# Patient Record
Sex: MALE | Race: WHITE | NOT HISPANIC OR LATINO | Employment: FULL TIME | URBAN - METROPOLITAN AREA
[De-identification: names, ages, dates, MRNs, and addresses within clinical notes are randomized per-mention and may not be internally consistent; named-entity substitution may affect disease eponyms.]

---

## 2018-03-14 ENCOUNTER — OFFICE VISIT CONVERTED (OUTPATIENT)
Dept: CARDIOLOGY | Facility: CLINIC | Age: 78
End: 2018-03-14
Attending: INTERNAL MEDICINE

## 2018-09-24 ENCOUNTER — OFFICE VISIT CONVERTED (OUTPATIENT)
Dept: CARDIOLOGY | Facility: CLINIC | Age: 78
End: 2018-09-24
Attending: NURSE PRACTITIONER

## 2018-09-24 ENCOUNTER — OFFICE VISIT CONVERTED (OUTPATIENT)
Dept: UROLOGY | Facility: CLINIC | Age: 78
End: 2018-09-24
Attending: UROLOGY

## 2019-03-27 ENCOUNTER — HOSPITAL ENCOUNTER (OUTPATIENT)
Dept: OTHER | Facility: HOSPITAL | Age: 79
Discharge: HOME | End: 2019-03-27
Attending: UROLOGY

## 2019-03-27 LAB — PSA SERPL-MCNC: 0.26 NG/ML (ref 0–4)

## 2019-04-01 ENCOUNTER — OFFICE VISIT CONVERTED (OUTPATIENT)
Dept: CARDIOLOGY | Facility: CLINIC | Age: 79
End: 2019-04-01
Attending: INTERNAL MEDICINE

## 2019-04-22 ENCOUNTER — HOSPITAL ENCOUNTER (OUTPATIENT)
Dept: GASTROENTEROLOGY | Facility: HOSPITAL | Age: 79
Setting detail: HOSPITAL OUTPATIENT SURGERY
Discharge: HOME | End: 2019-04-22
Attending: INTERNAL MEDICINE

## 2019-09-16 ENCOUNTER — HOSPITAL ENCOUNTER (OUTPATIENT)
Dept: LAB | Facility: HOSPITAL | Age: 79
Discharge: HOME | End: 2019-09-16
Attending: PHYSICIAN ASSISTANT

## 2019-09-16 LAB
ANION GAP SERPL CALC-SCNC: 18 MMOL/L (ref 8–19)
BUN SERPL-MCNC: 24 MG/DL (ref 5–25)
BUN/CREAT SERPL: 14 {RATIO} (ref 6–20)
CALCIUM SERPL-MCNC: 9.3 MG/DL (ref 8.7–10.4)
CHLORIDE SERPL-SCNC: 97 MMOL/L (ref 99–111)
CONV CO2: 26 MMOL/L (ref 22–32)
CREAT UR-MCNC: 1.73 MG/DL (ref 0.7–1.2)
GFR SERPLBLD BASED ON 1.73 SQ M-ARVRAT: 37 ML/MIN/{1.73_M2}
GLUCOSE SERPL-MCNC: 122 MG/DL (ref 70–99)
OSMOLALITY SERPL CALC.SUM OF ELEC: 289 MOSM/KG (ref 273–304)
POTASSIUM SERPL-SCNC: 4.3 MMOL/L (ref 3.5–5.3)
SODIUM SERPL-SCNC: 137 MMOL/L (ref 135–147)

## 2019-09-27 ENCOUNTER — OFFICE VISIT CONVERTED (OUTPATIENT)
Dept: UROLOGY | Facility: CLINIC | Age: 79
End: 2019-09-27
Attending: UROLOGY

## 2019-09-30 ENCOUNTER — HOSPITAL ENCOUNTER (OUTPATIENT)
Dept: LAB | Facility: HOSPITAL | Age: 79
Discharge: HOME | End: 2019-09-30
Attending: PHYSICIAN ASSISTANT

## 2019-09-30 LAB
ANION GAP SERPL CALC-SCNC: 21 MMOL/L (ref 8–19)
BUN SERPL-MCNC: 19 MG/DL (ref 5–25)
BUN/CREAT SERPL: 12 {RATIO} (ref 6–20)
CALCIUM SERPL-MCNC: 9.3 MG/DL (ref 8.7–10.4)
CHLORIDE SERPL-SCNC: 106 MMOL/L (ref 99–111)
CONV CO2: 21 MMOL/L (ref 22–32)
CREAT UR-MCNC: 1.56 MG/DL (ref 0.7–1.2)
GFR SERPLBLD BASED ON 1.73 SQ M-ARVRAT: 41 ML/MIN/{1.73_M2}
GLUCOSE SERPL-MCNC: 115 MG/DL (ref 70–99)
OSMOLALITY SERPL CALC.SUM OF ELEC: 299 MOSM/KG (ref 273–304)
POTASSIUM SERPL-SCNC: 4.7 MMOL/L (ref 3.5–5.3)
SODIUM SERPL-SCNC: 143 MMOL/L (ref 135–147)

## 2019-10-03 ENCOUNTER — HOSPITAL ENCOUNTER (OUTPATIENT)
Dept: LAB | Facility: HOSPITAL | Age: 79
Discharge: HOME | End: 2019-10-03
Attending: INTERNAL MEDICINE

## 2019-10-03 LAB
ALBUMIN SERPL-MCNC: 4.1 G/DL (ref 3.5–5)
ALBUMIN/GLOB SERPL: 1.8 {RATIO} (ref 1.4–2.6)
ALP SERPL-CCNC: 75 U/L (ref 56–155)
ALT SERPL-CCNC: 27 U/L (ref 10–40)
ANION GAP SERPL CALC-SCNC: 15 MMOL/L (ref 8–19)
AST SERPL-CCNC: 21 U/L (ref 15–50)
BASOPHILS # BLD AUTO: 0.04 10*3/UL (ref 0–0.2)
BASOPHILS NFR BLD AUTO: 1 % (ref 0–3)
BILIRUB SERPL-MCNC: 0.26 MG/DL (ref 0.2–1.3)
BUN SERPL-MCNC: 18 MG/DL (ref 5–25)
BUN/CREAT SERPL: 11 {RATIO} (ref 6–20)
CALCIUM SERPL-MCNC: 9.2 MG/DL (ref 8.7–10.4)
CHLORIDE SERPL-SCNC: 105 MMOL/L (ref 99–111)
CHOLEST SERPL-MCNC: 144 MG/DL (ref 107–200)
CHOLEST/HDLC SERPL: 4.5 {RATIO} (ref 3–6)
CONV ABS IMM GRAN: 0.01 10*3/UL (ref 0–0.2)
CONV CO2: 25 MMOL/L (ref 22–32)
CONV IMMATURE GRAN: 0.3 % (ref 0–1.8)
CONV TOTAL PROTEIN: 6.4 G/DL (ref 6.3–8.2)
CREAT UR-MCNC: 1.62 MG/DL (ref 0.7–1.2)
DEPRECATED RDW RBC AUTO: 48.3 FL (ref 35.1–43.9)
EOSINOPHIL # BLD AUTO: 0.25 10*3/UL (ref 0–0.7)
EOSINOPHIL # BLD AUTO: 6.4 % (ref 0–7)
ERYTHROCYTE [DISTWIDTH] IN BLOOD BY AUTOMATED COUNT: 13.7 % (ref 11.6–14.4)
GFR SERPLBLD BASED ON 1.73 SQ M-ARVRAT: 40 ML/MIN/{1.73_M2}
GLOBULIN UR ELPH-MCNC: 2.3 G/DL (ref 2–3.5)
GLUCOSE SERPL-MCNC: 96 MG/DL (ref 70–99)
HCT VFR BLD AUTO: 36.4 % (ref 42–52)
HDLC SERPL-MCNC: 32 MG/DL (ref 40–60)
HGB BLD-MCNC: 11.8 G/DL (ref 14–18)
LDLC SERPL CALC-MCNC: 59 MG/DL (ref 70–100)
LYMPHOCYTES # BLD AUTO: 1.28 10*3/UL (ref 1–5)
LYMPHOCYTES NFR BLD AUTO: 32.7 % (ref 20–45)
MCH RBC QN AUTO: 31.1 PG (ref 27–31)
MCHC RBC AUTO-ENTMCNC: 32.4 G/DL (ref 33–37)
MCV RBC AUTO: 95.8 FL (ref 80–96)
MONOCYTES # BLD AUTO: 0.41 10*3/UL (ref 0.2–1.2)
MONOCYTES NFR BLD AUTO: 10.5 % (ref 3–10)
NEUTROPHILS # BLD AUTO: 1.93 10*3/UL (ref 2–8)
NEUTROPHILS NFR BLD AUTO: 49.1 % (ref 30–85)
NRBC CBCN: 0 % (ref 0–0.7)
OSMOLALITY SERPL CALC.SUM OF ELEC: 294 MOSM/KG (ref 273–304)
PLATELET # BLD AUTO: 131 10*3/UL (ref 130–400)
PMV BLD AUTO: 10.6 FL (ref 9.4–12.4)
POTASSIUM SERPL-SCNC: 4.2 MMOL/L (ref 3.5–5.3)
RBC # BLD AUTO: 3.8 10*6/UL (ref 4.7–6.1)
SODIUM SERPL-SCNC: 141 MMOL/L (ref 135–147)
T4 FREE SERPL-MCNC: 1.2 NG/DL (ref 0.9–1.8)
TRIGL SERPL-MCNC: 264 MG/DL (ref 40–150)
TSH SERPL-ACNC: 3.67 M[IU]/L (ref 0.27–4.2)
VLDLC SERPL-MCNC: 53 MG/DL (ref 5–37)
WBC # BLD AUTO: 3.92 10*3/UL (ref 4.8–10.8)

## 2019-10-07 ENCOUNTER — OFFICE VISIT CONVERTED (OUTPATIENT)
Dept: CARDIOLOGY | Facility: CLINIC | Age: 79
End: 2019-10-07
Attending: INTERNAL MEDICINE

## 2019-10-16 ENCOUNTER — HOSPITAL ENCOUNTER (OUTPATIENT)
Dept: LAB | Facility: HOSPITAL | Age: 79
Discharge: HOME | End: 2019-10-16
Attending: PHYSICIAN ASSISTANT

## 2019-10-16 LAB
ANION GAP SERPL CALC-SCNC: 18 MMOL/L (ref 8–19)
BUN SERPL-MCNC: 27 MG/DL (ref 5–25)
BUN/CREAT SERPL: 15 {RATIO} (ref 6–20)
CALCIUM SERPL-MCNC: 9.4 MG/DL (ref 8.7–10.4)
CHLORIDE SERPL-SCNC: 96 MMOL/L (ref 99–111)
CONV CO2: 27 MMOL/L (ref 22–32)
CREAT UR-MCNC: 1.77 MG/DL (ref 0.7–1.2)
GFR SERPLBLD BASED ON 1.73 SQ M-ARVRAT: 36 ML/MIN/{1.73_M2}
GLUCOSE SERPL-MCNC: 106 MG/DL (ref 70–99)
OSMOLALITY SERPL CALC.SUM OF ELEC: 290 MOSM/KG (ref 273–304)
POTASSIUM SERPL-SCNC: 4.3 MMOL/L (ref 3.5–5.3)
SODIUM SERPL-SCNC: 137 MMOL/L (ref 135–147)

## 2019-10-21 ENCOUNTER — HOSPITAL ENCOUNTER (OUTPATIENT)
Dept: LAB | Facility: HOSPITAL | Age: 79
Discharge: HOME | End: 2019-10-21
Attending: INTERNAL MEDICINE

## 2019-10-21 LAB
BASOPHILS # BLD AUTO: 0.03 10*3/UL (ref 0–0.2)
BASOPHILS NFR BLD AUTO: 0.6 % (ref 0–3)
CONV ABS IMM GRAN: 0.02 10*3/UL (ref 0–0.2)
CONV IMMATURE GRAN: 0.4 % (ref 0–1.8)
DEPRECATED RDW RBC AUTO: 45.3 FL (ref 35.1–43.9)
EOSINOPHIL # BLD AUTO: 0.25 10*3/UL (ref 0–0.7)
EOSINOPHIL # BLD AUTO: 5.4 % (ref 0–7)
ERYTHROCYTE [DISTWIDTH] IN BLOOD BY AUTOMATED COUNT: 13.2 % (ref 11.6–14.4)
HCT VFR BLD AUTO: 38.4 % (ref 42–52)
HGB BLD-MCNC: 12.7 G/DL (ref 14–18)
LYMPHOCYTES # BLD AUTO: 1.49 10*3/UL (ref 1–5)
LYMPHOCYTES NFR BLD AUTO: 32.2 % (ref 20–45)
MCH RBC QN AUTO: 31.1 PG (ref 27–31)
MCHC RBC AUTO-ENTMCNC: 33.1 G/DL (ref 33–37)
MCV RBC AUTO: 93.9 FL (ref 80–96)
MONOCYTES # BLD AUTO: 0.44 10*3/UL (ref 0.2–1.2)
MONOCYTES NFR BLD AUTO: 9.5 % (ref 3–10)
NEUTROPHILS # BLD AUTO: 2.4 10*3/UL (ref 2–8)
NEUTROPHILS NFR BLD AUTO: 51.9 % (ref 30–85)
NRBC CBCN: 0 % (ref 0–0.7)
PLATELET # BLD AUTO: 128 10*3/UL (ref 130–400)
PMV BLD AUTO: 10.4 FL (ref 9.4–12.4)
RBC # BLD AUTO: 4.09 10*6/UL (ref 4.7–6.1)
WBC # BLD AUTO: 4.63 10*3/UL (ref 4.8–10.8)

## 2019-11-21 ENCOUNTER — HOSPITAL ENCOUNTER (OUTPATIENT)
Dept: LAB | Facility: HOSPITAL | Age: 79
Discharge: HOME | End: 2019-11-21
Attending: INTERNAL MEDICINE

## 2019-11-21 LAB
ANION GAP SERPL CALC-SCNC: 17 MMOL/L (ref 8–19)
BASOPHILS # BLD AUTO: 0.03 10*3/UL (ref 0–0.2)
BASOPHILS NFR BLD AUTO: 0.6 % (ref 0–3)
BUN SERPL-MCNC: 30 MG/DL (ref 5–25)
BUN/CREAT SERPL: 17 {RATIO} (ref 6–20)
CALCIUM SERPL-MCNC: 9.6 MG/DL (ref 8.7–10.4)
CHLORIDE SERPL-SCNC: 100 MMOL/L (ref 99–111)
CONV ABS IMM GRAN: 0.01 10*3/UL (ref 0–0.2)
CONV CO2: 28 MMOL/L (ref 22–32)
CONV IMMATURE GRAN: 0.2 % (ref 0–1.8)
CREAT UR-MCNC: 1.77 MG/DL (ref 0.7–1.2)
DEPRECATED RDW RBC AUTO: 45.8 FL (ref 35.1–43.9)
EOSINOPHIL # BLD AUTO: 0.24 10*3/UL (ref 0–0.7)
EOSINOPHIL # BLD AUTO: 4.7 % (ref 0–7)
ERYTHROCYTE [DISTWIDTH] IN BLOOD BY AUTOMATED COUNT: 13.4 % (ref 11.6–14.4)
GFR SERPLBLD BASED ON 1.73 SQ M-ARVRAT: 36 ML/MIN/{1.73_M2}
GLUCOSE SERPL-MCNC: 106 MG/DL (ref 70–99)
HCT VFR BLD AUTO: 39.9 % (ref 42–52)
HGB BLD-MCNC: 13.2 G/DL (ref 14–18)
LYMPHOCYTES # BLD AUTO: 1.38 10*3/UL (ref 1–5)
LYMPHOCYTES NFR BLD AUTO: 27 % (ref 20–45)
MCH RBC QN AUTO: 31.1 PG (ref 27–31)
MCHC RBC AUTO-ENTMCNC: 33.1 G/DL (ref 33–37)
MCV RBC AUTO: 93.9 FL (ref 80–96)
MONOCYTES # BLD AUTO: 0.5 10*3/UL (ref 0.2–1.2)
MONOCYTES NFR BLD AUTO: 9.8 % (ref 3–10)
NEUTROPHILS # BLD AUTO: 2.95 10*3/UL (ref 2–8)
NEUTROPHILS NFR BLD AUTO: 57.7 % (ref 30–85)
NRBC CBCN: 0 % (ref 0–0.7)
OSMOLALITY SERPL CALC.SUM OF ELEC: 297 MOSM/KG (ref 273–304)
PLATELET # BLD AUTO: 132 10*3/UL (ref 130–400)
PMV BLD AUTO: 11 FL (ref 9.4–12.4)
POTASSIUM SERPL-SCNC: 4.9 MMOL/L (ref 3.5–5.3)
RBC # BLD AUTO: 4.25 10*6/UL (ref 4.7–6.1)
SODIUM SERPL-SCNC: 140 MMOL/L (ref 135–147)
WBC # BLD AUTO: 5.11 10*3/UL (ref 4.8–10.8)

## 2020-01-03 ENCOUNTER — HOSPITAL ENCOUNTER (OUTPATIENT)
Dept: LAB | Facility: HOSPITAL | Age: 80
Discharge: HOME | End: 2020-01-03
Attending: NURSE PRACTITIONER

## 2020-01-03 LAB
ALBUMIN SERPL-MCNC: 4.3 G/DL (ref 3.5–5)
ALBUMIN/GLOB SERPL: 1.9 {RATIO} (ref 1.4–2.6)
ALP SERPL-CCNC: 75 U/L (ref 56–155)
ALT SERPL-CCNC: 23 U/L (ref 10–40)
ANION GAP SERPL CALC-SCNC: 17 MMOL/L (ref 8–19)
AST SERPL-CCNC: 19 U/L (ref 15–50)
BASOPHILS # BLD AUTO: 0.05 10*3/UL (ref 0–0.2)
BASOPHILS NFR BLD AUTO: 1 % (ref 0–3)
BILIRUB SERPL-MCNC: 0.39 MG/DL (ref 0.2–1.3)
BUN SERPL-MCNC: 36 MG/DL (ref 5–25)
BUN/CREAT SERPL: 20 {RATIO} (ref 6–20)
CALCIUM SERPL-MCNC: 9.5 MG/DL (ref 8.7–10.4)
CHLORIDE SERPL-SCNC: 100 MMOL/L (ref 99–111)
CHOLEST SERPL-MCNC: 183 MG/DL (ref 107–200)
CHOLEST/HDLC SERPL: 5.5 {RATIO} (ref 3–6)
CONV ABS IMM GRAN: 0.02 10*3/UL (ref 0–0.2)
CONV CO2: 25 MMOL/L (ref 22–32)
CONV IMMATURE GRAN: 0.4 % (ref 0–1.8)
CONV TOTAL PROTEIN: 6.6 G/DL (ref 6.3–8.2)
CREAT UR-MCNC: 1.81 MG/DL (ref 0.7–1.2)
DEPRECATED RDW RBC AUTO: 46.2 FL (ref 35.1–43.9)
EOSINOPHIL # BLD AUTO: 0.3 10*3/UL (ref 0–0.7)
EOSINOPHIL # BLD AUTO: 5.9 % (ref 0–7)
ERYTHROCYTE [DISTWIDTH] IN BLOOD BY AUTOMATED COUNT: 13.6 % (ref 11.6–14.4)
GFR SERPLBLD BASED ON 1.73 SQ M-ARVRAT: 35 ML/MIN/{1.73_M2}
GLOBULIN UR ELPH-MCNC: 2.3 G/DL (ref 2–3.5)
GLUCOSE SERPL-MCNC: 119 MG/DL (ref 70–99)
HCT VFR BLD AUTO: 41.9 % (ref 42–52)
HDLC SERPL-MCNC: 33 MG/DL (ref 40–60)
HGB BLD-MCNC: 13.7 G/DL (ref 14–18)
LDLC SERPL CALC-MCNC: 94 MG/DL (ref 70–100)
LYMPHOCYTES # BLD AUTO: 1.54 10*3/UL (ref 1–5)
LYMPHOCYTES NFR BLD AUTO: 30.1 % (ref 20–45)
MCH RBC QN AUTO: 30.5 PG (ref 27–31)
MCHC RBC AUTO-ENTMCNC: 32.7 G/DL (ref 33–37)
MCV RBC AUTO: 93.3 FL (ref 80–96)
MONOCYTES # BLD AUTO: 0.42 10*3/UL (ref 0.2–1.2)
MONOCYTES NFR BLD AUTO: 8.2 % (ref 3–10)
NEUTROPHILS # BLD AUTO: 2.79 10*3/UL (ref 2–8)
NEUTROPHILS NFR BLD AUTO: 54.4 % (ref 30–85)
NRBC CBCN: 0 % (ref 0–0.7)
OSMOLALITY SERPL CALC.SUM OF ELEC: 293 MOSM/KG (ref 273–304)
PLATELET # BLD AUTO: 123 10*3/UL (ref 130–400)
PMV BLD AUTO: 11.4 FL (ref 9.4–12.4)
POTASSIUM SERPL-SCNC: 4.7 MMOL/L (ref 3.5–5.3)
RBC # BLD AUTO: 4.49 10*6/UL (ref 4.7–6.1)
SODIUM SERPL-SCNC: 137 MMOL/L (ref 135–147)
TRIGL SERPL-MCNC: 281 MG/DL (ref 40–150)
VLDLC SERPL-MCNC: 56 MG/DL (ref 5–37)
WBC # BLD AUTO: 5.12 10*3/UL (ref 4.8–10.8)

## 2020-01-06 ENCOUNTER — OFFICE VISIT CONVERTED (OUTPATIENT)
Dept: GASTROENTEROLOGY | Facility: CLINIC | Age: 80
End: 2020-01-06
Attending: PHYSICIAN ASSISTANT

## 2020-01-06 ENCOUNTER — CONVERSION ENCOUNTER (OUTPATIENT)
Dept: GASTROENTEROLOGY | Facility: CLINIC | Age: 80
End: 2020-01-06

## 2020-01-13 ENCOUNTER — OFFICE VISIT CONVERTED (OUTPATIENT)
Dept: CARDIOLOGY | Facility: CLINIC | Age: 80
End: 2020-01-13
Attending: INTERNAL MEDICINE

## 2020-04-06 ENCOUNTER — TELEMEDICINE CONVERTED (OUTPATIENT)
Dept: GASTROENTEROLOGY | Facility: CLINIC | Age: 80
End: 2020-04-06
Attending: PHYSICIAN ASSISTANT

## 2020-05-04 ENCOUNTER — HOSPITAL ENCOUNTER (OUTPATIENT)
Dept: LAB | Facility: HOSPITAL | Age: 80
Discharge: HOME | End: 2020-05-04
Attending: NURSE PRACTITIONER

## 2020-05-04 LAB
ALBUMIN SERPL-MCNC: 4.1 G/DL (ref 3.5–5)
ALBUMIN/GLOB SERPL: 1.6 {RATIO} (ref 1.4–2.6)
ALP SERPL-CCNC: 70 U/L (ref 56–155)
ALT SERPL-CCNC: 25 U/L (ref 10–40)
ANION GAP SERPL CALC-SCNC: 17 MMOL/L (ref 8–19)
AST SERPL-CCNC: 19 U/L (ref 15–50)
BILIRUB SERPL-MCNC: 0.4 MG/DL (ref 0.2–1.3)
BUN SERPL-MCNC: 21 MG/DL (ref 5–25)
BUN/CREAT SERPL: 12 {RATIO} (ref 6–20)
CALCIUM SERPL-MCNC: 9.3 MG/DL (ref 8.7–10.4)
CHLORIDE SERPL-SCNC: 101 MMOL/L (ref 99–111)
CHOLEST SERPL-MCNC: 115 MG/DL (ref 107–200)
CHOLEST/HDLC SERPL: 3.7 {RATIO} (ref 3–6)
CONV CO2: 26 MMOL/L (ref 22–32)
CONV TOTAL PROTEIN: 6.6 G/DL (ref 6.3–8.2)
CREAT UR-MCNC: 1.79 MG/DL (ref 0.7–1.2)
GFR SERPLBLD BASED ON 1.73 SQ M-ARVRAT: 35 ML/MIN/{1.73_M2}
GLOBULIN UR ELPH-MCNC: 2.5 G/DL (ref 2–3.5)
GLUCOSE SERPL-MCNC: 114 MG/DL (ref 70–99)
HDLC SERPL-MCNC: 31 MG/DL (ref 40–60)
LDLC SERPL CALC-MCNC: 57 MG/DL (ref 70–100)
OSMOLALITY SERPL CALC.SUM OF ELEC: 292 MOSM/KG (ref 273–304)
POTASSIUM SERPL-SCNC: 5.1 MMOL/L (ref 3.5–5.3)
SODIUM SERPL-SCNC: 139 MMOL/L (ref 135–147)
TRIGL SERPL-MCNC: 134 MG/DL (ref 40–150)
VLDLC SERPL-MCNC: 27 MG/DL (ref 5–37)

## 2020-07-13 ENCOUNTER — OFFICE VISIT CONVERTED (OUTPATIENT)
Dept: CARDIOLOGY | Facility: CLINIC | Age: 80
End: 2020-07-13
Attending: INTERNAL MEDICINE

## 2020-08-31 ENCOUNTER — HOSPITAL ENCOUNTER (OUTPATIENT)
Dept: LAB | Facility: HOSPITAL | Age: 80
Discharge: HOME | End: 2020-08-31
Attending: NURSE PRACTITIONER

## 2020-08-31 LAB
ANION GAP SERPL CALC-SCNC: 20 MMOL/L (ref 8–19)
BUN SERPL-MCNC: 25 MG/DL (ref 5–25)
BUN/CREAT SERPL: 14 {RATIO} (ref 6–20)
CALCIUM SERPL-MCNC: 9.8 MG/DL (ref 8.7–10.4)
CHLORIDE SERPL-SCNC: 102 MMOL/L (ref 99–111)
CONV CO2: 24 MMOL/L (ref 22–32)
CREAT UR-MCNC: 1.74 MG/DL (ref 0.7–1.2)
GFR SERPLBLD BASED ON 1.73 SQ M-ARVRAT: 36 ML/MIN/{1.73_M2}
GLUCOSE SERPL-MCNC: 111 MG/DL (ref 70–99)
OSMOLALITY SERPL CALC.SUM OF ELEC: 297 MOSM/KG (ref 273–304)
POTASSIUM SERPL-SCNC: 4.9 MMOL/L (ref 3.5–5.3)
SODIUM SERPL-SCNC: 141 MMOL/L (ref 135–147)

## 2020-10-15 ENCOUNTER — HOSPITAL ENCOUNTER (OUTPATIENT)
Dept: LAB | Facility: HOSPITAL | Age: 80
Discharge: HOME | End: 2020-10-15
Attending: UROLOGY

## 2020-10-15 LAB — PSA SERPL-MCNC: 0.19 NG/ML (ref 0–4)

## 2020-10-23 ENCOUNTER — OFFICE VISIT CONVERTED (OUTPATIENT)
Dept: UROLOGY | Facility: CLINIC | Age: 80
End: 2020-10-23
Attending: UROLOGY

## 2021-01-25 ENCOUNTER — HOSPITAL ENCOUNTER (OUTPATIENT)
Dept: LAB | Facility: HOSPITAL | Age: 81
Discharge: HOME | End: 2021-01-25
Attending: NURSE PRACTITIONER

## 2021-01-25 LAB
25(OH)D3 SERPL-MCNC: 44.2 NG/ML (ref 30–100)
ALBUMIN SERPL-MCNC: 3.8 G/DL (ref 3.5–5)
ALBUMIN SERPL-MCNC: 3.9 G/DL (ref 3.5–5)
ALBUMIN/GLOB SERPL: 1.5 {RATIO} (ref 1.4–2.6)
ALP SERPL-CCNC: 91 U/L (ref 56–155)
ALT SERPL-CCNC: 16 U/L (ref 10–40)
ANION GAP SERPL CALC-SCNC: 12 MMOL/L (ref 8–19)
ANION GAP SERPL CALC-SCNC: 14 MMOL/L (ref 8–19)
APPEARANCE UR: CLEAR
AST SERPL-CCNC: 18 U/L (ref 15–50)
BASOPHILS # BLD AUTO: 0.03 10*3/UL (ref 0–0.2)
BASOPHILS NFR BLD AUTO: 0.7 % (ref 0–3)
BILIRUB SERPL-MCNC: 0.35 MG/DL (ref 0.2–1.3)
BILIRUB UR QL: NEGATIVE
BUN SERPL-MCNC: 24 MG/DL (ref 5–25)
BUN SERPL-MCNC: 25 MG/DL (ref 5–25)
BUN/CREAT SERPL: 17 {RATIO} (ref 6–20)
BUN/CREAT SERPL: 18 {RATIO} (ref 6–20)
CALCIUM SERPL-MCNC: 9 MG/DL (ref 8.7–10.4)
CALCIUM SERPL-MCNC: 9.3 MG/DL (ref 8.7–10.4)
CHLORIDE SERPL-SCNC: 100 MMOL/L (ref 99–111)
CHLORIDE SERPL-SCNC: 101 MMOL/L (ref 99–111)
CHOLEST SERPL-MCNC: 144 MG/DL (ref 107–200)
CHOLEST/HDLC SERPL: 3.7 {RATIO} (ref 3–6)
COLOR UR: YELLOW
CONV ABS IMM GRAN: 0.02 10*3/UL (ref 0–0.2)
CONV CO2: 27 MMOL/L (ref 22–32)
CONV CO2: 28 MMOL/L (ref 22–32)
CONV COLLECTION SOURCE (UA): NORMAL
CONV CREATININE URINE, RANDOM: 116.8 MG/DL (ref 10–300)
CONV IMMATURE GRAN: 0.4 % (ref 0–1.8)
CONV PROTEIN TO CREATININE RATIO (RANDOM URINE): 0.17 {RATIO} (ref 0–0.1)
CONV TOTAL PROTEIN: 6.4 G/DL (ref 6.3–8.2)
CONV UROBILINOGEN IN URINE BY AUTOMATED TEST STRIP: 1 {EHRLICHU}/DL (ref 0.1–1)
CREAT UR-MCNC: 1.38 MG/DL (ref 0.7–1.2)
CREAT UR-MCNC: 1.39 MG/DL (ref 0.7–1.2)
DEPRECATED RDW RBC AUTO: 48.6 FL (ref 35.1–43.9)
EOSINOPHIL # BLD AUTO: 0.52 10*3/UL (ref 0–0.7)
EOSINOPHIL # BLD AUTO: 11.5 % (ref 0–7)
ERYTHROCYTE [DISTWIDTH] IN BLOOD BY AUTOMATED COUNT: 14.1 % (ref 11.6–14.4)
EST. AVERAGE GLUCOSE BLD GHB EST-MCNC: 105 MG/DL
GFR SERPLBLD BASED ON 1.73 SQ M-ARVRAT: 47 ML/MIN/{1.73_M2}
GFR SERPLBLD BASED ON 1.73 SQ M-ARVRAT: 48 ML/MIN/{1.73_M2}
GLOBULIN UR ELPH-MCNC: 2.6 G/DL (ref 2–3.5)
GLUCOSE SERPL-MCNC: 119 MG/DL (ref 70–99)
GLUCOSE SERPL-MCNC: 120 MG/DL (ref 70–99)
GLUCOSE UR QL: NEGATIVE MG/DL
HBA1C MFR BLD: 5.3 % (ref 3.5–5.7)
HCT VFR BLD AUTO: 38.4 % (ref 42–52)
HDLC SERPL-MCNC: 39 MG/DL (ref 40–60)
HGB BLD-MCNC: 12.6 G/DL (ref 14–18)
HGB UR QL STRIP: NEGATIVE
KETONES UR QL STRIP: NEGATIVE MG/DL
LDLC SERPL CALC-MCNC: 60 MG/DL (ref 70–100)
LEUKOCYTE ESTERASE UR QL STRIP: NEGATIVE
LYMPHOCYTES # BLD AUTO: 1.49 10*3/UL (ref 1–5)
LYMPHOCYTES NFR BLD AUTO: 33 % (ref 20–45)
MCH RBC QN AUTO: 30.9 PG (ref 27–31)
MCHC RBC AUTO-ENTMCNC: 32.8 G/DL (ref 33–37)
MCV RBC AUTO: 94.1 FL (ref 80–96)
MONOCYTES # BLD AUTO: 0.46 10*3/UL (ref 0.2–1.2)
MONOCYTES NFR BLD AUTO: 10.2 % (ref 3–10)
NEUTROPHILS # BLD AUTO: 2 10*3/UL (ref 2–8)
NEUTROPHILS NFR BLD AUTO: 44.2 % (ref 30–85)
NITRITE UR QL STRIP: NEGATIVE
NRBC CBCN: 0 % (ref 0–0.7)
OSMOLALITY SERPL CALC.SUM OF ELEC: 290 MOSM/KG (ref 273–304)
PH UR STRIP.AUTO: 7 [PH] (ref 5–8)
PHOSPHATE SERPL-MCNC: 3 MG/DL (ref 2.4–4.5)
PLATELET # BLD AUTO: 132 10*3/UL (ref 130–400)
PMV BLD AUTO: 11 FL (ref 9.4–12.4)
POTASSIUM SERPL-SCNC: 4.3 MMOL/L (ref 3.5–5.3)
POTASSIUM SERPL-SCNC: 4.3 MMOL/L (ref 3.5–5.3)
PROT UR QL: NORMAL MG/DL
PROT UR-MCNC: 20.3 MG/DL
PTH-INTACT SERPL-MCNC: 56 PG/ML (ref 11.1–79.5)
RBC # BLD AUTO: 4.08 10*6/UL (ref 4.7–6.1)
SODIUM SERPL-SCNC: 137 MMOL/L (ref 135–147)
SODIUM SERPL-SCNC: 137 MMOL/L (ref 135–147)
SP GR UR: 1.02 (ref 1–1.03)
T4 FREE SERPL-MCNC: 1.2 NG/DL (ref 0.9–1.8)
TRIGL SERPL-MCNC: 225 MG/DL (ref 40–150)
TSH SERPL-ACNC: 2.98 M[IU]/L (ref 0.27–4.2)
VLDLC SERPL-MCNC: 45 MG/DL (ref 5–37)
WBC # BLD AUTO: 4.52 10*3/UL (ref 4.8–10.8)

## 2021-01-26 ENCOUNTER — HOSPITAL ENCOUNTER (OUTPATIENT)
Dept: OTHER | Facility: HOSPITAL | Age: 81
Discharge: HOME | End: 2021-01-26
Attending: INTERNAL MEDICINE

## 2021-02-01 ENCOUNTER — CONVERSION ENCOUNTER (OUTPATIENT)
Dept: CARDIOLOGY | Facility: CLINIC | Age: 81
End: 2021-02-01

## 2021-02-01 ENCOUNTER — OFFICE VISIT CONVERTED (OUTPATIENT)
Dept: CARDIOLOGY | Facility: CLINIC | Age: 81
End: 2021-02-01
Attending: NURSE PRACTITIONER

## 2021-02-18 ENCOUNTER — HOSPITAL ENCOUNTER (OUTPATIENT)
Dept: VACCINE CLINIC | Facility: HOSPITAL | Age: 81
Discharge: HOME | End: 2021-02-18
Attending: INTERNAL MEDICINE

## 2021-03-01 ENCOUNTER — HOSPITAL ENCOUNTER (OUTPATIENT)
Dept: LAB | Facility: HOSPITAL | Age: 81
Discharge: HOME | End: 2021-03-01
Attending: PHYSICIAN ASSISTANT

## 2021-05-12 NOTE — PROGRESS NOTES
Quick Note      Patient Name: Michael Sol   Patient ID: [id number]   Sex: Male   YOB: 1940    Primary Care Provider: Joana Dietz PA-C   Referring Provider: Joana Dietz PA-C    Visit Date: April 6, 2020    Provider: Pedrito Aguillon PA-C   Location: [facility]   Location Address: [address] [place] [address]  [id number]   Location Phone: [phone]          History Of Present Illness  TELEHEALTH VISIT  Chief Complaint: 3 month follow up IBS and ARTEMIO   Michael Sol is a 79 year old /White male who is presenting for evaluation via telehealth visit. Verbal consent obtained before beginning visit.   Provider spent 12 minutes with the patient during telehealth visit.   The following staff were present during this visit: Ever Worthington MA   Past Medical History/Overview of Patient Symptoms     79-year-old male with history of colon polyps, prostate cancer, and diverticulitis returns for follow-up.  He did have a bout of diverticulitis September 2019 successfully treated with Cipro and Flagyl.  He started developing lower abdominal cramping that occurred intermittently shortly after diverticulitis.  He was given a trial of Bentyl 20 mg to take as needed.  He reports he takes it as needed approximately once every 2 to 3 weeks with 100% improvement of symptoms.  Overall, the patient does not have any new complaints today. His most recent lab work showed hemoglobin 13.7 and MCV 93.3.  This is an improvement from his previous hemoglobin of 12.3.    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           Assessment  · Anemia     285.9/D64.9  · IBS (irritable bowel syndrome)     564.1/K58.9  · Personal history of colonic polyps     V12.72/Z86.010      Plan  · Orders  o CBC with Auto Diff [facility] (20240) - 285.9/D64.9, 564.1/K58.9, V12.72/Z86.010 - 04/06/2020  o Physician Telephone Evaluation, 11-20 minutes (50427) - 285.9/D64.9, 564.1/K58.9, V12.72/Z86.010 -  06/06/2020  · Medications  o Medications have been Reconciled  o Transition of Care or Provider Policy  · Instructions  o Plan Of Care:   o Chronic conditions reviewed and taken into consideration for today's treatment plan.  o Patient instructed to seek medical attention urgently for new or worsening symptoms.  o Patient was educated/instructed on their diagnosis, treatment and medications prior to discharge from the clinic today.  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 I had a thorough discussion with him regarding his anemia and that is improving. We discussed options to include EGD versus continue monitoring lab work. I will have him have a CBC in approximately 2 months and he will call for results. If his lab work is returned to normal he will follow-up as needed.            Electronically Signed by: NESSA Villafana-BRIDGET -Author on April 6, 2020 08:56:54 AM  Electronically Co-signed by: Derrick Garcia MD -Reviewer on April 6, 2020 11:07:05 AM

## 2021-05-13 NOTE — PROGRESS NOTES
Progress Note      Patient Name: Michael Sol   Patient ID: [id number]   Sex: Male   YOB: 1940    Primary Care Provider: Joana Dietz PA-C   Referring Provider: Joana Dietz PA-C    Visit Date: October 23, 2020    Provider: Juan Antonio Mark MD   Location: [facility]   Location Address: [address] [place] [address]  [id number]   Location Phone: [phone]          Chief Complaint  · pt here for urologic issues      History Of Present Illness     80-year-old  gentleman here today with a history of prostate cancer status post brachytherapy in 11/13.  Also with incontinence and nocturia.  Patient does have a red area on the glans penis that has been there for a long time.    Has noticed some slower stream in the a.m. over the last 3 to 4 months.  It is better during rest of the day.  No straining. Not bothersome.     No burning/pain dysuria or gross hematuria    Pt with incontience.  4-5 pads daily. Worse over the last year.  Nocturia X  3-4.  No urgency. Some urgency at night.    Area on the glans is not changing.  Is caused some itching at times.  Not painful.      Myrbetriq 50 mgdid not make any difference.    PVR     9/19  64    Previous    No history of kidney stone.    Status post coronary stent 2007.  Patient does not smoke.  81 aspirin daily    History of gross hematuria    6/15 cystoscopynegative  6/15 CT urogrampunctate nonobstructive tract was on the left.  Negative otherwise.     Prostate CA    10/20 0.19  3/19  0.26  1/17  0.52  4/16  0.9  9/15  0.71  8/14  1.47    12/13  brachytherapy  11/13 prostate biopsy  Right3+3, 2/6, 5%  Left3+3, 1/6, 5%  2/13  4.3         Past Medical History  Blepharitis; Colon polyps; Diverticulosis; GERD; H/O prostate cancer; Hematuria (Gross); High cholesterol; Hypertension; Personal Hx Prostate Cancer; Prostate Neoplasm, Malignant; Prostatitis (Acute); Urinary retention         Past Surgical History  cardiac  stents; Cataract surgery; Colonoscopy; Hemorrhoidectomy; Prostate Biopsy; Prostate Seed Implant         Medication List  amitriptyline 75 mg oral tablet; Aspir-81 81 mg oral tablet,delayed release (DR/EC); atorvastatin 20 mg oral tablet; cholecalciferol (vitamin D3) 400 unit oral capsule; dicyclomine 20 mg oral tablet; irbesartan 300 mg oral tablet; Lasix 20 mg oral tablet; levothyroxine 100 mcg oral tablet; losartan 100 mg oral tablet; nitroglycerin 0.4 mg sublingual tablet, sublingual; nystatin-triamcinolone 100,000-0.1 unit/g-% topical cream; sotalol 80 mg oral tablet; Vitamin B-12 oral         Allergy List  Septra         Family Medical History  - No Family History of Colorectal Cancer; -None         Social History  Alcohol (Current every day); Tobacco (Former)         Review of Systems  · Constitutional  o Denies  o : chills  · Gastrointestinal  o Denies  o : nausea      Vitals  Date Time BP Position Site L\R Cuff Size HR RR TEMP (F) WT  HT  BMI kg/m2 BSA m2 O2 Sat FR L/min FiO2        10/23/2020 08:24 AM       16  246lbs 0oz 6'   33.36 2.38             Physical Examination  · Constitutional  o Appearance  o : Well-appearing, well-developed, in no acute distress  · Respiratory  o Respiratory Effort  o : Unlabored breathing  · Cardiovascular  o Heart  o :   § Auscultation of Heart  § : Regular rate and rhythm, no murmurs  · Gastrointestinal  o Abdominal Examination  o : Nontender, nondistended, no rigidity or guarding, no hepatosplenomegaly  · Neurologic  o Mental Status Examination  o :   § Orientation  § : Grossly oriented to person, place and time, judgment and insight intact, normal mood and affect       With 2 red areas on the glans penis is not mass just some erythema.           Results  · In-Office Procedures  o Lab procedure  § Automated Dipstick Urinalysis (Surg Spec) WITHOUT Micro [facility] (30084)   § Automated Dipstick Urinalysis (Surg Spec) WITHOUT Micro [facility] (08294)       Assessment  · History of  prostate cancer     V10.46/Z85.46  · Nocturia     788.43/R35.1  · Urge incontinence of urine     788.31/N39.41      Plan  · Orders  o PSA Ultrasensitive, ANNUAL SCREENING [facility] (69249, ) - V10.46/Z85.46 - 10/23/2021  · Medications  o Medications have been Reconciled  o Transition of Care or Provider Policy  · Instructions  o Electronically Identified Patient Education Materials Provided Electronically       Glans erythema/balanitis    Patient with some discoloration of the glans penis this has not changed many years but does cause some itching.  We discussed to make sure he monitors this for changes.  He will start placing some triamcinolone/nystatin cream on it to see if this can lessen the itching.    He will let me know if this changes size of or call or come in sooner.    Urination not bothersome, after discussion of starting on Flomax at this time we will not start any medication  unless things get worse    Follow-up in 1 year with a PSA before    Greater than 15 minutes was used in counseling and coordination of care, with greater than 51% of this in face-to-face counseling             Electronically Signed by: Juan Antonio Mark MD -Author on October 23, 2020 10:38:53 AM

## 2021-05-13 NOTE — PROGRESS NOTES
Progress Note      Patient Name: Michael Sol   Patient ID: [id number]   Sex: Male   YOB: 1940    Primary Care Provider: Joana Dietz PA-C   Referring Provider: Joana Dietz PA-C    Visit Date: July 13, 2020    Provider: Jere Brar MD   Location: [facility]   Location Address: [address] [place] [address], [address]   [place]  [id number]   Location Phone: [phone]          Chief Complaint  · Pedal edema       History Of Present Illness  REFERRING PROVIDER: Joana Dietz PA-C   Michael Sol is an 80-year-old white male who continues to have pedal edema. It is mild and unchanged for him. He denies any chest pain or pressure. No palpitations, shortness of breath, dizziness, syncope, PND, or orthopnea. He exercises at least an hour a day, and has lost about four pounds since his last visit.   PAST MEDICAL HISTORY: Coronary artery disease, with previous MI and stent; hypertension; hyperlipidemia; paroxysmal atrial fibrillation; chronic kidney disease.   FAMILY HISTORY: Negative for diabetes, hypertension, and heart disease.   PSYCHOSOCIAL HISTORY: No history of mood change or depression. He drinks a moderate amount of alcohol. He previously smoked but quit.   CURRENT MEDICATIONS: include aspirin 81 mg daily; fish oil 1000 mg daily; Lasix 20 mg daily; vitamin B once month; Sotalol 80 mg 1/2 tablet b.i.d.; Atorvastatin 20 mg two tablets daily; Amitriptyline 75 mg daily; Cholecalciferol 1000 mg daily; Losartan 100 mg daily; Levothyroxine 50 mcg daily.The dosage and frequency of the medications were reviewed with the patient.       Review of Systems  · Cardiovascular  o Admits  o : swelling (feet, ankles, hands)  o Denies  o : palpitations (fast, fluttering, or skipping beats), shortness of breath while walking or lying flat, chest pain or angina pectoris   · Respiratory  o Denies  o : chronic or frequent cough, asthma or wheezing      Vitals  Date Time BP Position  Site L\R Cuff Size HR RR TEMP (F) WT  HT  BMI kg/m2 BSA m2 O2 Sat HC       07/13/2020 08:54 /92 Sitting    70 - R   236lbs 0oz 6'   32.01 2.33     07/13/2020 08:54 /82 Sitting    70 - R                 Physical Examination  · Constitutional  o Appearance  o : Awake, alert, in no acute distress, somewhat hard of hearing, obese male.  · Eyes  o Conjunctivae  o : Conjunctivae normal.  · Ears, Nose, Mouth and Throat  o Oral Cavity  o :   § Oral Mucosa  § : Normal.  · Neck  o Jugular Veins  o : No JVD. Good carotid upstroke. No bruits noted.  · Respiratory  o Respiratory  o : Good respiratory effort. Clear to percussion and auscultation.  · Cardiovascular  o Heart  o : PMI is not well felt. S1, S2 normal. No S3. No S4.   o Peripheral Vascular System  o :   § Extremities  § : Good femoral and pedal pulses. Trace pedal edema.  · Gastrointestinal  o Abdominal Examination  o : Soft. No tenderness or masses felt. No hepatosplenomegaly. Abdominal aorta is not palpable.  · EKG  o Indications  o : History of atrial arrhythmias.  o Results  o : In sinus rhythm at a rate of 66 with LAFB.  o Comparison  o : Unchanged compared to EKG of January 2020.  · Labs  o Labs  o : Taken in May: Total cholesterol 115, triglyceride 134, HDL 31,LDL 57 which is good for him. BUN 21, creatinine 1.79.          Assessment     1.  Hypertension, needs tighter control.   2.  Pedal edema, stable.   3.  Paroxysmal atrial fibrillation, currently in sinus.   4.  Hyperlipidemia, mixed, with LDL at goal.   5.  Coronary artery disease without angina.   6.  Chronic kidney disease, stable 3, stable.   7.  Anemia and thrombocytopenia.       Plan     1.  Stop Losartan.   2.  Start Irbesartan 300 mg daily.   3.  Do a blood pressure log and we will adjust hypertensive meds further if needed.   4.  Check a BMP in three weeks and follow up in six months with labs and EKG, or earlier if needed.      BILL Amin/Jere Brar MD,  [facility]  NATIVIDAD/PMM/pap    This note was transcribed by Inés Gorman. NATIVIDAD/IAN/daren  The above service was transcribed by Inés Gorman on my behalf and I attest to the accuracy of the note.  NATIVIDAD/PM             Electronically Signed by: Yenny Gorman-, Other -Author on July 15, 2020 03:39:52 PM  Electronically Co-signed by: BILL Reina -Reviewer on July 19, 2020 10:54:45 AM  Electronically Co-signed by: Jere Brar MD -Reviewer on July 20, 2020 07:43:05 AM

## 2021-05-14 VITALS — WEIGHT: 246 LBS | RESPIRATION RATE: 16 BRPM | BODY MASS INDEX: 33.32 KG/M2 | HEIGHT: 72 IN

## 2021-05-14 VITALS
DIASTOLIC BLOOD PRESSURE: 90 MMHG | HEART RATE: 68 BPM | WEIGHT: 236 LBS | BODY MASS INDEX: 31.97 KG/M2 | SYSTOLIC BLOOD PRESSURE: 148 MMHG | HEIGHT: 72 IN

## 2021-05-14 NOTE — PROGRESS NOTES
Progress Note      Patient Name: Michael Sol   Patient ID: [id number]   Sex: Male   YOB: 1940    Primary Care Provider: Joana Dietz PA-C   Referring Provider: Joana Dietz PA-C    Visit Date: February 1, 2021    Provider: BILL Reina   Location: [facility]   Location Address: [address] [place] [address], [address]   [place]  [id number]   Location Phone: [phone]          Chief Complaint     Pedal edema.       History Of Present Illness  REFERRING PROVIDER: Joana Dietz PA-C   Michael Sol is a 80 year old /White male who comes in complaining of pedal edema, with left greater than right, that is long term and normal for him. He denies any chest pain, palpitations, shortness of breath, dizziness, syncope, PND, or orthopnea. His weight is the same. He is a little bit more active but he admits he is eating a lot more sweets lately.   PAST MEDICAL HISTORY: Coronary artery disease, with previous MI and stent; hypertension; hyperlipidemia; paroxysmal atrial fibrillation; chronic kidney disease.   PSYCHOSOCIAL HISTORY: Previous tobacco use but quit. Rare alcohol use.   CURRENT MEDICATIONS: Cyanocobalamin 1000 mcg once a month; amitriptyline 75 mg 1/2 tab daily; ranitidine 150 mg b.i.d.; levothyroxine 50 mcg daily; sotalol 80 mg 1/2 tab b.i.d.; atorvastatin 20 mg daily; cholecalciferol 4000 units once a day; aspirin 81 mg daily; irbesartan 300 mg daily; Lasix 20 mg daily; fish oil 1000 mg daily. The dosage and frequency of the medications were reviewed with the patient.      ALLERGIES:  Septra.       Review of Systems  · Cardiovascular  o Admits  o : swelling (feet, ankles, hands)  o Denies  o : palpitations (fast, fluttering, or skipping beats), shortness of breath while walking or lying flat, chest pain or angina pectoris   · Respiratory  o Denies  o : chronic or frequent cough, asthma or wheezing      Vitals  Date Time BP Position Site L\R Cuff Size HR RR  TEMP (F) WT  HT  BMI kg/m2 BSA m2 O2 Sat FR L/min FiO2 HC       02/01/2021 08:08 /90 Sitting    68 - R   236lbs 0oz 6'   32.01 2.33       02/01/2021 08:08 /86 Sitting    64 - R                   Physical Examination  · Constitutional  o Appearance  o : Awake, alert, in no acute distress, somewhat hard of hearing.   · Eyes  o Conjunctivae  o : Conjunctivae normal.  · Ears, Nose, Mouth and Throat  o Oral Cavity  o :   § Oral Mucosa  § : Normal.  · Neck  o Jugular Veins  o : No JVD. Good carotid upstroke. No bruits noted.  · Respiratory  o Respiratory  o : Good respiratory effort. Clear to percussion and auscultation.  · Cardiovascular  o Heart  o : PMI is normal. S1, S2 normal. No S3. No S4. Negative systolic/diastolic murmur.  o Peripheral Vascular System  o :   § Extremities  § : Good femoral and pedal pulses. Trace pedal edema, left slightly greater than the right.  · Gastrointestinal  o Abdominal Examination  o : Soft. No tenderness or masses felt. No hepatosplenomegaly. Abdominal aorta is not palpable.  · Labs  o Labs  o : Blood sugar 120, BUN 25, creatinine 1.39. Total cholesterol 144, triglyceride up to 225, HDL low at 39, but that is actually an improvement for him, and LDL 60. Thyroid was normal.      EKG: Performed in the office today.   Indications: Monitoring meds and history of atrial fibrillation.  Results: Sinus rhythm with rate of 65 and left anterior fascicular block.   Comparison: Unchanged compared to EKG of July 2020.           Assessment     1.  Pedal edema, mild and stable.   2.  Hypertension, with degree of white coat syndrome with good readings at home.   3.  Paroxysmal atrial fibrillation, currently in sinus.   4.  Hyperlipidemia, mixed, with LDL at goal but elevated triglycerides.   5.  Coronary artery disease, without angina.   6.  Chronic kidney disease, stage 3, improving.       Plan     1.  Going to stop the regular fish oil.   2.  Start Vascepa 1 gram b.i.d. for one week and  then two tabs twice a day.   3.  Continue the levothyroxine for his hypothyroidism.  4.  Continue the sotalol in view of his history of atrial arrhythmia.   5.  Continue the atorvastatin for his hyperlipidemia.   6.  Continue the Lasix and irbesartan for his hypertension.   7.  Will check a lipid, CMP, and thyroid in three months.   8.  Follow up in 10 months with labs and EKG, or earlier if needed.     BILL Amin  JF/rt                     Electronically Signed by: Maya Rodgers-, Other -Author on February 14, 2021 04:08:12 PM  Electronically Co-signed by: BILL Reina -Reviewer on February 17, 2021 01:39:51 PM

## 2021-05-15 VITALS
SYSTOLIC BLOOD PRESSURE: 130 MMHG | DIASTOLIC BLOOD PRESSURE: 74 MMHG | HEIGHT: 72 IN | OXYGEN SATURATION: 99 % | WEIGHT: 239 LBS | RESPIRATION RATE: 16 BRPM | BODY MASS INDEX: 32.37 KG/M2 | HEART RATE: 62 BPM

## 2021-05-15 VITALS
HEART RATE: 62 BPM | WEIGHT: 244 LBS | DIASTOLIC BLOOD PRESSURE: 84 MMHG | BODY MASS INDEX: 33.05 KG/M2 | SYSTOLIC BLOOD PRESSURE: 130 MMHG | HEIGHT: 72 IN

## 2021-05-15 VITALS
BODY MASS INDEX: 31.97 KG/M2 | HEART RATE: 70 BPM | SYSTOLIC BLOOD PRESSURE: 162 MMHG | HEIGHT: 72 IN | WEIGHT: 236 LBS | DIASTOLIC BLOOD PRESSURE: 92 MMHG

## 2021-05-15 VITALS
DIASTOLIC BLOOD PRESSURE: 88 MMHG | WEIGHT: 242 LBS | HEART RATE: 58 BPM | HEIGHT: 72 IN | BODY MASS INDEX: 32.78 KG/M2 | SYSTOLIC BLOOD PRESSURE: 160 MMHG

## 2021-05-15 VITALS
SYSTOLIC BLOOD PRESSURE: 140 MMHG | HEART RATE: 60 BPM | HEIGHT: 72 IN | DIASTOLIC BLOOD PRESSURE: 78 MMHG | BODY MASS INDEX: 32.51 KG/M2 | WEIGHT: 240 LBS

## 2021-05-15 VITALS — HEIGHT: 72 IN | RESPIRATION RATE: 17 BRPM | WEIGHT: 246.37 LBS | BODY MASS INDEX: 33.37 KG/M2

## 2021-05-16 VITALS
HEIGHT: 72 IN | HEART RATE: 60 BPM | DIASTOLIC BLOOD PRESSURE: 72 MMHG | BODY MASS INDEX: 33.18 KG/M2 | WEIGHT: 245 LBS | SYSTOLIC BLOOD PRESSURE: 130 MMHG

## 2021-05-16 VITALS
SYSTOLIC BLOOD PRESSURE: 130 MMHG | BODY MASS INDEX: 33.05 KG/M2 | WEIGHT: 244 LBS | DIASTOLIC BLOOD PRESSURE: 82 MMHG | HEIGHT: 72 IN | HEART RATE: 66 BPM

## 2021-05-22 ENCOUNTER — TRANSCRIBE ORDERS (OUTPATIENT)
Dept: CARDIAC REHAB | Facility: HOSPITAL | Age: 81
End: 2021-05-22

## 2021-05-22 DIAGNOSIS — Z95.5 STATUS POST INSERTION OF DRUG ELUTING CORONARY ARTERY STENT: Primary | ICD-10-CM

## 2021-06-08 ENCOUNTER — APPOINTMENT (OUTPATIENT)
Dept: CARDIAC REHAB | Facility: HOSPITAL | Age: 81
End: 2021-06-08

## 2021-06-10 ENCOUNTER — APPOINTMENT (OUTPATIENT)
Dept: CARDIAC REHAB | Facility: HOSPITAL | Age: 81
End: 2021-06-10

## 2021-06-14 ENCOUNTER — OFFICE VISIT (OUTPATIENT)
Dept: CARDIAC REHAB | Facility: HOSPITAL | Age: 81
End: 2021-06-14

## 2021-06-14 DIAGNOSIS — Z95.5 HISTORY OF HEART ARTERY STENT: Primary | ICD-10-CM

## 2021-06-15 ENCOUNTER — APPOINTMENT (OUTPATIENT)
Dept: CARDIAC REHAB | Facility: HOSPITAL | Age: 81
End: 2021-06-15

## 2021-06-16 ENCOUNTER — OFFICE VISIT (OUTPATIENT)
Dept: CARDIAC REHAB | Facility: HOSPITAL | Age: 81
End: 2021-06-16

## 2021-06-16 DIAGNOSIS — Z95.5 HISTORY OF HEART ARTERY STENT: Primary | ICD-10-CM

## 2021-06-17 ENCOUNTER — APPOINTMENT (OUTPATIENT)
Dept: CARDIAC REHAB | Facility: HOSPITAL | Age: 81
End: 2021-06-17

## 2021-06-21 ENCOUNTER — OFFICE VISIT (OUTPATIENT)
Dept: CARDIAC REHAB | Facility: HOSPITAL | Age: 81
End: 2021-06-21

## 2021-06-21 DIAGNOSIS — Z95.5 HISTORY OF HEART ARTERY STENT: Primary | ICD-10-CM

## 2021-06-22 ENCOUNTER — APPOINTMENT (OUTPATIENT)
Dept: CARDIAC REHAB | Facility: HOSPITAL | Age: 81
End: 2021-06-22

## 2021-06-23 ENCOUNTER — OFFICE VISIT (OUTPATIENT)
Dept: CARDIAC REHAB | Facility: HOSPITAL | Age: 81
End: 2021-06-23

## 2021-06-23 ENCOUNTER — APPOINTMENT (OUTPATIENT)
Dept: CARDIOLOGY | Facility: HOSPITAL | Age: 81
End: 2021-06-23

## 2021-06-23 ENCOUNTER — LAB (OUTPATIENT)
Dept: LAB | Facility: HOSPITAL | Age: 81
End: 2021-06-23

## 2021-06-23 ENCOUNTER — TRANSCRIBE ORDERS (OUTPATIENT)
Dept: LAB | Facility: HOSPITAL | Age: 81
End: 2021-06-23

## 2021-06-23 ENCOUNTER — TRANSCRIBE ORDERS (OUTPATIENT)
Dept: ADMINISTRATIVE | Facility: HOSPITAL | Age: 81
End: 2021-06-23

## 2021-06-23 DIAGNOSIS — Z95.5 HISTORY OF HEART ARTERY STENT: Primary | ICD-10-CM

## 2021-06-23 DIAGNOSIS — I10 ESSENTIAL HYPERTENSION, MALIGNANT: ICD-10-CM

## 2021-06-23 DIAGNOSIS — N40.0 BENIGN PROSTATIC HYPERPLASIA, UNSPECIFIED WHETHER LOWER URINARY TRACT SYMPTOMS PRESENT: ICD-10-CM

## 2021-06-23 DIAGNOSIS — R06.02 SHORTNESS OF BREATH: ICD-10-CM

## 2021-06-23 DIAGNOSIS — Z12.5 SPECIAL SCREENING FOR MALIGNANT NEOPLASM OF PROSTATE: ICD-10-CM

## 2021-06-23 DIAGNOSIS — I83.893 VARICOSE VEINS OF LEG WITH SWELLING, BILATERAL: Primary | ICD-10-CM

## 2021-06-23 DIAGNOSIS — E78.5 HYPERLIPIDEMIA, UNSPECIFIED HYPERLIPIDEMIA TYPE: ICD-10-CM

## 2021-06-23 DIAGNOSIS — E03.9 HYPOTHYROIDISM, UNSPECIFIED TYPE: ICD-10-CM

## 2021-06-23 DIAGNOSIS — R73.01 IMPAIRED FASTING GLUCOSE: ICD-10-CM

## 2021-06-23 DIAGNOSIS — M79.89 LEFT LEG SWELLING: Primary | ICD-10-CM

## 2021-06-23 DIAGNOSIS — I10 ESSENTIAL HYPERTENSION, MALIGNANT: Primary | ICD-10-CM

## 2021-06-23 DIAGNOSIS — M79.89 RIGHT LEG SWELLING: ICD-10-CM

## 2021-06-23 LAB
ALBUMIN SERPL-MCNC: 4.2 G/DL (ref 3.5–5.2)
ALBUMIN/GLOB SERPL: 2.2 G/DL
ALP SERPL-CCNC: 73 U/L (ref 39–117)
ALT SERPL W P-5'-P-CCNC: 17 U/L (ref 1–41)
ANION GAP SERPL CALCULATED.3IONS-SCNC: 3.8 MMOL/L (ref 5–15)
AST SERPL-CCNC: 19 U/L (ref 1–40)
BASOPHILS # BLD AUTO: 0.02 10*3/MM3 (ref 0–0.2)
BASOPHILS NFR BLD AUTO: 0.4 % (ref 0–1.5)
BILIRUB SERPL-MCNC: 0.4 MG/DL (ref 0–1.2)
BUN SERPL-MCNC: 23 MG/DL (ref 8–23)
BUN/CREAT SERPL: 15.5 (ref 7–25)
CALCIUM SPEC-SCNC: 9.3 MG/DL (ref 8.6–10.5)
CHLORIDE SERPL-SCNC: 103 MMOL/L (ref 98–107)
CHOLEST SERPL-MCNC: 135 MG/DL (ref 0–200)
CO2 SERPL-SCNC: 31.2 MMOL/L (ref 22–29)
CREAT SERPL-MCNC: 1.48 MG/DL (ref 0.76–1.27)
DEPRECATED RDW RBC AUTO: 45.1 FL (ref 37–54)
EOSINOPHIL # BLD AUTO: 0.18 10*3/MM3 (ref 0–0.4)
EOSINOPHIL NFR BLD AUTO: 3.7 % (ref 0.3–6.2)
ERYTHROCYTE [DISTWIDTH] IN BLOOD BY AUTOMATED COUNT: 13.3 % (ref 12.3–15.4)
FOLATE SERPL-MCNC: 7.85 NG/ML (ref 4.78–24.2)
GFR SERPL CREATININE-BSD FRML MDRD: 46 ML/MIN/1.73
GLOBULIN UR ELPH-MCNC: 1.9 GM/DL
GLUCOSE SERPL-MCNC: 99 MG/DL (ref 65–99)
HBA1C MFR BLD: 5.46 % (ref 4.8–5.6)
HCT VFR BLD AUTO: 40.6 % (ref 37.5–51)
HDLC SERPL-MCNC: 34 MG/DL (ref 40–60)
HGB BLD-MCNC: 13.7 G/DL (ref 13–17.7)
IMM GRANULOCYTES # BLD AUTO: 0.01 10*3/MM3 (ref 0–0.05)
IMM GRANULOCYTES NFR BLD AUTO: 0.2 % (ref 0–0.5)
LDLC SERPL CALC-MCNC: 58 MG/DL (ref 0–100)
LDLC/HDLC SERPL: 1.35 {RATIO}
LYMPHOCYTES # BLD AUTO: 1.38 10*3/MM3 (ref 0.7–3.1)
LYMPHOCYTES NFR BLD AUTO: 28.5 % (ref 19.6–45.3)
MCH RBC QN AUTO: 31.2 PG (ref 26.6–33)
MCHC RBC AUTO-ENTMCNC: 33.7 G/DL (ref 31.5–35.7)
MCV RBC AUTO: 92.5 FL (ref 79–97)
MONOCYTES # BLD AUTO: 0.47 10*3/MM3 (ref 0.1–0.9)
MONOCYTES NFR BLD AUTO: 9.7 % (ref 5–12)
NEUTROPHILS NFR BLD AUTO: 2.78 10*3/MM3 (ref 1.7–7)
NEUTROPHILS NFR BLD AUTO: 57.5 % (ref 42.7–76)
NRBC BLD AUTO-RTO: 0 /100 WBC (ref 0–0.2)
NT-PROBNP SERPL-MCNC: 17.7 PG/ML (ref 0–1800)
PLATELET # BLD AUTO: 139 10*3/MM3 (ref 140–450)
PMV BLD AUTO: 10.1 FL (ref 6–12)
POTASSIUM SERPL-SCNC: 4.7 MMOL/L (ref 3.5–5.2)
PROT SERPL-MCNC: 6.1 G/DL (ref 6–8.5)
PSA SERPL-MCNC: 0.25 NG/ML (ref 0–4)
RBC # BLD AUTO: 4.39 10*6/MM3 (ref 4.14–5.8)
SODIUM SERPL-SCNC: 138 MMOL/L (ref 136–145)
T4 FREE SERPL-MCNC: 1.2 NG/DL (ref 0.93–1.7)
TRIGL SERPL-MCNC: 275 MG/DL (ref 0–150)
TSH SERPL DL<=0.05 MIU/L-ACNC: 1.9 UIU/ML (ref 0.27–4.2)
VIT B12 BLD-MCNC: 405 PG/ML (ref 211–946)
VLDLC SERPL-MCNC: 43 MG/DL (ref 5–40)
WBC # BLD AUTO: 4.84 10*3/MM3 (ref 3.4–10.8)

## 2021-06-23 PROCEDURE — 80053 COMPREHEN METABOLIC PANEL: CPT

## 2021-06-23 PROCEDURE — 84443 ASSAY THYROID STIM HORMONE: CPT

## 2021-06-23 PROCEDURE — 36415 COLL VENOUS BLD VENIPUNCTURE: CPT

## 2021-06-23 PROCEDURE — 82746 ASSAY OF FOLIC ACID SERUM: CPT

## 2021-06-23 PROCEDURE — 84439 ASSAY OF FREE THYROXINE: CPT

## 2021-06-23 PROCEDURE — 85025 COMPLETE CBC W/AUTO DIFF WBC: CPT

## 2021-06-23 PROCEDURE — 82607 VITAMIN B-12: CPT

## 2021-06-23 PROCEDURE — 80061 LIPID PANEL: CPT

## 2021-06-23 PROCEDURE — 83880 ASSAY OF NATRIURETIC PEPTIDE: CPT

## 2021-06-23 PROCEDURE — G0103 PSA SCREENING: HCPCS

## 2021-06-23 PROCEDURE — 83036 HEMOGLOBIN GLYCOSYLATED A1C: CPT

## 2021-06-24 ENCOUNTER — HOSPITAL ENCOUNTER (OUTPATIENT)
Dept: CARDIOLOGY | Facility: HOSPITAL | Age: 81
Discharge: HOME | End: 2021-06-24
Admitting: INTERNAL MEDICINE

## 2021-06-24 ENCOUNTER — APPOINTMENT (OUTPATIENT)
Dept: CARDIAC REHAB | Facility: HOSPITAL | Age: 81
End: 2021-06-24

## 2021-06-24 DIAGNOSIS — M79.89 LEFT LEG SWELLING: ICD-10-CM

## 2021-06-24 DIAGNOSIS — M79.89 RIGHT LEG SWELLING: ICD-10-CM

## 2021-06-24 LAB

## 2021-06-24 PROCEDURE — 93970 EXTREMITY STUDY: CPT | Performed by: SURGERY

## 2021-06-24 PROCEDURE — 93970 EXTREMITY STUDY: CPT

## 2021-06-28 ENCOUNTER — OFFICE VISIT (OUTPATIENT)
Dept: CARDIAC REHAB | Facility: HOSPITAL | Age: 81
End: 2021-06-28

## 2021-06-28 DIAGNOSIS — Z95.5 HISTORY OF HEART ARTERY STENT: Primary | ICD-10-CM

## 2021-06-29 ENCOUNTER — APPOINTMENT (OUTPATIENT)
Dept: CARDIAC REHAB | Facility: HOSPITAL | Age: 81
End: 2021-06-29

## 2021-06-29 ENCOUNTER — OFFICE VISIT (OUTPATIENT)
Dept: CARDIAC REHAB | Facility: HOSPITAL | Age: 81
End: 2021-06-29

## 2021-06-29 DIAGNOSIS — Z95.5 HISTORY OF HEART ARTERY STENT: Primary | ICD-10-CM

## 2021-06-30 ENCOUNTER — APPOINTMENT (OUTPATIENT)
Dept: CARDIAC REHAB | Facility: HOSPITAL | Age: 81
End: 2021-06-30

## 2021-07-01 ENCOUNTER — APPOINTMENT (OUTPATIENT)
Dept: CARDIAC REHAB | Facility: HOSPITAL | Age: 81
End: 2021-07-01

## 2021-07-05 ENCOUNTER — APPOINTMENT (OUTPATIENT)
Dept: CARDIAC REHAB | Facility: HOSPITAL | Age: 81
End: 2021-07-05

## 2021-07-06 ENCOUNTER — APPOINTMENT (OUTPATIENT)
Dept: CARDIAC REHAB | Facility: HOSPITAL | Age: 81
End: 2021-07-06

## 2021-07-06 ENCOUNTER — OFFICE VISIT (OUTPATIENT)
Dept: CARDIAC REHAB | Facility: HOSPITAL | Age: 81
End: 2021-07-06

## 2021-07-06 DIAGNOSIS — Z95.5 HISTORY OF HEART ARTERY STENT: Primary | ICD-10-CM

## 2021-07-07 ENCOUNTER — OFFICE VISIT (OUTPATIENT)
Dept: CARDIAC REHAB | Facility: HOSPITAL | Age: 81
End: 2021-07-07

## 2021-07-07 DIAGNOSIS — Z95.5 HISTORY OF HEART ARTERY STENT: Primary | ICD-10-CM

## 2021-07-08 ENCOUNTER — APPOINTMENT (OUTPATIENT)
Dept: CARDIAC REHAB | Facility: HOSPITAL | Age: 81
End: 2021-07-08

## 2021-07-12 ENCOUNTER — APPOINTMENT (OUTPATIENT)
Dept: CARDIAC REHAB | Facility: HOSPITAL | Age: 81
End: 2021-07-12

## 2021-07-13 ENCOUNTER — APPOINTMENT (OUTPATIENT)
Dept: CARDIAC REHAB | Facility: HOSPITAL | Age: 81
End: 2021-07-13

## 2021-07-14 ENCOUNTER — APPOINTMENT (OUTPATIENT)
Dept: CARDIAC REHAB | Facility: HOSPITAL | Age: 81
End: 2021-07-14

## 2021-07-15 ENCOUNTER — APPOINTMENT (OUTPATIENT)
Dept: CARDIAC REHAB | Facility: HOSPITAL | Age: 81
End: 2021-07-15

## 2021-07-20 ENCOUNTER — APPOINTMENT (OUTPATIENT)
Dept: CARDIAC REHAB | Facility: HOSPITAL | Age: 81
End: 2021-07-20

## 2021-07-22 ENCOUNTER — APPOINTMENT (OUTPATIENT)
Dept: CARDIAC REHAB | Facility: HOSPITAL | Age: 81
End: 2021-07-22

## 2021-07-27 ENCOUNTER — APPOINTMENT (OUTPATIENT)
Dept: CARDIAC REHAB | Facility: HOSPITAL | Age: 81
End: 2021-07-27

## 2021-07-28 ENCOUNTER — APPOINTMENT (OUTPATIENT)
Dept: CARDIAC REHAB | Facility: HOSPITAL | Age: 81
End: 2021-07-28

## 2021-07-29 ENCOUNTER — APPOINTMENT (OUTPATIENT)
Dept: CARDIAC REHAB | Facility: HOSPITAL | Age: 81
End: 2021-07-29

## 2021-08-02 ENCOUNTER — APPOINTMENT (OUTPATIENT)
Dept: CARDIAC REHAB | Facility: HOSPITAL | Age: 81
End: 2021-08-02

## 2021-08-03 ENCOUNTER — OFFICE VISIT (OUTPATIENT)
Dept: CARDIAC REHAB | Facility: HOSPITAL | Age: 81
End: 2021-08-03

## 2021-08-03 ENCOUNTER — APPOINTMENT (OUTPATIENT)
Dept: CARDIAC REHAB | Facility: HOSPITAL | Age: 81
End: 2021-08-03

## 2021-08-03 DIAGNOSIS — Z95.5 HISTORY OF HEART ARTERY STENT: Primary | ICD-10-CM

## 2021-08-04 ENCOUNTER — APPOINTMENT (OUTPATIENT)
Dept: CARDIAC REHAB | Facility: HOSPITAL | Age: 81
End: 2021-08-04

## 2021-08-05 ENCOUNTER — OFFICE VISIT (OUTPATIENT)
Dept: CARDIAC REHAB | Facility: HOSPITAL | Age: 81
End: 2021-08-05

## 2021-08-05 ENCOUNTER — APPOINTMENT (OUTPATIENT)
Dept: CARDIAC REHAB | Facility: HOSPITAL | Age: 81
End: 2021-08-05

## 2021-08-05 DIAGNOSIS — Z95.5 HISTORY OF HEART ARTERY STENT: Primary | ICD-10-CM

## 2021-08-09 ENCOUNTER — APPOINTMENT (OUTPATIENT)
Dept: CARDIAC REHAB | Facility: HOSPITAL | Age: 81
End: 2021-08-09

## 2021-08-10 ENCOUNTER — OFFICE VISIT (OUTPATIENT)
Dept: CARDIAC REHAB | Facility: HOSPITAL | Age: 81
End: 2021-08-10

## 2021-08-10 ENCOUNTER — APPOINTMENT (OUTPATIENT)
Dept: CARDIAC REHAB | Facility: HOSPITAL | Age: 81
End: 2021-08-10

## 2021-08-10 DIAGNOSIS — Z95.5 HISTORY OF HEART ARTERY STENT: Primary | ICD-10-CM

## 2021-08-11 ENCOUNTER — APPOINTMENT (OUTPATIENT)
Dept: CARDIAC REHAB | Facility: HOSPITAL | Age: 81
End: 2021-08-11

## 2021-08-12 ENCOUNTER — APPOINTMENT (OUTPATIENT)
Dept: CARDIAC REHAB | Facility: HOSPITAL | Age: 81
End: 2021-08-12

## 2021-08-16 ENCOUNTER — APPOINTMENT (OUTPATIENT)
Dept: CARDIAC REHAB | Facility: HOSPITAL | Age: 81
End: 2021-08-16

## 2021-08-17 ENCOUNTER — APPOINTMENT (OUTPATIENT)
Dept: CARDIAC REHAB | Facility: HOSPITAL | Age: 81
End: 2021-08-17

## 2021-08-18 ENCOUNTER — APPOINTMENT (OUTPATIENT)
Dept: CARDIAC REHAB | Facility: HOSPITAL | Age: 81
End: 2021-08-18

## 2021-08-19 ENCOUNTER — APPOINTMENT (OUTPATIENT)
Dept: CARDIAC REHAB | Facility: HOSPITAL | Age: 81
End: 2021-08-19

## 2021-08-23 ENCOUNTER — APPOINTMENT (OUTPATIENT)
Dept: CARDIAC REHAB | Facility: HOSPITAL | Age: 81
End: 2021-08-23

## 2021-08-24 ENCOUNTER — APPOINTMENT (OUTPATIENT)
Dept: CARDIAC REHAB | Facility: HOSPITAL | Age: 81
End: 2021-08-24

## 2021-08-25 ENCOUNTER — APPOINTMENT (OUTPATIENT)
Dept: CARDIAC REHAB | Facility: HOSPITAL | Age: 81
End: 2021-08-25

## 2021-08-26 ENCOUNTER — APPOINTMENT (OUTPATIENT)
Dept: CARDIAC REHAB | Facility: HOSPITAL | Age: 81
End: 2021-08-26

## 2021-08-30 ENCOUNTER — APPOINTMENT (OUTPATIENT)
Dept: CARDIAC REHAB | Facility: HOSPITAL | Age: 81
End: 2021-08-30

## 2021-08-31 ENCOUNTER — APPOINTMENT (OUTPATIENT)
Dept: CARDIAC REHAB | Facility: HOSPITAL | Age: 81
End: 2021-08-31

## 2021-09-01 ENCOUNTER — APPOINTMENT (OUTPATIENT)
Dept: CARDIAC REHAB | Facility: HOSPITAL | Age: 81
End: 2021-09-01

## 2021-09-02 ENCOUNTER — APPOINTMENT (OUTPATIENT)
Dept: CARDIAC REHAB | Facility: HOSPITAL | Age: 81
End: 2021-09-02

## 2021-09-06 ENCOUNTER — APPOINTMENT (OUTPATIENT)
Dept: CARDIAC REHAB | Facility: HOSPITAL | Age: 81
End: 2021-09-06

## 2021-09-07 ENCOUNTER — APPOINTMENT (OUTPATIENT)
Dept: CARDIAC REHAB | Facility: HOSPITAL | Age: 81
End: 2021-09-07

## 2021-09-08 ENCOUNTER — APPOINTMENT (OUTPATIENT)
Dept: CARDIAC REHAB | Facility: HOSPITAL | Age: 81
End: 2021-09-08

## 2021-09-09 ENCOUNTER — APPOINTMENT (OUTPATIENT)
Dept: CARDIAC REHAB | Facility: HOSPITAL | Age: 81
End: 2021-09-09

## 2021-09-13 ENCOUNTER — APPOINTMENT (OUTPATIENT)
Dept: CARDIAC REHAB | Facility: HOSPITAL | Age: 81
End: 2021-09-13

## 2021-09-14 ENCOUNTER — LAB (OUTPATIENT)
Dept: LAB | Facility: HOSPITAL | Age: 81
End: 2021-09-14

## 2021-09-14 ENCOUNTER — APPOINTMENT (OUTPATIENT)
Dept: CARDIAC REHAB | Facility: HOSPITAL | Age: 81
End: 2021-09-14

## 2021-09-14 ENCOUNTER — TRANSCRIBE ORDERS (OUTPATIENT)
Dept: ADMINISTRATIVE | Facility: HOSPITAL | Age: 81
End: 2021-09-14

## 2021-09-14 DIAGNOSIS — R73.01 IMPAIRED FASTING GLUCOSE: ICD-10-CM

## 2021-09-14 DIAGNOSIS — I10 HYPERTENSION, UNSPECIFIED TYPE: ICD-10-CM

## 2021-09-14 DIAGNOSIS — I10 HYPERTENSION, UNSPECIFIED TYPE: Primary | ICD-10-CM

## 2021-09-14 DIAGNOSIS — E03.9 HYPOTHYROIDISM, UNSPECIFIED TYPE: ICD-10-CM

## 2021-09-14 LAB
ALBUMIN SERPL-MCNC: 4.3 G/DL (ref 3.5–5.2)
ALBUMIN/GLOB SERPL: 1.8 G/DL
ALP SERPL-CCNC: 82 U/L (ref 39–117)
ALT SERPL W P-5'-P-CCNC: 18 U/L (ref 1–41)
ANION GAP SERPL CALCULATED.3IONS-SCNC: 12 MMOL/L (ref 5–15)
AST SERPL-CCNC: 18 U/L (ref 1–40)
BASOPHILS # BLD AUTO: 0.05 10*3/MM3 (ref 0–0.2)
BASOPHILS NFR BLD AUTO: 0.9 % (ref 0–1.5)
BILIRUB SERPL-MCNC: 0.6 MG/DL (ref 0–1.2)
BUN SERPL-MCNC: 19 MG/DL (ref 8–23)
BUN/CREAT SERPL: 11.7 (ref 7–25)
CALCIUM SPEC-SCNC: 9.1 MG/DL (ref 8.6–10.5)
CHLORIDE SERPL-SCNC: 102 MMOL/L (ref 98–107)
CO2 SERPL-SCNC: 25 MMOL/L (ref 22–29)
CREAT SERPL-MCNC: 1.62 MG/DL (ref 0.76–1.27)
DEPRECATED RDW RBC AUTO: 46.5 FL (ref 37–54)
EOSINOPHIL # BLD AUTO: 0.39 10*3/MM3 (ref 0–0.4)
EOSINOPHIL NFR BLD AUTO: 7.2 % (ref 0.3–6.2)
ERYTHROCYTE [DISTWIDTH] IN BLOOD BY AUTOMATED COUNT: 13.6 % (ref 12.3–15.4)
GFR SERPL CREATININE-BSD FRML MDRD: 41 ML/MIN/1.73
GLOBULIN UR ELPH-MCNC: 2.4 GM/DL
GLUCOSE SERPL-MCNC: 111 MG/DL (ref 65–99)
HCT VFR BLD AUTO: 43.7 % (ref 37.5–51)
HGB BLD-MCNC: 14.9 G/DL (ref 13–17.7)
LYMPHOCYTES # BLD AUTO: 1.48 10*3/MM3 (ref 0.7–3.1)
LYMPHOCYTES NFR BLD AUTO: 27.2 % (ref 19.6–45.3)
MCH RBC QN AUTO: 31.6 PG (ref 26.6–33)
MCHC RBC AUTO-ENTMCNC: 34.1 G/DL (ref 31.5–35.7)
MCV RBC AUTO: 92.6 FL (ref 79–97)
MONOCYTES # BLD AUTO: 0.46 10*3/MM3 (ref 0.1–0.9)
MONOCYTES NFR BLD AUTO: 8.5 % (ref 5–12)
NEUTROPHILS NFR BLD AUTO: 3.04 10*3/MM3 (ref 1.7–7)
NEUTROPHILS NFR BLD AUTO: 55.8 % (ref 42.7–76)
NT-PROBNP SERPL-MCNC: 30.7 PG/ML (ref 0–1800)
PLATELET # BLD AUTO: 148 10*3/MM3 (ref 140–450)
PMV BLD AUTO: 10.1 FL (ref 6–12)
POTASSIUM SERPL-SCNC: 4.5 MMOL/L (ref 3.5–5.2)
PROT SERPL-MCNC: 6.7 G/DL (ref 6–8.5)
RBC # BLD AUTO: 4.72 10*6/MM3 (ref 4.14–5.8)
SODIUM SERPL-SCNC: 139 MMOL/L (ref 136–145)
TSH SERPL DL<=0.05 MIU/L-ACNC: 3.44 UIU/ML (ref 0.27–4.2)
WBC # BLD AUTO: 5.44 10*3/MM3 (ref 3.4–10.8)

## 2021-09-14 PROCEDURE — 80053 COMPREHEN METABOLIC PANEL: CPT

## 2021-09-14 PROCEDURE — 83880 ASSAY OF NATRIURETIC PEPTIDE: CPT

## 2021-09-14 PROCEDURE — 85025 COMPLETE CBC W/AUTO DIFF WBC: CPT

## 2021-09-14 PROCEDURE — 36415 COLL VENOUS BLD VENIPUNCTURE: CPT

## 2021-09-14 PROCEDURE — 84443 ASSAY THYROID STIM HORMONE: CPT

## 2021-09-15 ENCOUNTER — APPOINTMENT (OUTPATIENT)
Dept: CARDIAC REHAB | Facility: HOSPITAL | Age: 81
End: 2021-09-15

## 2021-09-16 ENCOUNTER — TELEPHONE (OUTPATIENT)
Dept: UROLOGY | Facility: CLINIC | Age: 81
End: 2021-09-16

## 2021-09-16 ENCOUNTER — APPOINTMENT (OUTPATIENT)
Dept: CARDIAC REHAB | Facility: HOSPITAL | Age: 81
End: 2021-09-16

## 2021-09-19 PROBLEM — E53.8 VITAMIN B 12 DEFICIENCY: Status: ACTIVE | Noted: 2021-09-19

## 2021-09-19 PROBLEM — I25.10 CAD (CORONARY ARTERY DISEASE): Status: ACTIVE | Noted: 2021-09-19

## 2021-09-19 PROBLEM — H01.009 BLEPHARITIS: Status: ACTIVE | Noted: 2021-09-19

## 2021-09-19 PROBLEM — I10 HYPERTENSION: Status: ACTIVE | Noted: 2021-09-19

## 2021-09-19 PROBLEM — K57.90 DIVERTICULOSIS: Status: ACTIVE | Noted: 2021-09-19

## 2021-09-19 PROBLEM — I48.91 ATRIAL FIBRILLATION: Status: ACTIVE | Noted: 2021-09-19

## 2021-09-19 PROBLEM — E03.9 HYPOTHYROIDISM: Status: ACTIVE | Noted: 2021-09-19

## 2021-09-19 PROBLEM — I67.1 BRAIN ANEURYSM: Status: ACTIVE | Noted: 2017-01-31

## 2021-09-19 PROBLEM — K63.5 COLON POLYPS: Status: ACTIVE | Noted: 2021-09-19

## 2021-09-19 PROBLEM — K21.9 ESOPHAGEAL REFLUX: Status: ACTIVE | Noted: 2021-09-19

## 2021-09-19 PROBLEM — E78.5 HYPERLIPIDEMIA: Status: ACTIVE | Noted: 2021-09-19

## 2021-09-20 ENCOUNTER — OFFICE VISIT (OUTPATIENT)
Dept: INTERNAL MEDICINE | Facility: CLINIC | Age: 81
End: 2021-09-20

## 2021-09-20 ENCOUNTER — APPOINTMENT (OUTPATIENT)
Dept: CARDIAC REHAB | Facility: HOSPITAL | Age: 81
End: 2021-09-20

## 2021-09-20 VITALS
WEIGHT: 244 LBS | BODY MASS INDEX: 33.05 KG/M2 | TEMPERATURE: 98.2 F | OXYGEN SATURATION: 95 % | HEIGHT: 72 IN | DIASTOLIC BLOOD PRESSURE: 95 MMHG | HEART RATE: 63 BPM | SYSTOLIC BLOOD PRESSURE: 155 MMHG

## 2021-09-20 DIAGNOSIS — E03.9 HYPOTHYROIDISM, UNSPECIFIED TYPE: Chronic | ICD-10-CM

## 2021-09-20 DIAGNOSIS — I10 ESSENTIAL HYPERTENSION: Chronic | ICD-10-CM

## 2021-09-20 DIAGNOSIS — R73.01 IMPAIRED FASTING GLUCOSE: ICD-10-CM

## 2021-09-20 DIAGNOSIS — E78.5 HYPERLIPIDEMIA, UNSPECIFIED HYPERLIPIDEMIA TYPE: Chronic | ICD-10-CM

## 2021-09-20 DIAGNOSIS — I10 ESSENTIAL HYPERTENSION: Primary | Chronic | ICD-10-CM

## 2021-09-20 DIAGNOSIS — E53.8 VITAMIN B 12 DEFICIENCY: ICD-10-CM

## 2021-09-20 DIAGNOSIS — N18.32 STAGE 3B CHRONIC KIDNEY DISEASE (HCC): Chronic | ICD-10-CM

## 2021-09-20 DIAGNOSIS — E55.9 VITAMIN D DEFICIENCY: ICD-10-CM

## 2021-09-20 PROCEDURE — 99214 OFFICE O/P EST MOD 30 MIN: CPT | Performed by: NURSE PRACTITIONER

## 2021-09-20 RX ORDER — ATORVASTATIN CALCIUM 40 MG/1
40 TABLET, FILM COATED ORAL DAILY
COMMUNITY
End: 2021-12-02 | Stop reason: SDUPTHER

## 2021-09-20 RX ORDER — AMLODIPINE BESYLATE 10 MG/1
10 TABLET ORAL DAILY
COMMUNITY

## 2021-09-20 RX ORDER — IRBESARTAN 300 MG/1
300 TABLET ORAL NIGHTLY
Qty: 30 TABLET | Refills: 1 | Status: SHIPPED | OUTPATIENT
Start: 2021-09-20 | End: 2021-09-20

## 2021-09-20 RX ORDER — LEVOTHYROXINE SODIUM 0.05 MG/1
50 TABLET ORAL DAILY
COMMUNITY

## 2021-09-20 RX ORDER — CYANOCOBALAMIN 1000 UG/ML
1000 INJECTION, SOLUTION INTRAMUSCULAR; SUBCUTANEOUS
COMMUNITY

## 2021-09-20 RX ORDER — SOTALOL HYDROCHLORIDE 80 MG/1
20 TABLET ORAL 2 TIMES DAILY
COMMUNITY
End: 2021-12-02 | Stop reason: ALTCHOICE

## 2021-09-20 RX ORDER — AMITRIPTYLINE HYDROCHLORIDE 75 MG/1
37.5 TABLET, FILM COATED ORAL NIGHTLY
COMMUNITY

## 2021-09-20 RX ORDER — MELATONIN
1000 DAILY
COMMUNITY

## 2021-09-20 RX ORDER — LANOLIN ALCOHOL/MO/W.PET/CERES
1000 CREAM (GRAM) TOPICAL DAILY
COMMUNITY
End: 2021-09-20

## 2021-09-20 RX ORDER — ASPIRIN 81 MG/1
81 TABLET ORAL DAILY
COMMUNITY

## 2021-09-20 RX ORDER — IRBESARTAN 300 MG/1
TABLET ORAL
Qty: 90 TABLET | Refills: 1 | Status: SHIPPED | OUTPATIENT
Start: 2021-09-20 | End: 2021-12-01 | Stop reason: SDUPTHER

## 2021-09-20 RX ORDER — IRBESARTAN 300 MG/1
300 TABLET ORAL NIGHTLY
COMMUNITY
End: 2021-09-20 | Stop reason: SDUPTHER

## 2021-09-20 NOTE — PROGRESS NOTES
Chief Complaint  Follow-up (labbs done.   )    Subjective    History of Present Illness:  Michael Sol presents to [facility]  for follow-up chronic disease management and repeat lab review.  The patient is not having any medication side effects.  Recent labs were reviewed with the patient.  He denies chest pain, shortness of air, abdominal pain, or change in bowel habits.  He reports his home blood pressures average 150/80.  He reports that his ankles swell after being on his feet all day. 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  [facility] is on [facility] medication and how long he has been on it.    Also[facility] and gets most of his medications through there, Dr. Higgins,  Dr. Mark, Dr. Street, and  Dr. Brar. Colonoscopy per Dr. Derrick Garcia 9/2019 and due 2024. Cardiac stent placed 2007.  He has had the Prevnar 13 2015, Pneumovax 23 2010, Covid vaccine 2021, flu vaccine 2021, and Shingrix.    Component      Latest Ref Rng & Units 1/3/2020 5/4/2020 8/31/2020 1/25/2021              8:12 AM   Glucose      65 - 99 mg/dL 119 (H) 114 (H) 111 (H) 119 (H)   BUN      8 - 23 mg/dL 36 (H) 21 25 24   Creatinine      0.76 - 1.27 mg/dL 1.81 (H) 1.79 (H) 1.74 (H) 1.38 (H)   Sodium      136 - 145 mmol/L 137 139 141 137   Potassium      3.5 - 5.2 mmol/L 4.7 5.1 4.9 4.3   Chloride      98 - 107 mmol/L 100 101 102 101   CO2      22.0 - 29.0 mmol/L       Calcium      8.6 - 10.5 mg/dL 9.5 9.3 9.8 9.0   Total Protein      6.0 - 8.5 g/dL 6.6 6.6     Albumin      3.50 - 5.20 g/dL 4.3 4.1  3.9   ALT (SGPT)      1 - 41 U/L 23 25     AST (SGOT)      1 - 40 U/L 19 19     Alkaline Phosphatase      39 - 117 U/L 75 70     Total Bilirubin      0.0 - 1.2 mg/dL 0.39 0.40     eGFR Non African Am      >60 mL/min/1.73       Globulin      gm/dL 2.3 2.5     A/G Ratio      g/dL 1.9 1.6     BUN/Creatinine Ratio      7.0 - 25.0 20 12 14 17   Anion Gap       "5.0 - 15.0 mmol/L 17 17 20 (H) 12     Component      Latest Ref Rng & Units 1/25/2021 6/23/2021 9/14/2021           8:12 AM     Glucose      65 - 99 mg/dL 120 (H) 99 111 (H)   BUN      8 - 23 mg/dL 25 23 19   Creatinine      0.76 - 1.27 mg/dL 1.39 (H) 1.48 (H) 1.62 (H)   Sodium      136 - 145 mmol/L 137 138 139   Potassium      3.5 - 5.2 mmol/L 4.3 4.7 4.5   Chloride      98 - 107 mmol/L 100 103 102   CO2      22.0 - 29.0 mmol/L  31.2 (H) 25.0   Calcium      8.6 - 10.5 mg/dL 9.3 9.3 9.1   Total Protein      6.0 - 8.5 g/dL 6.4 6.1 6.7   Albumin      3.50 - 5.20 g/dL 3.8 4.20 4.30   ALT (SGPT)      1 - 41 U/L 16 17 18   AST (SGOT)      1 - 40 U/L 18 19 18   Alkaline Phosphatase      39 - 117 U/L 91 73 82   Total Bilirubin      0.0 - 1.2 mg/dL 0.35 0.4 0.6   eGFR Non African Am      >60 mL/min/1.73  46 (L) 41 (L)   Globulin      gm/dL 2.6 1.9 2.4   A/G Ratio      g/dL 1.5 2.2 1.8   BUN/Creatinine Ratio      7.0 - 25.0 18 15.5 11.7   Anion Gap      5.0 - 15.0 mmol/L 14 3.8 (L) 12.0       Objective   Vital Signs:   /95 (BP Location: Left arm, Patient Position: Sitting, Cuff Size: Large Adult)   Pulse 63   Temp 98.2 °F (36.8 °C)   Ht 182.9 cm (72\")   Wt 111 kg (244 lb)   SpO2 95%   BMI 33.09 kg/m²       Physical Exam :  Blood Pressure: 144/84, left arm, manual cuff  General: Well nourished, well hydrated, in no acute distress  HEENT: Conjunctiva clear, no exudate bilateral  Neck: Supple, non-tender, no adenopathy, no bruit  Skin: Warm and dry  Cardiovascular: S1 S2, regular rate and rhythm, no murmur  Respiratory: Clear to auscultation bilateral, no wheezes, rhonchi, or rales  Chest: Normal shape, no deformity, normal work of breathing  Extremities: Trace pitting lower extremity edema  Neurological: Alert, conversing normally  Psychological: Good eye contact, mood good, and judgment intact        Assessment and Plan:  Diagnoses and all orders for this visit:    1. Essential hypertension " (Primary)  Comments:  Stable to continue current medications.  Orders:  -     Comprehensive Metabolic Panel; Future  -     CBC & Differential; Future  -     irbesartan (AVAPRO) 300 MG tablet; Take 1 tablet by mouth Every Night for 90 days.  Dispense: 30 tablet; Refill: 1    2. Hyperlipidemia, unspecified hyperlipidemia type  Comments:  Stable on medication and to continue current treatment plan.  Orders:  -     Lipid Panel; Future    3. Hypothyroidism, unspecified type  Comments:  Stable and to continue current medication management.  Orders:  -     T4, Free; Future  -     TSH; Future    4. Stage 3b chronic kidney disease (CMS/[facility])  Comments:  Discussed lab results with patient creatinine 1.62 and GFR 41.  He follows with Dr. Higgins and will discuss with him at his next appointment.    5. Impaired fasting glucose  Comments:  Stable and will monitor.  Orders:  -     Hemoglobin A1c; Future    6. Vitamin B 12 deficiency  Comments:  Stable and to continue current medication management and will monitor.  Orders:  -     Vitamin B12; Future  -     Folate; Future    7. Vitamin D deficiency  Comments:  Stable and to continue supplementation; will monitor.  Orders:  -     Vitamin D 25 Hydroxy; Future        Follow Up:  Patient was given instructions and counseling regarding condition. Return in about 6 months (around 3/20/2022) for Recheck.

## 2021-09-21 ENCOUNTER — APPOINTMENT (OUTPATIENT)
Dept: CARDIAC REHAB | Facility: HOSPITAL | Age: 81
End: 2021-09-21

## 2021-09-22 ENCOUNTER — TELEPHONE (OUTPATIENT)
Dept: UROLOGY | Facility: CLINIC | Age: 81
End: 2021-09-22

## 2021-09-22 ENCOUNTER — APPOINTMENT (OUTPATIENT)
Dept: CARDIAC REHAB | Facility: HOSPITAL | Age: 81
End: 2021-09-22

## 2021-09-22 NOTE — TELEPHONE ENCOUNTER
CALLED PT AGAIN 9/22 NO ANSWER LEFT ANOTHER MESSAGE ON HIS MACHINE TO SCHEDULE PT F/U IN 1 MONTH WITH DR MARSHALL.

## 2021-09-23 ENCOUNTER — APPOINTMENT (OUTPATIENT)
Dept: CARDIAC REHAB | Facility: HOSPITAL | Age: 81
End: 2021-09-23

## 2021-09-27 ENCOUNTER — APPOINTMENT (OUTPATIENT)
Dept: CARDIAC REHAB | Facility: HOSPITAL | Age: 81
End: 2021-09-27

## 2021-09-28 ENCOUNTER — APPOINTMENT (OUTPATIENT)
Dept: CARDIAC REHAB | Facility: HOSPITAL | Age: 81
End: 2021-09-28

## 2021-09-29 ENCOUNTER — APPOINTMENT (OUTPATIENT)
Dept: CARDIAC REHAB | Facility: HOSPITAL | Age: 81
End: 2021-09-29

## 2021-09-30 ENCOUNTER — APPOINTMENT (OUTPATIENT)
Dept: CARDIAC REHAB | Facility: HOSPITAL | Age: 81
End: 2021-09-30

## 2021-10-04 ENCOUNTER — APPOINTMENT (OUTPATIENT)
Dept: CARDIAC REHAB | Facility: HOSPITAL | Age: 81
End: 2021-10-04

## 2021-10-05 ENCOUNTER — APPOINTMENT (OUTPATIENT)
Dept: CARDIAC REHAB | Facility: HOSPITAL | Age: 81
End: 2021-10-05

## 2021-10-06 ENCOUNTER — APPOINTMENT (OUTPATIENT)
Dept: CARDIAC REHAB | Facility: HOSPITAL | Age: 81
End: 2021-10-06

## 2021-10-07 ENCOUNTER — APPOINTMENT (OUTPATIENT)
Dept: CARDIAC REHAB | Facility: HOSPITAL | Age: 81
End: 2021-10-07

## 2021-10-11 ENCOUNTER — APPOINTMENT (OUTPATIENT)
Dept: CARDIAC REHAB | Facility: HOSPITAL | Age: 81
End: 2021-10-11

## 2021-10-12 ENCOUNTER — APPOINTMENT (OUTPATIENT)
Dept: CARDIAC REHAB | Facility: HOSPITAL | Age: 81
End: 2021-10-12

## 2021-10-13 ENCOUNTER — APPOINTMENT (OUTPATIENT)
Dept: CARDIAC REHAB | Facility: HOSPITAL | Age: 81
End: 2021-10-13

## 2021-10-14 ENCOUNTER — APPOINTMENT (OUTPATIENT)
Dept: CARDIAC REHAB | Facility: HOSPITAL | Age: 81
End: 2021-10-14

## 2021-10-18 ENCOUNTER — APPOINTMENT (OUTPATIENT)
Dept: CARDIAC REHAB | Facility: HOSPITAL | Age: 81
End: 2021-10-18

## 2021-10-19 ENCOUNTER — APPOINTMENT (OUTPATIENT)
Dept: CARDIAC REHAB | Facility: HOSPITAL | Age: 81
End: 2021-10-19

## 2021-10-20 ENCOUNTER — APPOINTMENT (OUTPATIENT)
Dept: CARDIAC REHAB | Facility: HOSPITAL | Age: 81
End: 2021-10-20

## 2021-10-20 ENCOUNTER — TRANSCRIBE ORDERS (OUTPATIENT)
Dept: UROLOGY | Facility: CLINIC | Age: 81
End: 2021-10-20

## 2021-10-20 ENCOUNTER — LAB (OUTPATIENT)
Dept: LAB | Facility: HOSPITAL | Age: 81
End: 2021-10-20

## 2021-10-20 DIAGNOSIS — Z85.46 PERSONAL HISTORY OF PROSTATE CANCER: Primary | ICD-10-CM

## 2021-10-20 DIAGNOSIS — C61 MALIGNANT NEOPLASM OF PROSTATE (HCC): ICD-10-CM

## 2021-10-20 DIAGNOSIS — Z85.46 PERSONAL HISTORY OF PROSTATE CANCER: ICD-10-CM

## 2021-10-20 LAB — PSA SERPL-MCNC: 0.25 NG/ML (ref 0–4)

## 2021-10-20 PROCEDURE — 36415 COLL VENOUS BLD VENIPUNCTURE: CPT

## 2021-10-20 PROCEDURE — 84153 ASSAY OF PSA TOTAL: CPT

## 2021-10-20 PROCEDURE — G0103 PSA SCREENING: HCPCS

## 2021-10-21 ENCOUNTER — APPOINTMENT (OUTPATIENT)
Dept: CARDIAC REHAB | Facility: HOSPITAL | Age: 81
End: 2021-10-21

## 2021-10-22 PROBLEM — N39.41 URGENCY INCONTINENCE: Status: ACTIVE | Noted: 2021-10-22

## 2021-10-22 NOTE — PROGRESS NOTES
Chief Complaint    Urologic complaint    Subjective          Michael Sol presents to [facility] UROLOGY  History of Present Illness       81-year-old  gentleman history of prostate cancer status post brachytherapy in 11/13.   with incontinence and nocturia.    Also with erythematous area on the glans penis that has been there for a long time.    Area of erythema has resolved.  This resolved with triamcinolone/nystatin cream.  Not bothersome not there any longer.     nocturia 0-4.  Patient does have a little bit of hard start of urination in the morning.  With some urgency.  Voids a large amount.  About the same.    No gross hematuria/burning or dysuria.    Pt with incontience.  Unchanged. 4-5 pads daily.     Patient with slow stream first morning void, otherwise he feels like he does fine.    PVR     9/19  64    Previous    Myrbetriq 50 mg - did not make any difference.    No history of kidney stone.    Status post coronary stent 2007.  Patient does not smoke.  81 aspirin daily    History of gross hematuria    6/15 cystoscopy - negative  6/15 CT urogram - punctate nonobstructive tract was on the left.  Negative otherwise.     Prostate CA    10/21  0.24  10/20  0.19  3/19  0.26  1/17  0.52  4/16  0.9  9/15  0.71  8/14  1.47    12/13  brachytherapy  11/13 prostate biopsy  Right3+3, 2/6, 5%  Left3+3, 1/6, 5%  2/13  4.3            Past History:  Medical History: has a past medical history of Benign prostatic hyperplasia without lower urinary tract symptoms, Chronic kidney disease, stage 3 (CMS/HCC), Essential (primary) hypertension, Hyperlipidemia, unspecified, Hypothyroidism, unspecified, and Vitamin D deficiency, unspecified.   Surgical History: has no past surgical history on file.   Family History: family history is not on file.   Social History: reports that he has never smoked. He does not have any smokeless tobacco history on file.  Allergies: Lisinopril       Current Outpatient  Medications:   •  amitriptyline (ELAVIL) 75 MG tablet, Take 75 mg by mouth Every Night., Disp: , Rfl:   •  amLODIPine (NORVASC) 5 MG tablet, Take 5 mg by mouth Daily., Disp: , Rfl:   •  aspirin 81 MG chewable tablet, Chew 81 mg Daily., Disp: , Rfl:   •  atorvastatin (LIPITOR) 20 MG tablet, Take 20 mg by mouth Daily., Disp: , Rfl:   •  Cholecalciferol 100 MCG (4000 UT) tablet, Take  by mouth., Disp: , Rfl:   •  cyanocobalamin 1000 MCG/ML injection, Inject 1,000 mcg into the appropriate muscle as directed by prescriber Every 28 (Twenty-Eight) Days., Disp: , Rfl:   •  irbesartan (AVAPRO) 300 MG tablet, TAKE 1 TABLET BY MOUTH EVERY NIGHT, Disp: 90 tablet, Rfl: 1  •  levothyroxine (SYNTHROID, LEVOTHROID) 50 MCG tablet, Take 50 mcg by mouth Daily., Disp: , Rfl:   •  sotalol (BETAPACE) 40 MG half tablet, Take 40 mg by mouth., Disp: , Rfl:      Physical exam       Alert and orient x3  Well appearing, well developed, in no acute distress   Unlabored respirations      Grossly oriented to person, place and time, judgment is intact, normal mood and affect    Results for orders placed or performed in visit on 10/20/21   PSA Screen    Specimen: Blood   Result Value Ref Range    PSA 0.246 0.000 - 4.000 ng/mL        Objective     Vital Signs:   There were no vitals taken for this visit.             Assessment and Plan    Diagnoses and all orders for this visit:    1. History of prostate cancer (Primary)    2. Urgency incontinence      Glans erythema resolved.  Patient given reassurance    Urination not bothersome.  A little slower in the morning.  I did again discussed we could try Flomax to see if that would give him any relief at all.  Not interested in medication at this time.    Follow-up in 1 year with a PSA before     PVR at follow-up

## 2021-10-25 ENCOUNTER — APPOINTMENT (OUTPATIENT)
Dept: CARDIAC REHAB | Facility: HOSPITAL | Age: 81
End: 2021-10-25

## 2021-10-25 ENCOUNTER — OFFICE VISIT (OUTPATIENT)
Dept: UROLOGY | Facility: CLINIC | Age: 81
End: 2021-10-25

## 2021-10-25 VITALS — BODY MASS INDEX: 33.35 KG/M2 | RESPIRATION RATE: 18 BRPM | WEIGHT: 246.2 LBS | HEIGHT: 72 IN

## 2021-10-25 DIAGNOSIS — N39.41 URGENCY INCONTINENCE: ICD-10-CM

## 2021-10-25 DIAGNOSIS — Z12.5 ENCOUNTER FOR SCREENING FOR MALIGNANT NEOPLASM OF PROSTATE: ICD-10-CM

## 2021-10-25 DIAGNOSIS — Z85.46 HISTORY OF PROSTATE CANCER: Primary | ICD-10-CM

## 2021-10-25 PROCEDURE — 99212 OFFICE O/P EST SF 10 MIN: CPT | Performed by: UROLOGY

## 2021-10-25 RX ORDER — ALBUTEROL SULFATE 90 UG/1
2 AEROSOL, METERED RESPIRATORY (INHALATION)
COMMUNITY
End: 2021-10-25 | Stop reason: ALTCHOICE

## 2021-10-25 RX ORDER — FUROSEMIDE 20 MG/1
20 TABLET ORAL DAILY
COMMUNITY
Start: 2021-03-10 | End: 2022-08-29 | Stop reason: SDUPTHER

## 2021-10-25 RX ORDER — ICOSAPENT ETHYL 1000 MG/1
2 CAPSULE ORAL 2 TIMES DAILY
COMMUNITY
Start: 2021-07-25

## 2021-10-26 ENCOUNTER — APPOINTMENT (OUTPATIENT)
Dept: CARDIAC REHAB | Facility: HOSPITAL | Age: 81
End: 2021-10-26

## 2021-10-27 ENCOUNTER — APPOINTMENT (OUTPATIENT)
Dept: CARDIAC REHAB | Facility: HOSPITAL | Age: 81
End: 2021-10-27

## 2021-10-28 ENCOUNTER — APPOINTMENT (OUTPATIENT)
Dept: CARDIAC REHAB | Facility: HOSPITAL | Age: 81
End: 2021-10-28

## 2021-11-01 ENCOUNTER — APPOINTMENT (OUTPATIENT)
Dept: CARDIAC REHAB | Facility: HOSPITAL | Age: 81
End: 2021-11-01

## 2021-11-02 ENCOUNTER — APPOINTMENT (OUTPATIENT)
Dept: CARDIAC REHAB | Facility: HOSPITAL | Age: 81
End: 2021-11-02

## 2021-11-03 ENCOUNTER — APPOINTMENT (OUTPATIENT)
Dept: CARDIAC REHAB | Facility: HOSPITAL | Age: 81
End: 2021-11-03

## 2021-11-04 ENCOUNTER — APPOINTMENT (OUTPATIENT)
Dept: CARDIAC REHAB | Facility: HOSPITAL | Age: 81
End: 2021-11-04

## 2021-11-08 ENCOUNTER — APPOINTMENT (OUTPATIENT)
Dept: CARDIAC REHAB | Facility: HOSPITAL | Age: 81
End: 2021-11-08

## 2021-11-09 ENCOUNTER — APPOINTMENT (OUTPATIENT)
Dept: CARDIAC REHAB | Facility: HOSPITAL | Age: 81
End: 2021-11-09

## 2021-11-10 ENCOUNTER — APPOINTMENT (OUTPATIENT)
Dept: CARDIAC REHAB | Facility: HOSPITAL | Age: 81
End: 2021-11-10

## 2021-11-11 ENCOUNTER — APPOINTMENT (OUTPATIENT)
Dept: CARDIAC REHAB | Facility: HOSPITAL | Age: 81
End: 2021-11-11

## 2021-11-15 ENCOUNTER — APPOINTMENT (OUTPATIENT)
Dept: CARDIAC REHAB | Facility: HOSPITAL | Age: 81
End: 2021-11-15

## 2021-11-16 ENCOUNTER — APPOINTMENT (OUTPATIENT)
Dept: CARDIAC REHAB | Facility: HOSPITAL | Age: 81
End: 2021-11-16

## 2021-11-17 ENCOUNTER — APPOINTMENT (OUTPATIENT)
Dept: CARDIAC REHAB | Facility: HOSPITAL | Age: 81
End: 2021-11-17

## 2021-11-18 ENCOUNTER — APPOINTMENT (OUTPATIENT)
Dept: CARDIAC REHAB | Facility: HOSPITAL | Age: 81
End: 2021-11-18

## 2021-11-22 ENCOUNTER — APPOINTMENT (OUTPATIENT)
Dept: CARDIAC REHAB | Facility: HOSPITAL | Age: 81
End: 2021-11-22

## 2021-11-23 ENCOUNTER — APPOINTMENT (OUTPATIENT)
Dept: CARDIAC REHAB | Facility: HOSPITAL | Age: 81
End: 2021-11-23

## 2021-11-24 ENCOUNTER — APPOINTMENT (OUTPATIENT)
Dept: CARDIAC REHAB | Facility: HOSPITAL | Age: 81
End: 2021-11-24

## 2021-11-25 ENCOUNTER — APPOINTMENT (OUTPATIENT)
Dept: CARDIAC REHAB | Facility: HOSPITAL | Age: 81
End: 2021-11-25

## 2021-11-29 ENCOUNTER — APPOINTMENT (OUTPATIENT)
Dept: CARDIAC REHAB | Facility: HOSPITAL | Age: 81
End: 2021-11-29

## 2021-11-29 ENCOUNTER — TRANSCRIBE ORDERS (OUTPATIENT)
Dept: LAB | Facility: HOSPITAL | Age: 81
End: 2021-11-29

## 2021-11-29 ENCOUNTER — LAB (OUTPATIENT)
Dept: LAB | Facility: HOSPITAL | Age: 81
End: 2021-11-29

## 2021-11-29 DIAGNOSIS — E78.5 HYPERLIPIDEMIA, UNSPECIFIED HYPERLIPIDEMIA TYPE: ICD-10-CM

## 2021-11-29 DIAGNOSIS — I10 ESSENTIAL HYPERTENSION, MALIGNANT: ICD-10-CM

## 2021-11-29 DIAGNOSIS — I25.10 DISEASE OF CARDIOVASCULAR SYSTEM: ICD-10-CM

## 2021-11-29 DIAGNOSIS — I25.10 DISEASE OF CARDIOVASCULAR SYSTEM: Primary | ICD-10-CM

## 2021-11-29 DIAGNOSIS — E03.9 HYPOTHYROIDISM, UNSPECIFIED TYPE: ICD-10-CM

## 2021-11-29 DIAGNOSIS — Z79.899 ENCOUNTER FOR LONG-TERM (CURRENT) USE OF OTHER MEDICATIONS: ICD-10-CM

## 2021-11-29 LAB
ALBUMIN SERPL-MCNC: 4.5 G/DL (ref 3.5–5.2)
ALBUMIN/GLOB SERPL: 1.9 G/DL
ALP SERPL-CCNC: 79 U/L (ref 39–117)
ALT SERPL W P-5'-P-CCNC: 18 U/L (ref 1–41)
ANION GAP SERPL CALCULATED.3IONS-SCNC: 8.2 MMOL/L (ref 5–15)
AST SERPL-CCNC: 21 U/L (ref 1–40)
BILIRUB SERPL-MCNC: 0.5 MG/DL (ref 0–1.2)
BUN SERPL-MCNC: 27 MG/DL (ref 8–23)
BUN/CREAT SERPL: 17.4 (ref 7–25)
CALCIUM SPEC-SCNC: 9.4 MG/DL (ref 8.6–10.5)
CHLORIDE SERPL-SCNC: 105 MMOL/L (ref 98–107)
CHOLEST SERPL-MCNC: 163 MG/DL (ref 0–200)
CO2 SERPL-SCNC: 28.8 MMOL/L (ref 22–29)
CREAT SERPL-MCNC: 1.55 MG/DL (ref 0.76–1.27)
GFR SERPL CREATININE-BSD FRML MDRD: 43 ML/MIN/1.73
GLOBULIN UR ELPH-MCNC: 2.4 GM/DL
GLUCOSE SERPL-MCNC: 92 MG/DL (ref 65–99)
HDLC SERPL-MCNC: 41 MG/DL (ref 40–60)
LDLC SERPL CALC-MCNC: 85 MG/DL (ref 0–100)
LDLC/HDLC SERPL: 1.91 {RATIO}
POTASSIUM SERPL-SCNC: 4.7 MMOL/L (ref 3.5–5.2)
PROT SERPL-MCNC: 6.9 G/DL (ref 6–8.5)
SODIUM SERPL-SCNC: 142 MMOL/L (ref 136–145)
T4 FREE SERPL-MCNC: 1.39 NG/DL (ref 0.93–1.7)
TRIGL SERPL-MCNC: 219 MG/DL (ref 0–150)
TSH SERPL DL<=0.05 MIU/L-ACNC: 2.3 UIU/ML (ref 0.27–4.2)
VLDLC SERPL-MCNC: 37 MG/DL (ref 5–40)

## 2021-11-29 PROCEDURE — 84439 ASSAY OF FREE THYROXINE: CPT

## 2021-11-29 PROCEDURE — 80053 COMPREHEN METABOLIC PANEL: CPT

## 2021-11-29 PROCEDURE — 84443 ASSAY THYROID STIM HORMONE: CPT

## 2021-11-29 PROCEDURE — 80061 LIPID PANEL: CPT

## 2021-11-29 PROCEDURE — 36415 COLL VENOUS BLD VENIPUNCTURE: CPT

## 2021-11-30 ENCOUNTER — APPOINTMENT (OUTPATIENT)
Dept: CARDIAC REHAB | Facility: HOSPITAL | Age: 81
End: 2021-11-30

## 2021-12-01 ENCOUNTER — APPOINTMENT (OUTPATIENT)
Dept: CARDIAC REHAB | Facility: HOSPITAL | Age: 81
End: 2021-12-01

## 2021-12-01 ENCOUNTER — OFFICE VISIT (OUTPATIENT)
Dept: CARDIOLOGY | Facility: CLINIC | Age: 81
End: 2021-12-01

## 2021-12-01 VITALS
HEART RATE: 58 BPM | HEIGHT: 72 IN | DIASTOLIC BLOOD PRESSURE: 88 MMHG | SYSTOLIC BLOOD PRESSURE: 162 MMHG | BODY MASS INDEX: 33.18 KG/M2 | WEIGHT: 245 LBS

## 2021-12-01 DIAGNOSIS — I10 ESSENTIAL HYPERTENSION: Chronic | ICD-10-CM

## 2021-12-01 DIAGNOSIS — E78.2 MIXED HYPERLIPIDEMIA: ICD-10-CM

## 2021-12-01 DIAGNOSIS — I48.0 PAROXYSMAL ATRIAL FIBRILLATION (HCC): ICD-10-CM

## 2021-12-01 DIAGNOSIS — I10 PRIMARY HYPERTENSION: ICD-10-CM

## 2021-12-01 DIAGNOSIS — I25.10 CORONARY ARTERY DISEASE INVOLVING NATIVE CORONARY ARTERY OF NATIVE HEART WITHOUT ANGINA PECTORIS: Primary | ICD-10-CM

## 2021-12-01 PROCEDURE — 99214 OFFICE O/P EST MOD 30 MIN: CPT | Performed by: INTERNAL MEDICINE

## 2021-12-01 PROCEDURE — 93000 ELECTROCARDIOGRAM COMPLETE: CPT | Performed by: INTERNAL MEDICINE

## 2021-12-01 RX ORDER — IRBESARTAN 300 MG/1
300 TABLET ORAL NIGHTLY
Qty: 90 TABLET | Refills: 3 | Status: SHIPPED | OUTPATIENT
Start: 2021-12-01 | End: 2023-01-16

## 2021-12-01 NOTE — PROGRESS NOTES
Office Visit    Chief Complaint  Hypertension, Hyperlipidemia, Coronary Artery Disease, and Atrial Fibrillation    Subjective            Michael Sol presents to [facility] CARDIOLOGY  Mr. Sol is a 81 years old gentleman with coronary disease hypertension hyperlipidemia paroxysmal atrial fibrillation was not had an episode of A. fib in several years.  He denies chest pain shortness of breath palpitations dizziness or syncope.  He is maintain a blood pressure log at home but forgot to bring it in.  He is also unsure about his medications and did not bring his medication bottles.  I have asked him to after examining him to bring his bottles and his blood pressure log and is going to bring it in tomorrow      Past Medical History:   Diagnosis Date   • Atrial fibrillation (HCC)    • Benign prostatic hyperplasia without lower urinary tract symptoms    • Chronic kidney disease, stage 3 (HCC)    • Essential (primary) hypertension    • Hyperlipidemia, unspecified    • Hypothyroidism, unspecified    • Myocardial infarction (HCC)    • Vitamin D deficiency, unspecified        Allergies   Allergen Reactions   • Lisinopril Cough        Past Surgical History:   Procedure Laterality Date   • CARDIAC CATHETERIZATION     • CORONARY STENT PLACEMENT          Social History     Tobacco Use   • Smoking status: Former Smoker   • Smokeless tobacco: Never Used   Vaping Use   • Vaping Use: Never used   Substance Use Topics   • Alcohol use: Yes   • Drug use: Never       History reviewed. No pertinent family history.     Prior to Admission medications    Medication Sig Start Date End Date Taking? Authorizing Provider   amitriptyline (ELAVIL) 75 MG tablet Take 37.5 mg by mouth Every Night.   Yes ProviderKourtney MD   amLODIPine (NORVASC) 10 MG tablet Take 10 mg by mouth Daily.   Yes ProviderKourtney MD   aspirin 81 MG chewable tablet Chew 81 mg Daily.   Yes Kourtney Major MD   atorvastatin  "(LIPITOR) 20 MG tablet Take 20 mg by mouth Daily.   Yes oKurtney Major MD   Cholecalciferol 100 MCG (4000 UT) tablet Take  by mouth.   Yes Kourtney Major MD   cyanocobalamin 1000 MCG/ML injection Inject 1,000 mcg into the appropriate muscle as directed by prescriber Every 28 (Twenty-Eight) Days.   Yes Kourtney Major MD   furosemide (LASIX) 20 MG tablet FUROSEMIDE 20MG TABLETS TAKE 1 TABLET(20 MG) BY MOUTH EVERY DAY 3/10/2021  Active 3/10/21  Yes Kourtney Major MD   irbesartan (AVAPRO) 300 MG tablet TAKE 1 TABLET BY MOUTH EVERY NIGHT 9/20/21  Yes Chichi Ashford APRN   levothyroxine (SYNTHROID, LEVOTHROID) 50 MCG tablet Take 50 mcg by mouth Daily.   Yes Kourtney Major MD   sotalol (BETAPACE) 40 MG half tablet Take 20 mg by mouth 2 (Two) Times a Day.   Yes Kourtney Major MD   Vascepa 1 g capsule capsule Take 2 capsules by mouth 2 (Two) Times a Day. 7/25/21   Kourtney Major MD        Review of Systems   Constitutional: Negative for fatigue.   Respiratory: Negative for cough and shortness of breath.    Cardiovascular: Positive for leg swelling. Negative for chest pain and palpitations.   Neurological: Negative for dizziness.        Objective     /88 (BP Location: Left arm, Patient Position: Standing, Cuff Size: Adult)   Pulse 58   Ht 182.9 cm (72\")   Wt 111 kg (245 lb)   BMI 33.23 kg/m²       Physical Exam  Constitutional:       General: He is awake.      Appearance: Normal appearance.   Neck:      Thyroid: No thyromegaly.      Vascular: No carotid bruit or JVD.   Cardiovascular:      Rate and Rhythm: Normal rate and regular rhythm.      Chest Wall: PMI is not displaced.      Pulses: Normal pulses.      Heart sounds: Normal heart sounds, S1 normal and S2 normal. No murmur heard.  No friction rub. No gallop. No S3 or S4 sounds.    Pulmonary:      Effort: Pulmonary effort is normal.      Breath sounds: Normal breath sounds and air entry. No wheezing, rhonchi or " rales.   Abdominal:      General: Bowel sounds are normal.      Palpations: Abdomen is soft. There is no mass.      Tenderness: There is no abdominal tenderness.   Musculoskeletal:      Cervical back: Neck supple.      Right lower leg: No edema.      Left lower leg: No edema.   Neurological:      Mental Status: He is alert and oriented to person, place, and time.   Psychiatric:         Mood and Affect: Mood normal.         Behavior: Behavior is cooperative.           Result Review :                    ECG 12 Lead    Date/Time: 12/1/2021 8:58 AM  Performed by: Jere Brar MD  Authorized by: Jere Brar MD   Comments: Sinus rhythm normal axis normal QTC.  No significant change compared to previous EKG.           Lab Results   Component Value Date    PROBNP 30.7 09/14/2021    PROBNP 17.7 06/23/2021     CMP    CMP 6/23/21 9/14/21 11/29/21   Glucose 99 111 (A) 92   BUN 23 19 27 (A)   Creatinine 1.48 (A) 1.62 (A) 1.55 (A)   eGFR Non  Am 46 (A) 41 (A) 43 (A)   Sodium 138 139 142   Potassium 4.7 4.5 4.7   Chloride 103 102 105   Calcium 9.3 9.1 9.4   Albumin 4.20 4.30 4.50   Total Bilirubin 0.4 0.6 0.5   Alkaline Phosphatase 73 82 79   AST (SGOT) 19 18 21   ALT (SGPT) 17 18 18   (A) Abnormal value            CBC w/diff    CBC w/Diff 1/25/21 6/23/21 9/14/21   WBC 4.52 (A) 4.84 5.44   RBC 4.08 (A) 4.39 4.72   Hemoglobin 12.6 (A) 13.7 14.9   Hematocrit 38.4 (A) 40.6 43.7   MCV 94.1 92.5 92.6   MCH 30.9 31.2 31.6   MCHC 32.8 (A) 33.7 34.1   RDW 14.1 13.3 13.6   Platelets 132 139 (A) 148   Neutrophil Rel % 44.2 57.5 55.8   Immature Granulocyte Rel %  0.2    Lymphocyte Rel % 33.0 28.5 27.2   Monocyte Rel % 10.2 (A) 9.7 8.5   Eosinophil Rel % 11.5 (A) 3.7 7.2 (A)   Basophil Rel % 0.7 0.4 0.9   (A) Abnormal value             Lipid Panel    Lipid Panel 1/25/21 6/23/21 11/29/21   Total Cholesterol  135 163   Total Cholesterol 144     Triglycerides 225 (A) 275 (A) 219 (A)   HDL Cholesterol 39 (A) 34 (A) 41   VLDL  Cholesterol 45 (A) 43 (A) 37   LDL Cholesterol  60 (A) 58 85   LDL/HDL Ratio  1.35 1.91   (A) Abnormal value       Comments are available for some flowsheets but are not being displayed.            Lab Results   Component Value Date    TSH 2.300 11/29/2021    TSH 3.440 09/14/2021    TSH 1.900 06/23/2021      Lab Results   Component Value Date    FREET4 1.39 11/29/2021    FREET4 1.20 06/23/2021    FREET4 1.2 01/25/2021      No results found for: DDIMERQUANT  No results found for: MG   No results found for: DIGOXIN   A1C Last 3 Results    HGBA1C Last 3 Results 1/25/21 6/23/21   Hemoglobin A1C 5.3 5.46      Comments are available for some flowsheets but are not being displayed.            Patient will bring his medicine bottles and his blood pressure log tomorrow and will advise him regarding changes in his medication that are warranted based on his blood pressure control and his hyperlipidemia         Assessment and Plan        Diagnoses and all orders for this visit:    1. Coronary artery disease involving native coronary artery of native heart without angina pectoris (Primary)  -     ECG 12 Lead  -     Lipid Panel; Future  -     Comprehensive Metabolic Panel; Future  -     Magnesium; Future  -     Lipid Panel; Future  -     Comprehensive Metabolic Panel; Future  -     Magnesium; Future    2. Essential hypertension  Comments:  Stable to continue current medications.  Orders:  -     irbesartan (AVAPRO) 300 MG tablet; Take 1 tablet by mouth Every Night.  Dispense: 90 tablet; Refill: 3  -     ECG 12 Lead  -     Lipid Panel; Future  -     Comprehensive Metabolic Panel; Future  -     Magnesium; Future  -     Lipid Panel; Future  -     Comprehensive Metabolic Panel; Future  -     Magnesium; Future    3. Mixed hyperlipidemia  Assessment & Plan:  Lipid abnormalities are not at goal.  His LDL is above target.  I have discussed with him a low-cholesterol diet.  In addition, I am going to increase it is atorvastatin to 80 mg at  bedtime and do lipid profile and a CMP in 3 months and office visit with blood work in 6 months    Orders:  -     ECG 12 Lead  -     Lipid Panel; Future  -     Comprehensive Metabolic Panel; Future  -     Magnesium; Future  -     Lipid Panel; Future  -     Comprehensive Metabolic Panel; Future  -     Magnesium; Future    4. Primary hypertension  -     ECG 12 Lead  -     Lipid Panel; Future  -     Comprehensive Metabolic Panel; Future  -     Magnesium; Future  -     Lipid Panel; Future  -     Comprehensive Metabolic Panel; Future  -     Magnesium; Future    5. Paroxysmal atrial fibrillation (HCC)    Other orders  -     atorvastatin (LIPITOR) 80 MG tablet; Take 1 tablet by mouth Daily.  Dispense: 90 tablet; Refill: 3          Follow Up     Return in about 6 months (around 6/1/2022) for EKG with next office visit.    Patient was given instructions and counseling regarding his condition or for health maintenance advice. Please see specific information pulled into the AVS if appropriate.     Jere Brar MD  12/02/21 08:15 EST

## 2021-12-02 ENCOUNTER — APPOINTMENT (OUTPATIENT)
Dept: CARDIAC REHAB | Facility: HOSPITAL | Age: 81
End: 2021-12-02

## 2021-12-02 RX ORDER — ATORVASTATIN CALCIUM 80 MG/1
80 TABLET, FILM COATED ORAL DAILY
Qty: 90 TABLET | Refills: 3 | Status: SHIPPED | OUTPATIENT
Start: 2021-12-02

## 2021-12-02 RX ORDER — SOTALOL HYDROCHLORIDE 80 MG/1
40 TABLET ORAL 2 TIMES DAILY
COMMUNITY

## 2021-12-02 NOTE — ASSESSMENT & PLAN NOTE
Lipid abnormalities are not at goal.  His LDL is above target.  I have discussed with him a low-cholesterol diet.  In addition, I am going to increase it is atorvastatin to 80 mg at bedtime and do lipid profile and a CMP in 3 months and office visit with blood work in 6 months

## 2021-12-06 ENCOUNTER — APPOINTMENT (OUTPATIENT)
Dept: CARDIAC REHAB | Facility: HOSPITAL | Age: 81
End: 2021-12-06

## 2021-12-07 ENCOUNTER — APPOINTMENT (OUTPATIENT)
Dept: CARDIAC REHAB | Facility: HOSPITAL | Age: 81
End: 2021-12-07

## 2021-12-08 ENCOUNTER — APPOINTMENT (OUTPATIENT)
Dept: CARDIAC REHAB | Facility: HOSPITAL | Age: 81
End: 2021-12-08

## 2021-12-09 ENCOUNTER — APPOINTMENT (OUTPATIENT)
Dept: CARDIAC REHAB | Facility: HOSPITAL | Age: 81
End: 2021-12-09

## 2021-12-13 ENCOUNTER — APPOINTMENT (OUTPATIENT)
Dept: CARDIAC REHAB | Facility: HOSPITAL | Age: 81
End: 2021-12-13

## 2021-12-14 ENCOUNTER — APPOINTMENT (OUTPATIENT)
Dept: CARDIAC REHAB | Facility: HOSPITAL | Age: 81
End: 2021-12-14

## 2021-12-15 ENCOUNTER — APPOINTMENT (OUTPATIENT)
Dept: CARDIAC REHAB | Facility: HOSPITAL | Age: 81
End: 2021-12-15

## 2021-12-16 ENCOUNTER — APPOINTMENT (OUTPATIENT)
Dept: CARDIAC REHAB | Facility: HOSPITAL | Age: 81
End: 2021-12-16

## 2021-12-20 ENCOUNTER — APPOINTMENT (OUTPATIENT)
Dept: CARDIAC REHAB | Facility: HOSPITAL | Age: 81
End: 2021-12-20

## 2021-12-21 ENCOUNTER — APPOINTMENT (OUTPATIENT)
Dept: CARDIAC REHAB | Facility: HOSPITAL | Age: 81
End: 2021-12-21

## 2021-12-22 ENCOUNTER — APPOINTMENT (OUTPATIENT)
Dept: CARDIAC REHAB | Facility: HOSPITAL | Age: 81
End: 2021-12-22

## 2021-12-23 ENCOUNTER — APPOINTMENT (OUTPATIENT)
Dept: CARDIAC REHAB | Facility: HOSPITAL | Age: 81
End: 2021-12-23

## 2021-12-27 ENCOUNTER — APPOINTMENT (OUTPATIENT)
Dept: CARDIAC REHAB | Facility: HOSPITAL | Age: 81
End: 2021-12-27

## 2021-12-28 ENCOUNTER — APPOINTMENT (OUTPATIENT)
Dept: CARDIAC REHAB | Facility: HOSPITAL | Age: 81
End: 2021-12-28

## 2021-12-29 ENCOUNTER — APPOINTMENT (OUTPATIENT)
Dept: CARDIAC REHAB | Facility: HOSPITAL | Age: 81
End: 2021-12-29

## 2021-12-30 ENCOUNTER — APPOINTMENT (OUTPATIENT)
Dept: CARDIAC REHAB | Facility: HOSPITAL | Age: 81
End: 2021-12-30

## 2022-01-25 ENCOUNTER — LAB (OUTPATIENT)
Dept: LAB | Facility: HOSPITAL | Age: 82
End: 2022-01-25

## 2022-01-25 ENCOUNTER — OFFICE VISIT (OUTPATIENT)
Dept: INTERNAL MEDICINE | Facility: CLINIC | Age: 82
End: 2022-01-25

## 2022-01-25 VITALS
TEMPERATURE: 97.9 F | OXYGEN SATURATION: 97 % | DIASTOLIC BLOOD PRESSURE: 90 MMHG | HEART RATE: 64 BPM | WEIGHT: 241 LBS | BODY MASS INDEX: 32.64 KG/M2 | SYSTOLIC BLOOD PRESSURE: 157 MMHG | HEIGHT: 72 IN

## 2022-01-25 DIAGNOSIS — I10 HYPERTENSION, ESSENTIAL: ICD-10-CM

## 2022-01-25 DIAGNOSIS — R10.84 ABDOMINAL PAIN, GENERALIZED: ICD-10-CM

## 2022-01-25 DIAGNOSIS — N18.32 STAGE 3B CHRONIC KIDNEY DISEASE: ICD-10-CM

## 2022-01-25 DIAGNOSIS — N18.32 STAGE 3B CHRONIC KIDNEY DISEASE: Primary | ICD-10-CM

## 2022-01-25 LAB
ALBUMIN SERPL-MCNC: 4.3 G/DL (ref 3.5–5.2)
ALBUMIN/GLOB SERPL: 1.7 G/DL
ALP SERPL-CCNC: 81 U/L (ref 39–117)
ALT SERPL W P-5'-P-CCNC: 21 U/L (ref 1–41)
AMYLASE SERPL-CCNC: 39 U/L (ref 28–100)
ANION GAP SERPL CALCULATED.3IONS-SCNC: 7.1 MMOL/L (ref 5–15)
AST SERPL-CCNC: 18 U/L (ref 1–40)
BASOPHILS # BLD AUTO: 0.05 10*3/MM3 (ref 0–0.2)
BASOPHILS NFR BLD AUTO: 0.8 % (ref 0–1.5)
BILIRUB SERPL-MCNC: 0.6 MG/DL (ref 0–1.2)
BUN SERPL-MCNC: 17 MG/DL (ref 8–23)
BUN/CREAT SERPL: 10.9 (ref 7–25)
CALCIUM SPEC-SCNC: 9.4 MG/DL (ref 8.6–10.5)
CHLORIDE SERPL-SCNC: 100 MMOL/L (ref 98–107)
CO2 SERPL-SCNC: 28.9 MMOL/L (ref 22–29)
CREAT SERPL-MCNC: 1.56 MG/DL (ref 0.76–1.27)
DEPRECATED RDW RBC AUTO: 42.2 FL (ref 37–54)
EOSINOPHIL # BLD AUTO: 0.22 10*3/MM3 (ref 0–0.4)
EOSINOPHIL NFR BLD AUTO: 3.7 % (ref 0.3–6.2)
ERYTHROCYTE [DISTWIDTH] IN BLOOD BY AUTOMATED COUNT: 12.9 % (ref 12.3–15.4)
GFR SERPL CREATININE-BSD FRML MDRD: 43 ML/MIN/1.73
GLOBULIN UR ELPH-MCNC: 2.6 GM/DL
GLUCOSE SERPL-MCNC: 113 MG/DL (ref 65–99)
HCT VFR BLD AUTO: 42.6 % (ref 37.5–51)
HGB BLD-MCNC: 14.6 G/DL (ref 13–17.7)
IMM GRANULOCYTES # BLD AUTO: 0.02 10*3/MM3 (ref 0–0.05)
IMM GRANULOCYTES NFR BLD AUTO: 0.3 % (ref 0–0.5)
LIPASE SERPL-CCNC: 44 U/L (ref 13–60)
LYMPHOCYTES # BLD AUTO: 1.86 10*3/MM3 (ref 0.7–3.1)
LYMPHOCYTES NFR BLD AUTO: 30.9 % (ref 19.6–45.3)
MCH RBC QN AUTO: 30.9 PG (ref 26.6–33)
MCHC RBC AUTO-ENTMCNC: 34.3 G/DL (ref 31.5–35.7)
MCV RBC AUTO: 90.1 FL (ref 79–97)
MONOCYTES # BLD AUTO: 0.56 10*3/MM3 (ref 0.1–0.9)
MONOCYTES NFR BLD AUTO: 9.3 % (ref 5–12)
NEUTROPHILS NFR BLD AUTO: 3.31 10*3/MM3 (ref 1.7–7)
NEUTROPHILS NFR BLD AUTO: 55 % (ref 42.7–76)
NRBC BLD AUTO-RTO: 0 /100 WBC (ref 0–0.2)
PLATELET # BLD AUTO: 138 10*3/MM3 (ref 140–450)
PMV BLD AUTO: 10.7 FL (ref 6–12)
POTASSIUM SERPL-SCNC: 3.9 MMOL/L (ref 3.5–5.2)
PROT SERPL-MCNC: 6.9 G/DL (ref 6–8.5)
RBC # BLD AUTO: 4.73 10*6/MM3 (ref 4.14–5.8)
SODIUM SERPL-SCNC: 136 MMOL/L (ref 136–145)
WBC NRBC COR # BLD: 6.02 10*3/MM3 (ref 3.4–10.8)

## 2022-01-25 PROCEDURE — 36415 COLL VENOUS BLD VENIPUNCTURE: CPT

## 2022-01-25 PROCEDURE — 85652 RBC SED RATE AUTOMATED: CPT

## 2022-01-25 PROCEDURE — 99214 OFFICE O/P EST MOD 30 MIN: CPT | Performed by: INTERNAL MEDICINE

## 2022-01-25 PROCEDURE — 83690 ASSAY OF LIPASE: CPT

## 2022-01-25 PROCEDURE — 80053 COMPREHEN METABOLIC PANEL: CPT

## 2022-01-25 PROCEDURE — 82150 ASSAY OF AMYLASE: CPT

## 2022-01-25 PROCEDURE — 85025 COMPLETE CBC W/AUTO DIFF WBC: CPT

## 2022-01-25 RX ORDER — PANTOPRAZOLE SODIUM 40 MG/1
40 TABLET, DELAYED RELEASE ORAL DAILY
Qty: 60 TABLET | Refills: 3 | Status: SHIPPED | OUTPATIENT
Start: 2022-01-25

## 2022-01-25 NOTE — PROGRESS NOTES
"Chief Complaint/ HPI: Complains of a mild constant pain across his upper abdomen abdominal area for the past couple weeks still eating okay no vomiting, trying to lose weight, no fevers chills, no diarrhea, no hemoptysis, no hematemesis,  Dr. Nguyen had texted today about possible follow-up also,    No falls, no trauma    Objective   Vital Signs  Vitals:    01/25/22 1321   BP: 157/90   Pulse: 64   Temp: 97.9 °F (36.6 °C)   SpO2: 97%   Weight: 109 kg (241 lb)   Height: 182.9 cm (72.01\")      Body mass index is 32.68 kg/m².  Review of Systems as above,  Physical Exam  Constitutional:       General: He is not in acute distress.     Appearance: Normal appearance.   HENT:      Head: Normocephalic.      Mouth/Throat:      Mouth: Mucous membranes are moist.   Eyes:      Conjunctiva/sclera: Conjunctivae normal.      Pupils: Pupils are equal, round, and reactive to light.   Cardiovascular:      Rate and Rhythm: Normal rate and regular rhythm.      Pulses: Normal pulses.      Heart sounds: Normal heart sounds.   Pulmonary:      Effort: Pulmonary effort is normal.      Breath sounds: Normal breath sounds.   Abdominal:      General: Abdomen is flat. Bowel sounds are normal.      Palpations: Abdomen is soft.   Musculoskeletal:         General: No swelling. Normal range of motion.      Cervical back: Neck supple.   Skin:     General: Skin is warm and dry.      Coloration: Skin is not jaundiced.   Neurological:      General: No focal deficit present.      Mental Status: He is alert and oriented to person, place, and time. Mental status is at baseline.   Psychiatric:         Mood and Affect: Mood normal.         Behavior: Behavior normal.         Thought Content: Thought content normal.         Judgment: Judgment normal.        Result Review :   Lab Results   Component Value Date    PROBNP 30.7 09/14/2021    PROBNP 17.7 06/23/2021     CMP    CMP 6/23/21 9/14/21 11/29/21   Glucose 99 111 (A) 92   BUN 23 19 27 (A)   Creatinine 1.48 " (A) 1.62 (A) 1.55 (A)   eGFR Non  Am 46 (A) 41 (A) 43 (A)   Sodium 138 139 142   Potassium 4.7 4.5 4.7   Chloride 103 102 105   Calcium 9.3 9.1 9.4   Albumin 4.20 4.30 4.50   Total Bilirubin 0.4 0.6 0.5   Alkaline Phosphatase 73 82 79   AST (SGOT) 19 18 21   ALT (SGPT) 17 18 18   (A) Abnormal value            CBC w/diff    CBC w/Diff 6/23/21 9/14/21   WBC 4.84 5.44   RBC 4.39 4.72   Hemoglobin 13.7 14.9   Hematocrit 40.6 43.7   MCV 92.5 92.6   MCH 31.2 31.6   MCHC 33.7 34.1   RDW 13.3 13.6   Platelets 139 (A) 148   Neutrophil Rel % 57.5 55.8   Immature Granulocyte Rel % 0.2    Lymphocyte Rel % 28.5 27.2   Monocyte Rel % 9.7 8.5   Eosinophil Rel % 3.7 7.2 (A)   Basophil Rel % 0.4 0.9   (A) Abnormal value             Lipid Panel    Lipid Panel 6/23/21 11/29/21   Total Cholesterol 135 163   Triglycerides 275 (A) 219 (A)   HDL Cholesterol 34 (A) 41   VLDL Cholesterol 43 (A) 37   LDL Cholesterol  58 85   LDL/HDL Ratio 1.35 1.91   (A) Abnormal value             Lab Results   Component Value Date    TSH 2.300 11/29/2021    TSH 3.440 09/14/2021    TSH 1.900 06/23/2021      Lab Results   Component Value Date    FREET4 1.39 11/29/2021    FREET4 1.20 06/23/2021    FREET4 1.2 01/25/2021      A1C Last 3 Results    HGBA1C Last 3 Results 6/23/21   Hemoglobin A1C 5.46            PSA    PSA 6/23/21 10/20/21   PSA 0.246 0.246                          Visit Diagnoses:    ICD-10-CM ICD-9-CM   1. Stage 3b chronic kidney disease (HCC)  N18.32 585.3   2. Hypertension, essential  I10 401.9   3. Abdominal pain, generalized  R10.84 789.07       Assessment and Plan   Diagnoses and all orders for this visit:    1. Stage 3b chronic kidney disease (HCC) (Primary)  -     CBC & Differential; Future  -     Comprehensive Metabolic Panel; Future  -     Sedimentation Rate; Future  -     Amylase; Future  -     Lipase; Future  -     CT Abdomen Pelvis With & Without Contrast; Future  -     Ambulatory Referral to Gastroenterology    2.  Hypertension, essential  -     CBC & Differential; Future  -     Comprehensive Metabolic Panel; Future  -     Sedimentation Rate; Future  -     Amylase; Future  -     Lipase; Future  -     CT Abdomen Pelvis With & Without Contrast; Future  -     Ambulatory Referral to Gastroenterology    3. Abdominal pain, generalized  -     CBC & Differential; Future  -     Comprehensive Metabolic Panel; Future  -     Sedimentation Rate; Future  -     Amylase; Future  -     Lipase; Future  -     CT Abdomen Pelvis With & Without Contrast; Future  -     Ambulatory Referral to Gastroenterology    Other orders  -     pantoprazole (Protonix) 40 MG EC tablet; Take 1 tablet by mouth Daily.  Dispense: 60 tablet; Refill: 3        Abdominal pain, diffuse mainly epigastric, etiology unclear, ongoing for the past 2 weeks January 25, 2022, plan is to check lab work amylase lipase CT scan abdomen and pelvis, consider upper endoscopy, will also start        Follow Up   Return in about 2 weeks (around 2/8/2022).  Patient was given instructions and counseling regarding his condition or for health maintenance advice. Please see specific information pulled into the AVS if appropriate.

## 2022-01-26 LAB — ERYTHROCYTE [SEDIMENTATION RATE] IN BLOOD: 2 MM/HR (ref 0–20)

## 2022-02-08 ENCOUNTER — HOSPITAL ENCOUNTER (OUTPATIENT)
Dept: CT IMAGING | Facility: HOSPITAL | Age: 82
Discharge: HOME | End: 2022-02-08
Admitting: INTERNAL MEDICINE

## 2022-02-08 DIAGNOSIS — R10.84 ABDOMINAL PAIN, GENERALIZED: ICD-10-CM

## 2022-02-08 DIAGNOSIS — N18.32 STAGE 3B CHRONIC KIDNEY DISEASE: ICD-10-CM

## 2022-02-08 DIAGNOSIS — I10 HYPERTENSION, ESSENTIAL: ICD-10-CM

## 2022-02-08 PROCEDURE — 0 IOPAMIDOL PER 1 ML: Performed by: INTERNAL MEDICINE

## 2022-02-08 PROCEDURE — 74178 CT ABD&PLV WO CNTR FLWD CNTR: CPT

## 2022-02-08 RX ADMIN — IOPAMIDOL 100 ML: 755 INJECTION, SOLUTION INTRAVENOUS at 09:22

## 2022-02-10 ENCOUNTER — OFFICE VISIT (OUTPATIENT)
Dept: INTERNAL MEDICINE | Facility: CLINIC | Age: 82
End: 2022-02-10

## 2022-02-10 VITALS
SYSTOLIC BLOOD PRESSURE: 154 MMHG | HEART RATE: 79 BPM | HEIGHT: 72 IN | WEIGHT: 245.8 LBS | BODY MASS INDEX: 33.29 KG/M2 | DIASTOLIC BLOOD PRESSURE: 90 MMHG | TEMPERATURE: 98.4 F | OXYGEN SATURATION: 96 %

## 2022-02-10 DIAGNOSIS — I48.0 PAROXYSMAL ATRIAL FIBRILLATION: ICD-10-CM

## 2022-02-10 DIAGNOSIS — I25.10 CORONARY ARTERY DISEASE INVOLVING NATIVE CORONARY ARTERY OF NATIVE HEART WITHOUT ANGINA PECTORIS: ICD-10-CM

## 2022-02-10 DIAGNOSIS — E03.9 HYPOTHYROIDISM, UNSPECIFIED TYPE: ICD-10-CM

## 2022-02-10 DIAGNOSIS — E53.8 VITAMIN B 12 DEFICIENCY: ICD-10-CM

## 2022-02-10 DIAGNOSIS — I10 HYPERTENSION, ESSENTIAL: ICD-10-CM

## 2022-02-10 DIAGNOSIS — K21.9 GASTROESOPHAGEAL REFLUX DISEASE WITHOUT ESOPHAGITIS: ICD-10-CM

## 2022-02-10 DIAGNOSIS — R10.84 ABDOMINAL PAIN, GENERALIZED: ICD-10-CM

## 2022-02-10 DIAGNOSIS — N18.32 STAGE 3B CHRONIC KIDNEY DISEASE: Primary | ICD-10-CM

## 2022-02-10 DIAGNOSIS — E78.2 MIXED HYPERLIPIDEMIA: ICD-10-CM

## 2022-02-10 DIAGNOSIS — E55.9 VITAMIN D DEFICIENCY: ICD-10-CM

## 2022-02-10 PROCEDURE — 99213 OFFICE O/P EST LOW 20 MIN: CPT | Performed by: INTERNAL MEDICINE

## 2022-02-10 RX ORDER — SPIRONOLACTONE 25 MG/1
25 TABLET ORAL
COMMUNITY
Start: 2022-01-31 | End: 2023-01-31

## 2022-02-10 NOTE — PROGRESS NOTES
"Chief Complaint/ HPI: Follow-up with abdominal pain, recent CT scan and lab work, from January 25, 2022, still having some abdominal pains discomfort at times,          Objective   Vital Signs  Vitals:    02/10/22 1251   BP: 154/90   Pulse: 79   Temp: 98.4 °F (36.9 °C)   SpO2: 96%   Weight: 111 kg (245 lb 12.8 oz)   Height: 182.9 cm (72.01\")      Body mass index is 33.33 kg/m².  Review of Systems   Physical Exam   Result Review :   Lab Results   Component Value Date    PROBNP 30.7 09/14/2021    PROBNP 17.7 06/23/2021     CMP    CMP 9/14/21 11/29/21 1/25/22   Glucose 111 (A) 92 113 (A)   BUN 19 27 (A) 17   Creatinine 1.62 (A) 1.55 (A) 1.56 (A)   eGFR Non  Am 41 (A) 43 (A) 43 (A)   Sodium 139 142 136   Potassium 4.5 4.7 3.9   Chloride 102 105 100   Calcium 9.1 9.4 9.4   Albumin 4.30 4.50 4.30   Total Bilirubin 0.6 0.5 0.6   Alkaline Phosphatase 82 79 81   AST (SGOT) 18 21 18   ALT (SGPT) 18 18 21   (A) Abnormal value            CBC w/diff    CBC w/Diff 6/23/21 9/14/21 1/25/22   WBC 4.84 5.44 6.02   RBC 4.39 4.72 4.73   Hemoglobin 13.7 14.9 14.6   Hematocrit 40.6 43.7 42.6   MCV 92.5 92.6 90.1   MCH 31.2 31.6 30.9   MCHC 33.7 34.1 34.3   RDW 13.3 13.6 12.9   Platelets 139 (A) 148 138 (A)   Neutrophil Rel % 57.5 55.8 55.0   Immature Granulocyte Rel % 0.2  0.3   Lymphocyte Rel % 28.5 27.2 30.9   Monocyte Rel % 9.7 8.5 9.3   Eosinophil Rel % 3.7 7.2 (A) 3.7   Basophil Rel % 0.4 0.9 0.8   (A) Abnormal value             Lipid Panel    Lipid Panel 6/23/21 11/29/21   Total Cholesterol 135 163   Triglycerides 275 (A) 219 (A)   HDL Cholesterol 34 (A) 41   VLDL Cholesterol 43 (A) 37   LDL Cholesterol  58 85   LDL/HDL Ratio 1.35 1.91   (A) Abnormal value             Lab Results   Component Value Date    TSH 2.300 11/29/2021    TSH 3.440 09/14/2021    TSH 1.900 06/23/2021      Lab Results   Component Value Date    FREET4 1.39 11/29/2021    FREET4 1.20 06/23/2021    FREET4 1.2 01/25/2021      A1C Last 3 Results    HGBA1C " Last 3 Results 6/23/21   Hemoglobin A1C 5.46            PSA    PSA 6/23/21 10/20/21   PSA 0.246 0.246                          Visit Diagnoses:    ICD-10-CM ICD-9-CM   1. Stage 3b chronic kidney disease (HCC)  N18.32 585.3   2. Hypertension, essential  I10 401.9   3. Mixed hyperlipidemia  E78.2 272.2   4. Hypothyroidism, unspecified type  E03.9 244.9   5. Vitamin D deficiency  E55.9 268.9   6. Gastroesophageal reflux disease without esophagitis  K21.9 530.81   7. Vitamin B 12 deficiency  E53.8 266.2   8. Paroxysmal atrial fibrillation (HCC)  I48.0 427.31   9. Coronary artery disease involving native coronary artery of native heart without angina pectoris  I25.10 414.01   10. Abdominal pain, generalized  R10.84 789.07       Assessment and Plan   Diagnoses and all orders for this visit:    1. Stage 3b chronic kidney disease (HCC) (Primary)  -     Lipid Panel; Future  -     Comprehensive Metabolic Panel; Future  -     CBC & Differential; Future  -     TSH+Free T4; Future  -     Vitamin D 25 Hydroxy; Future    2. Hypertension, essential  -     Lipid Panel; Future  -     Comprehensive Metabolic Panel; Future  -     CBC & Differential; Future  -     TSH+Free T4; Future  -     Vitamin D 25 Hydroxy; Future    3. Mixed hyperlipidemia  -     Lipid Panel; Future  -     Comprehensive Metabolic Panel; Future  -     CBC & Differential; Future  -     TSH+Free T4; Future  -     Vitamin D 25 Hydroxy; Future    4. Hypothyroidism, unspecified type  -     Lipid Panel; Future  -     Comprehensive Metabolic Panel; Future  -     CBC & Differential; Future  -     TSH+Free T4; Future  -     Vitamin D 25 Hydroxy; Future    5. Vitamin D deficiency  -     Lipid Panel; Future  -     Comprehensive Metabolic Panel; Future  -     CBC & Differential; Future  -     TSH+Free T4; Future  -     Vitamin D 25 Hydroxy; Future    6. Gastroesophageal reflux disease without esophagitis  -     Lipid Panel; Future  -     Comprehensive Metabolic Panel;  Future  -     CBC & Differential; Future  -     TSH+Free T4; Future  -     Vitamin D 25 Hydroxy; Future    7. Vitamin B 12 deficiency  -     Lipid Panel; Future  -     Comprehensive Metabolic Panel; Future  -     CBC & Differential; Future  -     TSH+Free T4; Future  -     Vitamin D 25 Hydroxy; Future    8. Paroxysmal atrial fibrillation (HCC)  -     Lipid Panel; Future  -     Comprehensive Metabolic Panel; Future  -     CBC & Differential; Future  -     TSH+Free T4; Future  -     Vitamin D 25 Hydroxy; Future    9. Coronary artery disease involving native coronary artery of native heart without angina pectoris  -     Lipid Panel; Future  -     Comprehensive Metabolic Panel; Future  -     CBC & Differential; Future  -     TSH+Free T4; Future  -     Vitamin D 25 Hydroxy; Future    10. Abdominal pain, generalized  -     Lipid Panel; Future  -     Comprehensive Metabolic Panel; Future  -     CBC & Differential; Future  -     TSH+Free T4; Future  -     Vitamin D 25 Hydroxy; Future         Abdominal pains, right flank pain, ---CT scan unremarkable February 2022, if symptoms do not improve in the next 4 to 6 weeks patient will call and we will get a colonoscopy set up with Dr. Nguyen, given the nature of his pain, otherwise we will continue to monitor clinically, with current work-up unremarkable,    Coronary artery disease previous stent placement 2007, continues aspirin,    Insomnia, continues on Elavil 75 mg tablet half tablet at bedtime,    Hypertension continues on Norvasc 10 mg daily, irbesartan 300 mg daily    Lower extremity edema continues Lasix 20 mg daily    Vitamin D and B12 deficiency continues replacement    Hyperlipidemia continues on Lipitor 80 mg daily,, Vascepa 1 g 2 tablets twice a day,    Hypothyroidism continues on levothyroxine 50 mcg daily    Paroxysmal atrial fibrillation, continues on sotalol 40 mg twice a day    Mild thrombocytopenia, chronic we will follow February 2022      Follow Up   Return  in about 6 months (around 8/10/2022).  Patient was given instructions and counseling regarding his condition or for health maintenance advice. Please see specific information pulled into the AVS if appropriate.

## 2022-02-16 ENCOUNTER — TELEPHONE (OUTPATIENT)
Dept: GASTROENTEROLOGY | Facility: CLINIC | Age: 82
End: 2022-02-16

## 2022-02-16 NOTE — TELEPHONE ENCOUNTER
I spoke to the patient who is having intermittent dull ache episodes.  The pain is periumbilical or slightly below the the umbilicus.  When it occurs it lasts for 2 to 3 hours.  He denies any diarrhea constipation or blood in stools.  Last colonoscopy reviewed from April 2019 showed diverticulosis and polyps.  Recent CT scan at the end of the January showed diverticulosis but no acute findings.  However therefore: Levsin sublingual tablets for him to try as needed.  If his symptoms worsen he will go to the ER.  He will also continue PPI therapy given to him by Dr. Salazar.

## 2022-02-21 ENCOUNTER — OFFICE VISIT (OUTPATIENT)
Dept: CARDIAC REHAB | Facility: HOSPITAL | Age: 82
End: 2022-02-21

## 2022-02-21 DIAGNOSIS — Z95.5 HISTORY OF HEART ARTERY STENT: Primary | ICD-10-CM

## 2022-02-24 ENCOUNTER — APPOINTMENT (OUTPATIENT)
Dept: CARDIAC REHAB | Facility: HOSPITAL | Age: 82
End: 2022-02-24

## 2022-02-24 VITALS
BODY MASS INDEX: 32.58 KG/M2 | DIASTOLIC BLOOD PRESSURE: 68 MMHG | OXYGEN SATURATION: 95 % | SYSTOLIC BLOOD PRESSURE: 130 MMHG | WEIGHT: 240.3 LBS | HEART RATE: 64 BPM

## 2022-02-28 ENCOUNTER — OFFICE VISIT (OUTPATIENT)
Dept: CARDIAC REHAB | Facility: HOSPITAL | Age: 82
End: 2022-02-28

## 2022-02-28 VITALS
BODY MASS INDEX: 33.03 KG/M2 | DIASTOLIC BLOOD PRESSURE: 72 MMHG | OXYGEN SATURATION: 98 % | SYSTOLIC BLOOD PRESSURE: 128 MMHG | HEART RATE: 76 BPM | WEIGHT: 243.61 LBS

## 2022-02-28 DIAGNOSIS — Z95.5 HISTORY OF HEART ARTERY STENT: Primary | ICD-10-CM

## 2022-03-03 ENCOUNTER — OFFICE VISIT (OUTPATIENT)
Dept: CARDIAC REHAB | Facility: HOSPITAL | Age: 82
End: 2022-03-03

## 2022-03-03 VITALS
DIASTOLIC BLOOD PRESSURE: 70 MMHG | OXYGEN SATURATION: 98 % | HEART RATE: 70 BPM | BODY MASS INDEX: 33.18 KG/M2 | SYSTOLIC BLOOD PRESSURE: 120 MMHG | WEIGHT: 244.71 LBS

## 2022-03-03 DIAGNOSIS — Z95.5 HISTORY OF HEART ARTERY STENT: Primary | ICD-10-CM

## 2022-03-07 ENCOUNTER — LAB (OUTPATIENT)
Dept: LAB | Facility: HOSPITAL | Age: 82
End: 2022-03-07

## 2022-03-07 ENCOUNTER — OFFICE VISIT (OUTPATIENT)
Dept: CARDIAC REHAB | Facility: HOSPITAL | Age: 82
End: 2022-03-07

## 2022-03-07 VITALS
OXYGEN SATURATION: 95 % | DIASTOLIC BLOOD PRESSURE: 70 MMHG | SYSTOLIC BLOOD PRESSURE: 120 MMHG | BODY MASS INDEX: 33.24 KG/M2 | HEART RATE: 70 BPM | WEIGHT: 245.15 LBS

## 2022-03-07 DIAGNOSIS — E55.9 VITAMIN D DEFICIENCY: ICD-10-CM

## 2022-03-07 DIAGNOSIS — R73.01 IMPAIRED FASTING GLUCOSE: ICD-10-CM

## 2022-03-07 DIAGNOSIS — I10 ESSENTIAL HYPERTENSION: ICD-10-CM

## 2022-03-07 DIAGNOSIS — E53.8 VITAMIN B 12 DEFICIENCY: ICD-10-CM

## 2022-03-07 DIAGNOSIS — I10 PRIMARY HYPERTENSION: ICD-10-CM

## 2022-03-07 DIAGNOSIS — E78.5 HYPERLIPIDEMIA, UNSPECIFIED HYPERLIPIDEMIA TYPE: Chronic | ICD-10-CM

## 2022-03-07 DIAGNOSIS — Z95.5 HISTORY OF HEART ARTERY STENT: Primary | ICD-10-CM

## 2022-03-07 DIAGNOSIS — I25.10 CORONARY ARTERY DISEASE INVOLVING NATIVE CORONARY ARTERY OF NATIVE HEART WITHOUT ANGINA PECTORIS: ICD-10-CM

## 2022-03-07 DIAGNOSIS — E78.2 MIXED HYPERLIPIDEMIA: ICD-10-CM

## 2022-03-07 DIAGNOSIS — E03.9 HYPOTHYROIDISM, UNSPECIFIED TYPE: Chronic | ICD-10-CM

## 2022-03-07 LAB
25(OH)D3 SERPL-MCNC: 42.7 NG/ML (ref 30–100)
ALBUMIN SERPL-MCNC: 4.2 G/DL (ref 3.5–5.2)
ALBUMIN/GLOB SERPL: 2 G/DL
ALP SERPL-CCNC: 65 U/L (ref 39–117)
ALT SERPL W P-5'-P-CCNC: 18 U/L (ref 1–41)
ANION GAP SERPL CALCULATED.3IONS-SCNC: 8.9 MMOL/L (ref 5–15)
AST SERPL-CCNC: 20 U/L (ref 1–40)
BASOPHILS # BLD AUTO: 0.04 10*3/MM3 (ref 0–0.2)
BASOPHILS NFR BLD AUTO: 0.7 % (ref 0–1.5)
BILIRUB SERPL-MCNC: 0.5 MG/DL (ref 0–1.2)
BUN SERPL-MCNC: 28 MG/DL (ref 8–23)
BUN/CREAT SERPL: 18.3 (ref 7–25)
CALCIUM SPEC-SCNC: 9.2 MG/DL (ref 8.6–10.5)
CHLORIDE SERPL-SCNC: 105 MMOL/L (ref 98–107)
CHOLEST SERPL-MCNC: 173 MG/DL (ref 0–200)
CO2 SERPL-SCNC: 26.1 MMOL/L (ref 22–29)
CREAT SERPL-MCNC: 1.53 MG/DL (ref 0.76–1.27)
DEPRECATED RDW RBC AUTO: 44.7 FL (ref 37–54)
EGFRCR SERPLBLD CKD-EPI 2021: 45.4 ML/MIN/1.73
EOSINOPHIL # BLD AUTO: 0.45 10*3/MM3 (ref 0–0.4)
EOSINOPHIL NFR BLD AUTO: 7.4 % (ref 0.3–6.2)
ERYTHROCYTE [DISTWIDTH] IN BLOOD BY AUTOMATED COUNT: 13.2 % (ref 12.3–15.4)
FOLATE SERPL-MCNC: 8.71 NG/ML (ref 4.78–24.2)
GLOBULIN UR ELPH-MCNC: 2.1 GM/DL
GLUCOSE SERPL-MCNC: 127 MG/DL (ref 65–99)
HBA1C MFR BLD: 5.7 % (ref 4.8–5.6)
HCT VFR BLD AUTO: 38.2 % (ref 37.5–51)
HDLC SERPL-MCNC: 40 MG/DL (ref 40–60)
HGB BLD-MCNC: 13 G/DL (ref 13–17.7)
IMM GRANULOCYTES # BLD AUTO: 0.02 10*3/MM3 (ref 0–0.05)
IMM GRANULOCYTES NFR BLD AUTO: 0.3 % (ref 0–0.5)
LDLC SERPL CALC-MCNC: 83 MG/DL (ref 0–100)
LDLC/HDLC SERPL: 1.8 {RATIO}
LYMPHOCYTES # BLD AUTO: 1.7 10*3/MM3 (ref 0.7–3.1)
LYMPHOCYTES NFR BLD AUTO: 28 % (ref 19.6–45.3)
MAGNESIUM SERPL-MCNC: 1.9 MG/DL (ref 1.6–2.4)
MCH RBC QN AUTO: 31.4 PG (ref 26.6–33)
MCHC RBC AUTO-ENTMCNC: 34 G/DL (ref 31.5–35.7)
MCV RBC AUTO: 92.3 FL (ref 79–97)
MONOCYTES # BLD AUTO: 0.57 10*3/MM3 (ref 0.1–0.9)
MONOCYTES NFR BLD AUTO: 9.4 % (ref 5–12)
NEUTROPHILS NFR BLD AUTO: 3.29 10*3/MM3 (ref 1.7–7)
NEUTROPHILS NFR BLD AUTO: 54.2 % (ref 42.7–76)
NRBC BLD AUTO-RTO: 0 /100 WBC (ref 0–0.2)
PLATELET # BLD AUTO: 130 10*3/MM3 (ref 140–450)
PMV BLD AUTO: 10.7 FL (ref 6–12)
POTASSIUM SERPL-SCNC: 4.6 MMOL/L (ref 3.5–5.2)
PROT SERPL-MCNC: 6.3 G/DL (ref 6–8.5)
RBC # BLD AUTO: 4.14 10*6/MM3 (ref 4.14–5.8)
SODIUM SERPL-SCNC: 140 MMOL/L (ref 136–145)
T4 FREE SERPL-MCNC: 1.25 NG/DL (ref 0.93–1.7)
TRIGL SERPL-MCNC: 306 MG/DL (ref 0–150)
TSH SERPL DL<=0.05 MIU/L-ACNC: 3.89 UIU/ML (ref 0.27–4.2)
VIT B12 BLD-MCNC: 413 PG/ML (ref 211–946)
VLDLC SERPL-MCNC: 50 MG/DL (ref 5–40)
WBC NRBC COR # BLD: 6.07 10*3/MM3 (ref 3.4–10.8)

## 2022-03-07 PROCEDURE — 80053 COMPREHEN METABOLIC PANEL: CPT

## 2022-03-07 PROCEDURE — 82746 ASSAY OF FOLIC ACID SERUM: CPT

## 2022-03-07 PROCEDURE — 85025 COMPLETE CBC W/AUTO DIFF WBC: CPT

## 2022-03-07 PROCEDURE — 82607 VITAMIN B-12: CPT

## 2022-03-07 PROCEDURE — 84443 ASSAY THYROID STIM HORMONE: CPT

## 2022-03-07 PROCEDURE — 83036 HEMOGLOBIN GLYCOSYLATED A1C: CPT

## 2022-03-07 PROCEDURE — 80061 LIPID PANEL: CPT

## 2022-03-07 PROCEDURE — 36415 COLL VENOUS BLD VENIPUNCTURE: CPT

## 2022-03-07 PROCEDURE — 82306 VITAMIN D 25 HYDROXY: CPT

## 2022-03-07 PROCEDURE — 84439 ASSAY OF FREE THYROXINE: CPT

## 2022-03-07 PROCEDURE — 83735 ASSAY OF MAGNESIUM: CPT

## 2022-03-08 ENCOUNTER — TELEPHONE (OUTPATIENT)
Dept: CARDIOLOGY | Facility: CLINIC | Age: 82
End: 2022-03-08

## 2022-03-08 RX ORDER — EZETIMIBE 10 MG/1
10 TABLET ORAL DAILY
Qty: 90 TABLET | Refills: 3 | Status: SHIPPED | OUTPATIENT
Start: 2022-03-08

## 2022-03-08 NOTE — TELEPHONE ENCOUNTER
----- Message from Jere Brar MD sent at 3/7/2022  5:51 PM EST -----  Please ask him to read the milligrams of his atorvastatin bottle and find out how much he is truly taking

## 2022-03-08 NOTE — TELEPHONE ENCOUNTER
S/W patient who confirmed he is taking atorvastatin 80 mg qHS.     Advised I would discuss with Dr. Brar and call him back with any additional updates.

## 2022-03-14 ENCOUNTER — APPOINTMENT (OUTPATIENT)
Dept: CARDIAC REHAB | Facility: HOSPITAL | Age: 82
End: 2022-03-14

## 2022-03-17 ENCOUNTER — APPOINTMENT (OUTPATIENT)
Dept: CARDIAC REHAB | Facility: HOSPITAL | Age: 82
End: 2022-03-17

## 2022-03-21 ENCOUNTER — OFFICE VISIT (OUTPATIENT)
Dept: CARDIAC REHAB | Facility: HOSPITAL | Age: 82
End: 2022-03-21

## 2022-03-21 VITALS
OXYGEN SATURATION: 96 % | DIASTOLIC BLOOD PRESSURE: 82 MMHG | WEIGHT: 244.49 LBS | SYSTOLIC BLOOD PRESSURE: 122 MMHG | HEART RATE: 68 BPM | BODY MASS INDEX: 33.15 KG/M2

## 2022-03-21 DIAGNOSIS — Z95.5 HISTORY OF HEART ARTERY STENT: Primary | ICD-10-CM

## 2022-03-24 ENCOUNTER — OFFICE VISIT (OUTPATIENT)
Dept: CARDIAC REHAB | Facility: HOSPITAL | Age: 82
End: 2022-03-24

## 2022-03-24 VITALS
BODY MASS INDEX: 33.09 KG/M2 | DIASTOLIC BLOOD PRESSURE: 74 MMHG | SYSTOLIC BLOOD PRESSURE: 122 MMHG | HEART RATE: 71 BPM | WEIGHT: 244.05 LBS | OXYGEN SATURATION: 97 %

## 2022-03-24 DIAGNOSIS — Z95.5 HISTORY OF HEART ARTERY STENT: Primary | ICD-10-CM

## 2022-03-28 ENCOUNTER — APPOINTMENT (OUTPATIENT)
Dept: CARDIAC REHAB | Facility: HOSPITAL | Age: 82
End: 2022-03-28

## 2022-03-31 ENCOUNTER — APPOINTMENT (OUTPATIENT)
Dept: CARDIAC REHAB | Facility: HOSPITAL | Age: 82
End: 2022-03-31

## 2022-04-04 ENCOUNTER — OFFICE VISIT (OUTPATIENT)
Dept: CARDIAC REHAB | Facility: HOSPITAL | Age: 82
End: 2022-04-04

## 2022-04-04 VITALS
HEART RATE: 61 BPM | DIASTOLIC BLOOD PRESSURE: 80 MMHG | WEIGHT: 247.36 LBS | BODY MASS INDEX: 33.54 KG/M2 | SYSTOLIC BLOOD PRESSURE: 140 MMHG | OXYGEN SATURATION: 96 %

## 2022-04-04 DIAGNOSIS — Z95.5 HISTORY OF HEART ARTERY STENT: Primary | ICD-10-CM

## 2022-04-07 ENCOUNTER — OFFICE VISIT (OUTPATIENT)
Dept: CARDIAC REHAB | Facility: HOSPITAL | Age: 82
End: 2022-04-07

## 2022-04-07 VITALS
SYSTOLIC BLOOD PRESSURE: 110 MMHG | DIASTOLIC BLOOD PRESSURE: 70 MMHG | HEART RATE: 60 BPM | BODY MASS INDEX: 33.75 KG/M2 | OXYGEN SATURATION: 94 % | WEIGHT: 248.9 LBS

## 2022-04-07 DIAGNOSIS — Z95.5 HISTORY OF HEART ARTERY STENT: Primary | ICD-10-CM

## 2022-04-11 ENCOUNTER — APPOINTMENT (OUTPATIENT)
Dept: CARDIAC REHAB | Facility: HOSPITAL | Age: 82
End: 2022-04-11

## 2022-04-14 ENCOUNTER — OFFICE VISIT (OUTPATIENT)
Dept: CARDIAC REHAB | Facility: HOSPITAL | Age: 82
End: 2022-04-14

## 2022-04-14 VITALS — DIASTOLIC BLOOD PRESSURE: 78 MMHG | HEART RATE: 66 BPM | SYSTOLIC BLOOD PRESSURE: 130 MMHG

## 2022-04-14 DIAGNOSIS — Z95.5 HISTORY OF HEART ARTERY STENT: Primary | ICD-10-CM

## 2022-04-18 ENCOUNTER — OFFICE VISIT (OUTPATIENT)
Dept: CARDIAC REHAB | Facility: HOSPITAL | Age: 82
End: 2022-04-18

## 2022-04-18 VITALS
OXYGEN SATURATION: 98 % | BODY MASS INDEX: 33.63 KG/M2 | HEART RATE: 60 BPM | WEIGHT: 248.02 LBS | DIASTOLIC BLOOD PRESSURE: 80 MMHG | SYSTOLIC BLOOD PRESSURE: 140 MMHG

## 2022-04-18 DIAGNOSIS — Z95.5 HISTORY OF HEART ARTERY STENT: Primary | ICD-10-CM

## 2022-04-21 ENCOUNTER — OFFICE VISIT (OUTPATIENT)
Dept: CARDIAC REHAB | Facility: HOSPITAL | Age: 82
End: 2022-04-21

## 2022-04-21 VITALS
WEIGHT: 246.91 LBS | DIASTOLIC BLOOD PRESSURE: 70 MMHG | BODY MASS INDEX: 33.48 KG/M2 | HEART RATE: 65 BPM | SYSTOLIC BLOOD PRESSURE: 108 MMHG | OXYGEN SATURATION: 95 %

## 2022-04-21 DIAGNOSIS — Z95.5 HISTORY OF HEART ARTERY STENT: Primary | ICD-10-CM

## 2022-04-25 ENCOUNTER — APPOINTMENT (OUTPATIENT)
Dept: CARDIAC REHAB | Facility: HOSPITAL | Age: 82
End: 2022-04-25

## 2022-04-28 ENCOUNTER — OFFICE VISIT (OUTPATIENT)
Dept: CARDIAC REHAB | Facility: HOSPITAL | Age: 82
End: 2022-04-28

## 2022-04-28 VITALS
HEART RATE: 68 BPM | BODY MASS INDEX: 33.3 KG/M2 | DIASTOLIC BLOOD PRESSURE: 70 MMHG | WEIGHT: 245.59 LBS | OXYGEN SATURATION: 96 % | SYSTOLIC BLOOD PRESSURE: 116 MMHG

## 2022-04-28 DIAGNOSIS — Z95.5 HISTORY OF HEART ARTERY STENT: Primary | ICD-10-CM

## 2022-05-02 ENCOUNTER — OFFICE VISIT (OUTPATIENT)
Dept: CARDIAC REHAB | Facility: HOSPITAL | Age: 82
End: 2022-05-02

## 2022-05-02 VITALS
BODY MASS INDEX: 33.09 KG/M2 | SYSTOLIC BLOOD PRESSURE: 122 MMHG | OXYGEN SATURATION: 96 % | WEIGHT: 244.05 LBS | HEART RATE: 70 BPM | DIASTOLIC BLOOD PRESSURE: 82 MMHG

## 2022-05-02 DIAGNOSIS — Z95.5 HISTORY OF HEART ARTERY STENT: Primary | ICD-10-CM

## 2022-05-05 ENCOUNTER — OFFICE VISIT (OUTPATIENT)
Dept: CARDIAC REHAB | Facility: HOSPITAL | Age: 82
End: 2022-05-05

## 2022-05-05 VITALS
HEART RATE: 74 BPM | BODY MASS INDEX: 33.15 KG/M2 | WEIGHT: 244.49 LBS | OXYGEN SATURATION: 94 % | DIASTOLIC BLOOD PRESSURE: 70 MMHG | SYSTOLIC BLOOD PRESSURE: 120 MMHG

## 2022-05-05 DIAGNOSIS — Z95.5 HISTORY OF HEART ARTERY STENT: Primary | ICD-10-CM

## 2022-05-12 ENCOUNTER — OFFICE VISIT (OUTPATIENT)
Dept: CARDIAC REHAB | Facility: HOSPITAL | Age: 82
End: 2022-05-12

## 2022-05-12 VITALS
SYSTOLIC BLOOD PRESSURE: 120 MMHG | BODY MASS INDEX: 32.88 KG/M2 | DIASTOLIC BLOOD PRESSURE: 78 MMHG | HEART RATE: 69 BPM | OXYGEN SATURATION: 95 % | WEIGHT: 242.51 LBS

## 2022-05-12 DIAGNOSIS — Z95.5 HISTORY OF HEART ARTERY STENT: Primary | ICD-10-CM

## 2022-05-16 ENCOUNTER — APPOINTMENT (OUTPATIENT)
Dept: CARDIAC REHAB | Facility: HOSPITAL | Age: 82
End: 2022-05-16

## 2022-05-19 ENCOUNTER — OFFICE VISIT (OUTPATIENT)
Dept: CARDIAC REHAB | Facility: HOSPITAL | Age: 82
End: 2022-05-19

## 2022-05-19 VITALS
HEART RATE: 70 BPM | DIASTOLIC BLOOD PRESSURE: 70 MMHG | BODY MASS INDEX: 32.58 KG/M2 | WEIGHT: 240.3 LBS | SYSTOLIC BLOOD PRESSURE: 110 MMHG | OXYGEN SATURATION: 96 %

## 2022-05-19 DIAGNOSIS — Z95.5 HISTORY OF HEART ARTERY STENT: Primary | ICD-10-CM

## 2022-05-23 ENCOUNTER — OFFICE VISIT (OUTPATIENT)
Dept: CARDIAC REHAB | Facility: HOSPITAL | Age: 82
End: 2022-05-23

## 2022-05-23 VITALS
BODY MASS INDEX: 33.09 KG/M2 | HEART RATE: 64 BPM | WEIGHT: 244.05 LBS | OXYGEN SATURATION: 96 % | SYSTOLIC BLOOD PRESSURE: 130 MMHG | DIASTOLIC BLOOD PRESSURE: 70 MMHG

## 2022-05-23 DIAGNOSIS — Z95.5 HISTORY OF HEART ARTERY STENT: Primary | ICD-10-CM

## 2022-06-02 ENCOUNTER — APPOINTMENT (OUTPATIENT)
Dept: CARDIAC REHAB | Facility: HOSPITAL | Age: 82
End: 2022-06-02

## 2022-06-06 ENCOUNTER — APPOINTMENT (OUTPATIENT)
Dept: CARDIAC REHAB | Facility: HOSPITAL | Age: 82
End: 2022-06-06

## 2022-06-09 ENCOUNTER — APPOINTMENT (OUTPATIENT)
Dept: CARDIAC REHAB | Facility: HOSPITAL | Age: 82
End: 2022-06-09

## 2022-06-13 ENCOUNTER — LAB (OUTPATIENT)
Dept: LAB | Facility: HOSPITAL | Age: 82
End: 2022-06-13

## 2022-06-13 ENCOUNTER — OFFICE VISIT (OUTPATIENT)
Dept: CARDIAC REHAB | Facility: HOSPITAL | Age: 82
End: 2022-06-13

## 2022-06-13 VITALS
WEIGHT: 241.62 LBS | BODY MASS INDEX: 32.76 KG/M2 | OXYGEN SATURATION: 96 % | SYSTOLIC BLOOD PRESSURE: 112 MMHG | DIASTOLIC BLOOD PRESSURE: 70 MMHG

## 2022-06-13 DIAGNOSIS — E78.2 MIXED HYPERLIPIDEMIA: ICD-10-CM

## 2022-06-13 DIAGNOSIS — Z95.5 HISTORY OF HEART ARTERY STENT: Primary | ICD-10-CM

## 2022-06-13 DIAGNOSIS — I10 PRIMARY HYPERTENSION: ICD-10-CM

## 2022-06-13 DIAGNOSIS — I10 ESSENTIAL HYPERTENSION: Chronic | ICD-10-CM

## 2022-06-13 DIAGNOSIS — I25.10 CORONARY ARTERY DISEASE INVOLVING NATIVE CORONARY ARTERY OF NATIVE HEART WITHOUT ANGINA PECTORIS: ICD-10-CM

## 2022-06-13 LAB
ALBUMIN SERPL-MCNC: 4.4 G/DL (ref 3.5–5.2)
ALBUMIN/GLOB SERPL: 2.1 G/DL
ALP SERPL-CCNC: 57 U/L (ref 39–117)
ALT SERPL W P-5'-P-CCNC: 21 U/L (ref 1–41)
ANION GAP SERPL CALCULATED.3IONS-SCNC: 10 MMOL/L (ref 5–15)
AST SERPL-CCNC: 17 U/L (ref 1–40)
BILIRUB SERPL-MCNC: 0.6 MG/DL (ref 0–1.2)
BUN SERPL-MCNC: 26 MG/DL (ref 8–23)
BUN/CREAT SERPL: 15.4 (ref 7–25)
CALCIUM SPEC-SCNC: 9.6 MG/DL (ref 8.6–10.5)
CHLORIDE SERPL-SCNC: 103 MMOL/L (ref 98–107)
CHOLEST SERPL-MCNC: 125 MG/DL (ref 0–200)
CO2 SERPL-SCNC: 23 MMOL/L (ref 22–29)
CREAT SERPL-MCNC: 1.69 MG/DL (ref 0.76–1.27)
EGFRCR SERPLBLD CKD-EPI 2021: 40 ML/MIN/1.73
GLOBULIN UR ELPH-MCNC: 2.1 GM/DL
GLUCOSE SERPL-MCNC: 101 MG/DL (ref 65–99)
HDLC SERPL-MCNC: 39 MG/DL (ref 40–60)
LDLC SERPL CALC-MCNC: 55 MG/DL (ref 0–100)
LDLC/HDLC SERPL: 1.24 {RATIO}
MAGNESIUM SERPL-MCNC: 2 MG/DL (ref 1.6–2.4)
POTASSIUM SERPL-SCNC: 5 MMOL/L (ref 3.5–5.2)
PROT SERPL-MCNC: 6.5 G/DL (ref 6–8.5)
SODIUM SERPL-SCNC: 136 MMOL/L (ref 136–145)
TRIGL SERPL-MCNC: 188 MG/DL (ref 0–150)
VLDLC SERPL-MCNC: 31 MG/DL (ref 5–40)

## 2022-06-13 PROCEDURE — 83735 ASSAY OF MAGNESIUM: CPT

## 2022-06-13 PROCEDURE — 80053 COMPREHEN METABOLIC PANEL: CPT

## 2022-06-13 PROCEDURE — 80061 LIPID PANEL: CPT

## 2022-06-13 PROCEDURE — 36415 COLL VENOUS BLD VENIPUNCTURE: CPT

## 2022-06-16 ENCOUNTER — OFFICE VISIT (OUTPATIENT)
Dept: CARDIAC REHAB | Facility: HOSPITAL | Age: 82
End: 2022-06-16

## 2022-06-16 VITALS
SYSTOLIC BLOOD PRESSURE: 112 MMHG | DIASTOLIC BLOOD PRESSURE: 62 MMHG | HEART RATE: 68 BPM | WEIGHT: 245.15 LBS | OXYGEN SATURATION: 96 % | BODY MASS INDEX: 33.24 KG/M2

## 2022-06-16 DIAGNOSIS — Z95.5 HISTORY OF HEART ARTERY STENT: Primary | ICD-10-CM

## 2022-06-20 ENCOUNTER — OFFICE VISIT (OUTPATIENT)
Dept: CARDIAC REHAB | Facility: HOSPITAL | Age: 82
End: 2022-06-20

## 2022-06-20 VITALS
DIASTOLIC BLOOD PRESSURE: 82 MMHG | SYSTOLIC BLOOD PRESSURE: 124 MMHG | BODY MASS INDEX: 33.27 KG/M2 | OXYGEN SATURATION: 97 % | WEIGHT: 245.37 LBS | HEART RATE: 66 BPM

## 2022-06-20 DIAGNOSIS — Z95.5 HISTORY OF HEART ARTERY STENT: Primary | ICD-10-CM

## 2022-06-23 ENCOUNTER — OFFICE VISIT (OUTPATIENT)
Dept: CARDIAC REHAB | Facility: HOSPITAL | Age: 82
End: 2022-06-23

## 2022-06-23 VITALS
BODY MASS INDEX: 33.12 KG/M2 | DIASTOLIC BLOOD PRESSURE: 60 MMHG | OXYGEN SATURATION: 97 % | WEIGHT: 244.27 LBS | SYSTOLIC BLOOD PRESSURE: 110 MMHG | HEART RATE: 69 BPM

## 2022-06-23 DIAGNOSIS — Z95.5 HISTORY OF HEART ARTERY STENT: Primary | ICD-10-CM

## 2022-06-27 ENCOUNTER — APPOINTMENT (OUTPATIENT)
Dept: CARDIAC REHAB | Facility: HOSPITAL | Age: 82
End: 2022-06-27

## 2022-06-30 ENCOUNTER — APPOINTMENT (OUTPATIENT)
Dept: CARDIAC REHAB | Facility: HOSPITAL | Age: 82
End: 2022-06-30

## 2022-07-04 ENCOUNTER — APPOINTMENT (OUTPATIENT)
Dept: CARDIAC REHAB | Facility: HOSPITAL | Age: 82
End: 2022-07-04

## 2022-07-06 ENCOUNTER — PATIENT OUTREACH (OUTPATIENT)
Dept: PHARMACY | Facility: HOSPITAL | Age: 82
End: 2022-07-06

## 2022-07-06 NOTE — OUTREACH NOTE
Medication Adherence Call    Patient was called to discuss medication adherence, as he was identified as having care opportunities. I specifically called about irbesartan as per [employer] claims, his last fill was a 90 day supply in January.     I called multiple times and left a voicemail with no response.    Aminata Mitchell, PharmD  Population Health Pharmacist  07/06/22

## 2022-07-07 ENCOUNTER — OFFICE VISIT (OUTPATIENT)
Dept: CARDIAC REHAB | Facility: HOSPITAL | Age: 82
End: 2022-07-07

## 2022-07-07 VITALS
DIASTOLIC BLOOD PRESSURE: 70 MMHG | HEART RATE: 68 BPM | BODY MASS INDEX: 33.03 KG/M2 | WEIGHT: 243.61 LBS | OXYGEN SATURATION: 96 % | SYSTOLIC BLOOD PRESSURE: 124 MMHG

## 2022-07-07 DIAGNOSIS — Z95.5 HISTORY OF HEART ARTERY STENT: Primary | ICD-10-CM

## 2022-07-11 ENCOUNTER — OFFICE VISIT (OUTPATIENT)
Dept: CARDIAC REHAB | Facility: HOSPITAL | Age: 82
End: 2022-07-11

## 2022-07-11 VITALS
OXYGEN SATURATION: 95 % | BODY MASS INDEX: 33.45 KG/M2 | DIASTOLIC BLOOD PRESSURE: 72 MMHG | WEIGHT: 246.69 LBS | HEART RATE: 73 BPM | SYSTOLIC BLOOD PRESSURE: 128 MMHG

## 2022-07-11 DIAGNOSIS — Z95.5 HISTORY OF HEART ARTERY STENT: Primary | ICD-10-CM

## 2022-07-11 NOTE — PROGRESS NOTES
Tolerated Session Well.  See session report.  
recovery  RTO 2 week PP for routine PP visit and incision check

## 2022-07-14 ENCOUNTER — APPOINTMENT (OUTPATIENT)
Dept: CARDIAC REHAB | Facility: HOSPITAL | Age: 82
End: 2022-07-14

## 2022-07-21 ENCOUNTER — OFFICE VISIT (OUTPATIENT)
Dept: CARDIAC REHAB | Facility: HOSPITAL | Age: 82
End: 2022-07-21

## 2022-07-21 VITALS
SYSTOLIC BLOOD PRESSURE: 140 MMHG | DIASTOLIC BLOOD PRESSURE: 68 MMHG | OXYGEN SATURATION: 95 % | WEIGHT: 245.81 LBS | BODY MASS INDEX: 33.33 KG/M2 | HEART RATE: 68 BPM

## 2022-07-21 DIAGNOSIS — Z95.5 HISTORY OF HEART ARTERY STENT: Primary | ICD-10-CM

## 2022-07-25 ENCOUNTER — OFFICE VISIT (OUTPATIENT)
Dept: CARDIAC REHAB | Facility: HOSPITAL | Age: 82
End: 2022-07-25

## 2022-07-25 VITALS
DIASTOLIC BLOOD PRESSURE: 70 MMHG | WEIGHT: 243.17 LBS | HEART RATE: 70 BPM | SYSTOLIC BLOOD PRESSURE: 120 MMHG | BODY MASS INDEX: 32.97 KG/M2

## 2022-07-25 DIAGNOSIS — Z95.5 HISTORY OF HEART ARTERY STENT: Primary | ICD-10-CM

## 2022-07-28 ENCOUNTER — OFFICE VISIT (OUTPATIENT)
Dept: CARDIAC REHAB | Facility: HOSPITAL | Age: 82
End: 2022-07-28

## 2022-07-28 VITALS
BODY MASS INDEX: 33.18 KG/M2 | DIASTOLIC BLOOD PRESSURE: 62 MMHG | OXYGEN SATURATION: 97 % | SYSTOLIC BLOOD PRESSURE: 128 MMHG | WEIGHT: 244.71 LBS | HEART RATE: 67 BPM

## 2022-07-28 DIAGNOSIS — Z95.5 HISTORY OF HEART ARTERY STENT: Primary | ICD-10-CM

## 2022-08-01 ENCOUNTER — OFFICE VISIT (OUTPATIENT)
Dept: CARDIAC REHAB | Facility: HOSPITAL | Age: 82
End: 2022-08-01

## 2022-08-01 VITALS
DIASTOLIC BLOOD PRESSURE: 68 MMHG | WEIGHT: 244.27 LBS | OXYGEN SATURATION: 96 % | BODY MASS INDEX: 33.12 KG/M2 | SYSTOLIC BLOOD PRESSURE: 130 MMHG | HEART RATE: 68 BPM

## 2022-08-01 DIAGNOSIS — Z95.5 HISTORY OF HEART ARTERY STENT: Primary | ICD-10-CM

## 2022-08-01 PROCEDURE — 93798 PHYS/QHP OP CAR RHAB W/ECG: CPT

## 2022-08-04 ENCOUNTER — APPOINTMENT (OUTPATIENT)
Dept: CARDIAC REHAB | Facility: HOSPITAL | Age: 82
End: 2022-08-04

## 2022-08-11 ENCOUNTER — OFFICE VISIT (OUTPATIENT)
Dept: CARDIAC REHAB | Facility: HOSPITAL | Age: 82
End: 2022-08-11

## 2022-08-11 VITALS
DIASTOLIC BLOOD PRESSURE: 80 MMHG | WEIGHT: 244.71 LBS | BODY MASS INDEX: 33.18 KG/M2 | OXYGEN SATURATION: 97 % | HEART RATE: 70 BPM | SYSTOLIC BLOOD PRESSURE: 148 MMHG

## 2022-08-11 DIAGNOSIS — Z95.5 HISTORY OF HEART ARTERY STENT: Primary | ICD-10-CM

## 2022-08-15 ENCOUNTER — OFFICE VISIT (OUTPATIENT)
Dept: INTERNAL MEDICINE | Facility: CLINIC | Age: 82
End: 2022-08-15

## 2022-08-15 ENCOUNTER — TRANSCRIBE ORDERS (OUTPATIENT)
Dept: LAB | Facility: HOSPITAL | Age: 82
End: 2022-08-15

## 2022-08-15 ENCOUNTER — LAB (OUTPATIENT)
Dept: LAB | Facility: HOSPITAL | Age: 82
End: 2022-08-15

## 2022-08-15 ENCOUNTER — OFFICE VISIT (OUTPATIENT)
Dept: CARDIAC REHAB | Facility: HOSPITAL | Age: 82
End: 2022-08-15

## 2022-08-15 VITALS
WEIGHT: 244.49 LBS | OXYGEN SATURATION: 96 % | HEART RATE: 81 BPM | BODY MASS INDEX: 33.15 KG/M2 | DIASTOLIC BLOOD PRESSURE: 70 MMHG | SYSTOLIC BLOOD PRESSURE: 128 MMHG

## 2022-08-15 VITALS
WEIGHT: 243 LBS | SYSTOLIC BLOOD PRESSURE: 123 MMHG | OXYGEN SATURATION: 94 % | HEIGHT: 72 IN | DIASTOLIC BLOOD PRESSURE: 78 MMHG | BODY MASS INDEX: 32.91 KG/M2 | HEART RATE: 70 BPM | TEMPERATURE: 97 F

## 2022-08-15 DIAGNOSIS — K21.9 GASTROESOPHAGEAL REFLUX DISEASE WITHOUT ESOPHAGITIS: ICD-10-CM

## 2022-08-15 DIAGNOSIS — I10 HYPERTENSION, ESSENTIAL: ICD-10-CM

## 2022-08-15 DIAGNOSIS — E78.2 MIXED HYPERLIPIDEMIA: ICD-10-CM

## 2022-08-15 DIAGNOSIS — R10.84 ABDOMINAL PAIN, GENERALIZED: ICD-10-CM

## 2022-08-15 DIAGNOSIS — E03.9 HYPOTHYROIDISM, UNSPECIFIED TYPE: ICD-10-CM

## 2022-08-15 DIAGNOSIS — E53.8 VITAMIN B 12 DEFICIENCY: ICD-10-CM

## 2022-08-15 DIAGNOSIS — I25.10 CORONARY ARTERY DISEASE INVOLVING NATIVE CORONARY ARTERY OF NATIVE HEART WITHOUT ANGINA PECTORIS: ICD-10-CM

## 2022-08-15 DIAGNOSIS — Z95.5 HISTORY OF HEART ARTERY STENT: Primary | ICD-10-CM

## 2022-08-15 DIAGNOSIS — N18.32 STAGE 3B CHRONIC KIDNEY DISEASE: ICD-10-CM

## 2022-08-15 DIAGNOSIS — I48.0 PAROXYSMAL ATRIAL FIBRILLATION: ICD-10-CM

## 2022-08-15 DIAGNOSIS — N18.32 CHRONIC KIDNEY DISEASE (CKD) STAGE G3B/A1, MODERATELY DECREASED GLOMERULAR FILTRATION RATE (GFR) BETWEEN 30-44 ML/MIN/1.73 SQUARE METER AND ALBUMINURIA CREATININE RATIO LESS THAN 30 MG/G (CMS/H*: ICD-10-CM

## 2022-08-15 DIAGNOSIS — I10 ESSENTIAL HYPERTENSION: ICD-10-CM

## 2022-08-15 DIAGNOSIS — N18.32 CHRONIC KIDNEY DISEASE (CKD) STAGE G3B/A1, MODERATELY DECREASED GLOMERULAR FILTRATION RATE (GFR) BETWEEN 30-44 ML/MIN/1.73 SQUARE METER AND ALBUMINURIA CREATININE RATIO LESS THAN 30 MG/G (CMS/H*: Primary | ICD-10-CM

## 2022-08-15 DIAGNOSIS — E55.9 VITAMIN D DEFICIENCY: Primary | ICD-10-CM

## 2022-08-15 DIAGNOSIS — E55.9 VITAMIN D DEFICIENCY: ICD-10-CM

## 2022-08-15 LAB
25(OH)D3 SERPL-MCNC: 65.3 NG/ML (ref 30–100)
ALBUMIN SERPL-MCNC: 4.3 G/DL (ref 3.5–5.2)
ALBUMIN/GLOB SERPL: 1.6 G/DL
ALP SERPL-CCNC: 91 U/L (ref 39–117)
ALT SERPL W P-5'-P-CCNC: 16 U/L (ref 1–41)
ANION GAP SERPL CALCULATED.3IONS-SCNC: 10.1 MMOL/L (ref 5–15)
AST SERPL-CCNC: 20 U/L (ref 1–40)
BASOPHILS # BLD AUTO: 0.04 10*3/MM3 (ref 0–0.2)
BASOPHILS NFR BLD AUTO: 0.6 % (ref 0–1.5)
BILIRUB SERPL-MCNC: 0.4 MG/DL (ref 0–1.2)
BILIRUB UR QL STRIP: NEGATIVE
BUN SERPL-MCNC: 21 MG/DL (ref 8–23)
BUN/CREAT SERPL: 12.1 (ref 7–25)
CALCIUM SPEC-SCNC: 9.1 MG/DL (ref 8.6–10.5)
CHLORIDE SERPL-SCNC: 100 MMOL/L (ref 98–107)
CHOLEST SERPL-MCNC: 108 MG/DL (ref 0–200)
CLARITY UR: CLEAR
CO2 SERPL-SCNC: 26.9 MMOL/L (ref 22–29)
COLOR UR: YELLOW
CREAT SERPL-MCNC: 1.73 MG/DL (ref 0.76–1.27)
CREAT UR-MCNC: 35.2 MG/DL
DEPRECATED RDW RBC AUTO: 43.3 FL (ref 37–54)
EGFRCR SERPLBLD CKD-EPI 2021: 38.9 ML/MIN/1.73
EOSINOPHIL # BLD AUTO: 0.53 10*3/MM3 (ref 0–0.4)
EOSINOPHIL NFR BLD AUTO: 8.6 % (ref 0.3–6.2)
ERYTHROCYTE [DISTWIDTH] IN BLOOD BY AUTOMATED COUNT: 12.9 % (ref 12.3–15.4)
GLOBULIN UR ELPH-MCNC: 2.7 GM/DL
GLUCOSE SERPL-MCNC: 112 MG/DL (ref 65–99)
GLUCOSE UR STRIP-MCNC: NEGATIVE MG/DL
HCT VFR BLD AUTO: 39.7 % (ref 37.5–51)
HDLC SERPL-MCNC: 34 MG/DL (ref 40–60)
HGB BLD-MCNC: 13.8 G/DL (ref 13–17.7)
HGB UR QL STRIP.AUTO: NEGATIVE
IMM GRANULOCYTES # BLD AUTO: 0.01 10*3/MM3 (ref 0–0.05)
IMM GRANULOCYTES NFR BLD AUTO: 0.2 % (ref 0–0.5)
KETONES UR QL STRIP: NEGATIVE
LDLC SERPL CALC-MCNC: 48 MG/DL (ref 0–100)
LDLC/HDLC SERPL: 1.27 {RATIO}
LEUKOCYTE ESTERASE UR QL STRIP.AUTO: NEGATIVE
LYMPHOCYTES # BLD AUTO: 1.27 10*3/MM3 (ref 0.7–3.1)
LYMPHOCYTES NFR BLD AUTO: 20.6 % (ref 19.6–45.3)
MCH RBC QN AUTO: 31.9 PG (ref 26.6–33)
MCHC RBC AUTO-ENTMCNC: 34.8 G/DL (ref 31.5–35.7)
MCV RBC AUTO: 91.9 FL (ref 79–97)
MONOCYTES # BLD AUTO: 0.83 10*3/MM3 (ref 0.1–0.9)
MONOCYTES NFR BLD AUTO: 13.5 % (ref 5–12)
NEUTROPHILS NFR BLD AUTO: 3.49 10*3/MM3 (ref 1.7–7)
NEUTROPHILS NFR BLD AUTO: 56.5 % (ref 42.7–76)
NITRITE UR QL STRIP: NEGATIVE
NRBC BLD AUTO-RTO: 0 /100 WBC (ref 0–0.2)
PH UR STRIP.AUTO: 6 [PH] (ref 5–8)
PHOSPHATE SERPL-MCNC: 3.8 MG/DL (ref 2.5–4.5)
PLATELET # BLD AUTO: 109 10*3/MM3 (ref 140–450)
PMV BLD AUTO: 11.5 FL (ref 6–12)
POTASSIUM SERPL-SCNC: 4.5 MMOL/L (ref 3.5–5.2)
PROT ?TM UR-MCNC: 5 MG/DL
PROT SERPL-MCNC: 7 G/DL (ref 6–8.5)
PROT UR QL STRIP: NEGATIVE
PROT/CREAT UR: 0.14 MG/G{CREAT}
PTH-INTACT SERPL-MCNC: 60.4 PG/ML (ref 15–65)
RBC # BLD AUTO: 4.32 10*6/MM3 (ref 4.14–5.8)
SODIUM SERPL-SCNC: 137 MMOL/L (ref 136–145)
SP GR UR STRIP: 1.01 (ref 1–1.03)
T4 FREE SERPL-MCNC: 1.36 NG/DL (ref 0.93–1.7)
TRIGL SERPL-MCNC: 154 MG/DL (ref 0–150)
TSH SERPL DL<=0.05 MIU/L-ACNC: 3.15 UIU/ML (ref 0.27–4.2)
UROBILINOGEN UR QL STRIP: NORMAL
VLDLC SERPL-MCNC: 26 MG/DL (ref 5–40)
WBC NRBC COR # BLD: 6.17 10*3/MM3 (ref 3.4–10.8)

## 2022-08-15 PROCEDURE — 82306 VITAMIN D 25 HYDROXY: CPT

## 2022-08-15 PROCEDURE — 83970 ASSAY OF PARATHORMONE: CPT

## 2022-08-15 PROCEDURE — 96372 THER/PROPH/DIAG INJ SC/IM: CPT | Performed by: INTERNAL MEDICINE

## 2022-08-15 PROCEDURE — 36415 COLL VENOUS BLD VENIPUNCTURE: CPT

## 2022-08-15 PROCEDURE — 84100 ASSAY OF PHOSPHORUS: CPT

## 2022-08-15 PROCEDURE — 85025 COMPLETE CBC W/AUTO DIFF WBC: CPT

## 2022-08-15 PROCEDURE — 80061 LIPID PANEL: CPT

## 2022-08-15 PROCEDURE — 80053 COMPREHEN METABOLIC PANEL: CPT

## 2022-08-15 PROCEDURE — 84156 ASSAY OF PROTEIN URINE: CPT

## 2022-08-15 PROCEDURE — 81003 URINALYSIS AUTO W/O SCOPE: CPT

## 2022-08-15 PROCEDURE — 82570 ASSAY OF URINE CREATININE: CPT

## 2022-08-15 PROCEDURE — 99214 OFFICE O/P EST MOD 30 MIN: CPT | Performed by: INTERNAL MEDICINE

## 2022-08-15 PROCEDURE — 84439 ASSAY OF FREE THYROXINE: CPT

## 2022-08-15 PROCEDURE — 84443 ASSAY THYROID STIM HORMONE: CPT

## 2022-08-15 RX ORDER — CYANOCOBALAMIN 1000 UG/ML
1000 INJECTION, SOLUTION INTRAMUSCULAR; SUBCUTANEOUS
Status: SHIPPED | OUTPATIENT
Start: 2022-08-15

## 2022-08-15 RX ADMIN — CYANOCOBALAMIN 1000 MCG: 1000 INJECTION, SOLUTION INTRAMUSCULAR; SUBCUTANEOUS at 16:22

## 2022-08-15 NOTE — PROGRESS NOTES
"Chief Complaint/ HPI: Patient is here for routine 6-month follow-up, previous abdominal pains have gotten better, he is here for lab work that was done in June 13, 2022, reviewed with patient today,      Objective   Vital Signs  Vitals:    08/15/22 1307   BP: 123/78   Pulse: 70   Temp: 97 °F (36.1 °C)   SpO2: 94%   Weight: 110 kg (243 lb)   Height: 182.9 cm (72.01\")      Body mass index is 32.95 kg/m².  Review of Systems   Constitutional: Negative.    HENT: Negative.    Eyes: Negative.    Respiratory: Negative.    Cardiovascular: Negative.    Gastrointestinal: Negative.    Endocrine: Negative.    Genitourinary: Negative.    Musculoskeletal: Negative.    Allergic/Immunologic: Negative.    Neurological: Negative.    Hematological: Negative.    Psychiatric/Behavioral: Negative.       Physical Exam  Constitutional:       General: He is not in acute distress.     Appearance: Normal appearance.   HENT:      Head: Normocephalic.      Mouth/Throat:      Mouth: Mucous membranes are moist.   Eyes:      Conjunctiva/sclera: Conjunctivae normal.      Pupils: Pupils are equal, round, and reactive to light.   Cardiovascular:      Rate and Rhythm: Normal rate and regular rhythm.      Pulses: Normal pulses.      Heart sounds: Normal heart sounds.   Pulmonary:      Effort: Pulmonary effort is normal.      Breath sounds: Normal breath sounds.   Abdominal:      General: Abdomen is flat. Bowel sounds are normal.      Palpations: Abdomen is soft.   Musculoskeletal:         General: No swelling. Normal range of motion.      Cervical back: Neck supple.   Skin:     General: Skin is warm and dry.      Coloration: Skin is not jaundiced.   Neurological:      General: No focal deficit present.      Mental Status: He is alert and oriented to person, place, and time. Mental status is at baseline.   Psychiatric:         Mood and Affect: Mood normal.         Behavior: Behavior normal.         Thought Content: Thought content normal.         " Judgment: Judgment normal.        Result Review :   Lab Results   Component Value Date    PROBNP 30.7 09/14/2021    PROBNP 17.7 06/23/2021     CMP    CMP 1/25/22 3/7/22 6/13/22   Glucose 113 (A) 127 (A) 101 (A)   BUN 17 28 (A) 26 (A)   Creatinine 1.56 (A) 1.53 (A) 1.69 (A)   eGFR Non African Am 43 (A)     Sodium 136 140 136   Potassium 3.9 4.6 5.0   Chloride 100 105 103   Calcium 9.4 9.2 9.6   Albumin 4.30 4.20 4.40   Total Bilirubin 0.6 0.5 0.6   Alkaline Phosphatase 81 65 57   AST (SGOT) 18 20 17   ALT (SGPT) 21 18 21   (A) Abnormal value            CBC w/diff    CBC w/Diff 1/25/22 3/7/22 8/15/22   WBC 6.02 6.07 6.17   RBC 4.73 4.14 4.32   Hemoglobin 14.6 13.0 13.8   Hematocrit 42.6 38.2 39.7   MCV 90.1 92.3 91.9   MCH 30.9 31.4 31.9   MCHC 34.3 34.0 34.8   RDW 12.9 13.2 12.9   Platelets 138 (A) 130 (A) 109 (A)   Neutrophil Rel % 55.0 54.2 56.5   Immature Granulocyte Rel % 0.3 0.3 0.2   Lymphocyte Rel % 30.9 28.0 20.6   Monocyte Rel % 9.3 9.4 13.5 (A)   Eosinophil Rel % 3.7 7.4 (A) 8.6 (A)   Basophil Rel % 0.8 0.7 0.6   (A) Abnormal value             Lipid Panel    Lipid Panel 11/29/21 3/7/22 6/13/22   Total Cholesterol 163 173 125   Triglycerides 219 (A) 306 (A) 188 (A)   HDL Cholesterol 41 40 39 (A)   VLDL Cholesterol 37 50 (A) 31   LDL Cholesterol  85 83 55   LDL/HDL Ratio 1.91 1.80 1.24   (A) Abnormal value             Lab Results   Component Value Date    TSH 3.890 03/07/2022    TSH 2.300 11/29/2021    TSH 3.440 09/14/2021      Lab Results   Component Value Date    FREET4 1.25 03/07/2022    FREET4 1.39 11/29/2021    FREET4 1.20 06/23/2021      A1C Last 3 Results    HGBA1C Last 3 Results 3/7/22   Hemoglobin A1C 5.70 (A)   (A) Abnormal value             PSA    PSA 10/20/21   PSA 0.246                          Visit Diagnoses:    ICD-10-CM ICD-9-CM   1. Vitamin D deficiency  E55.9 268.9   2. Stage 3b chronic kidney disease (HCC)  N18.32 585.3   3. Hypertension, essential  I10 401.9   4. Abdominal pain,  generalized  R10.84 789.07   5. Vitamin B 12 deficiency  E53.8 266.2   6. Hypothyroidism, unspecified type  E03.9 244.9   7. Paroxysmal atrial fibrillation (HCC)  I48.0 427.31   8. Coronary artery disease involving native coronary artery of native heart without angina pectoris  I25.10 414.01   9. Gastroesophageal reflux disease without esophagitis  K21.9 530.81   10. Mixed hyperlipidemia  E78.2 272.2   11. Essential hypertension  I10 401.9       Assessment and Plan   Diagnoses and all orders for this visit:    1. Vitamin D deficiency (Primary)  -     CBC & Differential; Future  -     Comprehensive Metabolic Panel; Future  -     Lipid Panel; Future  -     TSH+Free T4; Future    2. Stage 3b chronic kidney disease (HCC)  -     CBC & Differential; Future  -     Comprehensive Metabolic Panel; Future  -     Lipid Panel; Future  -     TSH+Free T4; Future    3. Hypertension, essential  -     CBC & Differential; Future  -     Comprehensive Metabolic Panel; Future  -     Lipid Panel; Future  -     TSH+Free T4; Future    4. Abdominal pain, generalized  -     CBC & Differential; Future  -     Comprehensive Metabolic Panel; Future  -     Lipid Panel; Future  -     TSH+Free T4; Future    5. Vitamin B 12 deficiency  -     CBC & Differential; Future  -     Comprehensive Metabolic Panel; Future  -     Lipid Panel; Future  -     TSH+Free T4; Future    6. Hypothyroidism, unspecified type  -     CBC & Differential; Future  -     Comprehensive Metabolic Panel; Future  -     Lipid Panel; Future  -     TSH+Free T4; Future    7. Paroxysmal atrial fibrillation (HCC)  -     CBC & Differential; Future  -     Comprehensive Metabolic Panel; Future  -     Lipid Panel; Future  -     TSH+Free T4; Future    8. Coronary artery disease involving native coronary artery of native heart without angina pectoris  -     CBC & Differential; Future  -     Comprehensive Metabolic Panel; Future  -     Lipid Panel; Future  -     TSH+Free T4; Future    9.  Gastroesophageal reflux disease without esophagitis  -     CBC & Differential; Future  -     Comprehensive Metabolic Panel; Future  -     Lipid Panel; Future  -     TSH+Free T4; Future    10. Mixed hyperlipidemia  -     CBC & Differential; Future  -     Comprehensive Metabolic Panel; Future  -     Lipid Panel; Future  -     TSH+Free T4; Future    11. Essential hypertension  -     CBC & Differential; Future  -     Comprehensive Metabolic Panel; Future  -     Lipid Panel; Future  -     TSH+Free T4; Future        Abdominal pains, right flank pain, ---CT scan unremarkable February 2022,    CKD stage IIIa, creatinine 1.5-1.6 over the past 6 months--seeing dr hernández now aug 2022    Coronary artery disease previous stent placement 2007, continues aspirin,    Insomnia, continues on Elavil 75 mg tablet ---half tablet at bedtime,    Hypertension continues on Norvasc 10 mg daily, irbesartan 300 mg daily    Lower extremity edema ---continues Lasix 20 mg daily    Vitamin D and B12 deficiency continues replacement    Hyperlipidemia continues on Lipitor 80 mg daily,, Vascepa 1 g 2 tablets twice a day,    Hypothyroidism continues on levothyroxine 50 mcg daily    Paroxysmal atrial fibrillation, continues on sotalol 40 mg twice a day    Mild thrombocytopenia, chronic, stable at 109, will follow-up again in 3 months, as of August 2022    psa =0.24 oct 2021, dr olivarez prostate CA, seed implants, probably approximately 2012,    Overweight, discussed weight loss efforts,      Follow Up   Return in about 6 months (around 2/15/2023).  Patient was given instructions and counseling regarding his condition or for health maintenance advice. Please see specific information pulled into the AVS if appropriate.

## 2022-08-16 ENCOUNTER — APPOINTMENT (OUTPATIENT)
Dept: CARDIAC REHAB | Facility: HOSPITAL | Age: 82
End: 2022-08-16

## 2022-08-18 ENCOUNTER — APPOINTMENT (OUTPATIENT)
Dept: CARDIAC REHAB | Facility: HOSPITAL | Age: 82
End: 2022-08-18

## 2022-08-18 ENCOUNTER — OFFICE VISIT (OUTPATIENT)
Dept: CARDIAC REHAB | Facility: HOSPITAL | Age: 82
End: 2022-08-18

## 2022-08-18 VITALS
WEIGHT: 242.95 LBS | BODY MASS INDEX: 32.94 KG/M2 | DIASTOLIC BLOOD PRESSURE: 70 MMHG | OXYGEN SATURATION: 96 % | HEART RATE: 76 BPM | SYSTOLIC BLOOD PRESSURE: 136 MMHG

## 2022-08-18 DIAGNOSIS — Z95.5 HISTORY OF HEART ARTERY STENT: Primary | ICD-10-CM

## 2022-08-22 ENCOUNTER — OFFICE VISIT (OUTPATIENT)
Dept: CARDIAC REHAB | Facility: HOSPITAL | Age: 82
End: 2022-08-22

## 2022-08-22 VITALS
HEART RATE: 66 BPM | BODY MASS INDEX: 32.64 KG/M2 | WEIGHT: 240.74 LBS | SYSTOLIC BLOOD PRESSURE: 110 MMHG | OXYGEN SATURATION: 96 % | DIASTOLIC BLOOD PRESSURE: 70 MMHG

## 2022-08-22 DIAGNOSIS — Z95.5 HISTORY OF HEART ARTERY STENT: Primary | ICD-10-CM

## 2022-08-23 ENCOUNTER — APPOINTMENT (OUTPATIENT)
Dept: CARDIAC REHAB | Facility: HOSPITAL | Age: 82
End: 2022-08-23

## 2022-08-25 ENCOUNTER — APPOINTMENT (OUTPATIENT)
Dept: CARDIAC REHAB | Facility: HOSPITAL | Age: 82
End: 2022-08-25

## 2022-08-25 ENCOUNTER — OFFICE VISIT (OUTPATIENT)
Dept: CARDIAC REHAB | Facility: HOSPITAL | Age: 82
End: 2022-08-25

## 2022-08-25 VITALS
HEART RATE: 64 BPM | SYSTOLIC BLOOD PRESSURE: 140 MMHG | OXYGEN SATURATION: 96 % | BODY MASS INDEX: 32.94 KG/M2 | WEIGHT: 242.95 LBS | DIASTOLIC BLOOD PRESSURE: 62 MMHG

## 2022-08-25 DIAGNOSIS — Z95.5 HISTORY OF HEART ARTERY STENT: Primary | ICD-10-CM

## 2022-08-29 ENCOUNTER — OFFICE VISIT (OUTPATIENT)
Dept: CARDIAC REHAB | Facility: HOSPITAL | Age: 82
End: 2022-08-29

## 2022-08-29 ENCOUNTER — OFFICE VISIT (OUTPATIENT)
Dept: CARDIOLOGY | Facility: CLINIC | Age: 82
End: 2022-08-29

## 2022-08-29 VITALS
SYSTOLIC BLOOD PRESSURE: 126 MMHG | WEIGHT: 242 LBS | BODY MASS INDEX: 32.78 KG/M2 | HEIGHT: 72 IN | HEART RATE: 61 BPM | DIASTOLIC BLOOD PRESSURE: 72 MMHG

## 2022-08-29 VITALS
DIASTOLIC BLOOD PRESSURE: 80 MMHG | WEIGHT: 243.39 LBS | BODY MASS INDEX: 33 KG/M2 | SYSTOLIC BLOOD PRESSURE: 136 MMHG | OXYGEN SATURATION: 97 % | HEART RATE: 63 BPM

## 2022-08-29 DIAGNOSIS — E78.2 MIXED HYPERLIPIDEMIA: ICD-10-CM

## 2022-08-29 DIAGNOSIS — Z95.5 HISTORY OF HEART ARTERY STENT: Primary | ICD-10-CM

## 2022-08-29 DIAGNOSIS — N18.32 STAGE 3B CHRONIC KIDNEY DISEASE: ICD-10-CM

## 2022-08-29 DIAGNOSIS — I48.0 PAROXYSMAL ATRIAL FIBRILLATION: ICD-10-CM

## 2022-08-29 DIAGNOSIS — Z79.899 MEDICATION MANAGEMENT: Primary | ICD-10-CM

## 2022-08-29 DIAGNOSIS — I25.10 CORONARY ARTERY DISEASE INVOLVING NATIVE CORONARY ARTERY OF NATIVE HEART WITHOUT ANGINA PECTORIS: ICD-10-CM

## 2022-08-29 DIAGNOSIS — I10 HYPERTENSION, ESSENTIAL: ICD-10-CM

## 2022-08-29 PROCEDURE — 99214 OFFICE O/P EST MOD 30 MIN: CPT | Performed by: INTERNAL MEDICINE

## 2022-08-29 PROCEDURE — 93000 ELECTROCARDIOGRAM COMPLETE: CPT | Performed by: INTERNAL MEDICINE

## 2022-08-29 RX ORDER — FUROSEMIDE 20 MG/1
20 TABLET ORAL DAILY
Qty: 90 TABLET | Refills: 3 | Status: SHIPPED | OUTPATIENT
Start: 2022-08-29

## 2022-08-29 NOTE — PROGRESS NOTES
Office Visit    Chief Complaint  Hypertension, Hyperlipidemia, and Atrial Fibrillation    Subjective            Michael Sol presents to [facility] CARD[facility]  He has coronary disease and is status post PCI to the LAD in 2007.  He has not had any episodes of angina in the last 1 year.  He had atrial fibrillation many years ago and was chemically cardioverted with sotalol.  He has been on sotalol ever since.  He was on apixaban but was taken off it more than 5 years ago.  He had a negative monitor study for 30 days prior to taking off  the apixaban.  He has not had any recurrence of his A. fib.  He denies chest pain shortness of palpitations dizziness or syncope      Past Medical History:   Diagnosis Date   • Atrial fibrillation (HCC)    • Benign prostatic hyperplasia without lower urinary tract symptoms    • Chronic kidney disease, stage 3 (HCC)    • Essential (primary) hypertension    • Hyperlipidemia, unspecified    • Hypothyroidism, unspecified    • Myocardial infarction (HCC)    • Vitamin D deficiency, unspecified        Allergies   Allergen Reactions   • Lisinopril Cough        Past Surgical History:   Procedure Laterality Date   • CARDIAC CATHETERIZATION     • CORONARY STENT PLACEMENT          Social History     Tobacco Use   • Smoking status: Former Smoker   • Smokeless tobacco: Never Used   Vaping Use   • Vaping Use: Never used   Substance Use Topics   • Alcohol use: Yes   • Drug use: Never       History reviewed. No pertinent family history.     Prior to Admission medications    Medication Sig Start Date End Date Taking? Authorizing Provider   amitriptyline (ELAVIL) 75 MG tablet Take 37.5 mg by mouth Every Night.   Yes ProviderKourtney MD   amLODIPine (NORVASC) 10 MG tablet Take 10 mg by mouth Daily.   Yes ProviderKourtney MD   aspirin 81 MG EC tablet Take 81 mg by mouth Daily.   Yes ProviderKourtney MD   atorvastatin (LIPITOR) 80 MG tablet Take 1 tablet by mouth  "Daily. 12/2/21  Yes Jere Brar MD   cholecalciferol (VITAMIN D3) 25 MCG (1000 UT) tablet Take 1,000 Units by mouth Daily.   Yes Kourtney Major MD   cyanocobalamin 1000 MCG/ML injection Inject 1,000 mcg into the appropriate muscle as directed by prescriber Every 28 (Twenty-Eight) Days.   Yes Kourtney Major MD   ezetimibe (ZETIA) 10 MG tablet Take 1 tablet by mouth Daily. 3/8/22  Yes Jere Brar MD   furosemide (LASIX) 20 MG tablet Take 20 mg by mouth Daily. 3/10/21  Yes Kourtney Major MD   hyoscyamine (LEVSIN) 0.125 MG SL tablet Take 1 tablet by mouth Every 4 (Four) Hours As Needed for Cramping. 2/16/22  Yes Derrick Garcia MD   irbesartan (AVAPRO) 300 MG tablet Take 1 tablet by mouth Every Night. 12/1/21  Yes Jere Brar MD   levothyroxine (SYNTHROID, LEVOTHROID) 50 MCG tablet Take 50 mcg by mouth Daily.   Yes Kourtney Major MD   pantoprazole (Protonix) 40 MG EC tablet Take 1 tablet by mouth Daily. 1/25/22  Yes Pablo Salazar MD   sotalol (BETAPACE) 80 MG tablet Take 40 mg by mouth 2 (Two) Times a Day.   Yes Kourtney Major MD   spironolactone (ALDACTONE) 25 MG tablet Take 25 mg by mouth. 1/31/22 1/31/23 Yes Kourtney Major MD   Vascepa 1 g capsule capsule Take 2 capsules by mouth 2 (Two) Times a Day. 7/25/21  Yes Kourtney Major MD        Review of Systems   Constitutional: Negative for fatigue.   Respiratory: Negative for cough and shortness of breath.    Cardiovascular: Negative for chest pain, palpitations and leg swelling.   Neurological: Negative for dizziness.        Objective     /72   Pulse 61   Ht 182.9 cm (72\")   Wt 110 kg (242 lb)   BMI 32.82 kg/m²       Physical Exam  Constitutional:       General: He is awake.      Appearance: Normal appearance.   Neck:      Thyroid: No thyromegaly.      Vascular: No carotid bruit or JVD.   Cardiovascular:      Rate and Rhythm: Normal rate and regular rhythm.      Chest Wall: PMI is " not displaced.      Pulses: Normal pulses.      Heart sounds: Normal heart sounds, S1 normal and S2 normal. No murmur heard.    No friction rub. No gallop. No S3 or S4 sounds.   Pulmonary:      Effort: Pulmonary effort is normal.      Breath sounds: Normal breath sounds and air entry. No wheezing, rhonchi or rales.   Abdominal:      General: Bowel sounds are normal.      Palpations: Abdomen is soft. There is no mass.      Tenderness: There is no abdominal tenderness.   Musculoskeletal:      Cervical back: Neck supple.      Right lower leg: No edema.      Left lower leg: No edema.   Neurological:      Mental Status: He is alert and oriented to person, place, and time.   Psychiatric:         Mood and Affect: Mood normal.         Behavior: Behavior is cooperative.           Result Review :                    ECG 12 Lead    Date/Time: 8/29/2022 8:57 AM  Performed by: Jere Brar MD  Authorized by: Jere Brar MD   Comments: Sinus rhythm  left anterior fascicular block..  QTC is 418 ms.  No other abnormality noted.  No change compared to EKG of December 2021                Assessment and Plan        Diagnoses and all orders for this visit:    1. Paroxysmal atrial fibrillation (HCC) (Primary)  Assessment & Plan:  Paroxysmal atrial fibrillation.  No episodes for many years.  Has been off his anticoagulation for greater than 5 years and has not had any recurrence on sotalol.  We will continue sotalol in the current dosage      2. Coronary artery disease involving native coronary artery of native heart without angina pectoris  Assessment & Plan:  He has coronary disease and is status post PCI to the LAD in 2007.  He has not had any episodes of angina in the last 1 year.  He had atrial fibrillation many years ago and was chemically cardioverted with sotalol.  He has been on sotalol ever since.  He was on apixaban but was taken off it more than 5 years ago.  He had a negative monitor study for 30 days prior to taking  off  the apixaban.  He has not had any recurrence of his A. fib      3. Mixed hyperlipidemia  Assessment & Plan:  His lipids have come under control with a combination of atorvastatin and ezetimibe.  He will continue the same.      4. Hypertension, essential  Assessment & Plan:  Hypertension is controlled on irbesartan, furosemide, spironolactone and amlodipine.  He will continue all 4 in the current dosage      5. Stage 3b chronic kidney disease (HCC)  Assessment & Plan:  Creatinine is slightly worse compared to 6 months ago.  I will have him omit his furosemide on Sundays and have him take it only 6 days a week.  Encouraged him to increase his fluid intake.  He will continue to follow-up with Dr. Higgins      Other orders  -     furosemide (LASIX) 20 MG tablet; Take 1 tablet by mouth Daily. 1 tab six days a week.  Dispense: 90 tablet; Refill: 3  -     ECG 12 Lead          Follow Up     Return in about 9 months (around 5/29/2023) for EKG with next office visit.  fu with Dr. Calero.    Patient was given instructions and counseling regarding his condition or for health maintenance advice. Please see specific information pulled into the AVS if appropriate.     Jere Brar MD  08/29/22 08:31 EDT

## 2022-08-29 NOTE — ASSESSMENT & PLAN NOTE
Creatinine is slightly worse compared to 6 months ago.  I will have him omit his furosemide on Sundays and have him take it only 6 days a week.  Encouraged him to increase his fluid intake.  He will continue to follow-up with Dr. Higgins

## 2022-08-29 NOTE — ASSESSMENT & PLAN NOTE
He has coronary disease and is status post PCI to the LAD in 2007.  He has not had any episodes of angina in the last 1 year.  He had atrial fibrillation many years ago and was chemically cardioverted with sotalol.  He has been on sotalol ever since.  He was on apixaban but was taken off it more than 5 years ago.  He had a negative monitor study for 30 days prior to taking off  the apixaban.  He has not had any recurrence of his A. fib

## 2022-08-29 NOTE — ASSESSMENT & PLAN NOTE
His lipids have come under control with a combination of atorvastatin and ezetimibe.  He will continue the same.

## 2022-08-29 NOTE — ASSESSMENT & PLAN NOTE
Paroxysmal atrial fibrillation.  No episodes for many years.  Has been off his anticoagulation for greater than 5 years and has not had any recurrence on sotalol.  We will continue sotalol in the current dosage

## 2022-08-29 NOTE — ASSESSMENT & PLAN NOTE
Hypertension is controlled on irbesartan, furosemide, spironolactone and amlodipine.  He will continue all 4 in the current dosage

## 2022-08-30 ENCOUNTER — APPOINTMENT (OUTPATIENT)
Dept: CARDIAC REHAB | Facility: HOSPITAL | Age: 82
End: 2022-08-30

## 2022-09-01 ENCOUNTER — OFFICE VISIT (OUTPATIENT)
Dept: CARDIAC REHAB | Facility: HOSPITAL | Age: 82
End: 2022-09-01

## 2022-09-01 ENCOUNTER — APPOINTMENT (OUTPATIENT)
Dept: CARDIAC REHAB | Facility: HOSPITAL | Age: 82
End: 2022-09-01

## 2022-09-01 VITALS
SYSTOLIC BLOOD PRESSURE: 122 MMHG | HEART RATE: 65 BPM | BODY MASS INDEX: 33.01 KG/M2 | DIASTOLIC BLOOD PRESSURE: 78 MMHG | WEIGHT: 243.39 LBS | OXYGEN SATURATION: 95 %

## 2022-09-01 DIAGNOSIS — Z95.5 HISTORY OF HEART ARTERY STENT: Primary | ICD-10-CM

## 2022-09-05 ENCOUNTER — APPOINTMENT (OUTPATIENT)
Dept: CARDIAC REHAB | Facility: HOSPITAL | Age: 82
End: 2022-09-05

## 2022-09-06 ENCOUNTER — OFFICE VISIT (OUTPATIENT)
Dept: CARDIAC REHAB | Facility: HOSPITAL | Age: 82
End: 2022-09-06

## 2022-09-06 VITALS
SYSTOLIC BLOOD PRESSURE: 120 MMHG | BODY MASS INDEX: 32.68 KG/M2 | DIASTOLIC BLOOD PRESSURE: 80 MMHG | OXYGEN SATURATION: 97 % | WEIGHT: 240.96 LBS | HEART RATE: 63 BPM

## 2022-09-06 DIAGNOSIS — Z95.5 HISTORY OF HEART ARTERY STENT: Primary | ICD-10-CM

## 2022-09-08 ENCOUNTER — OFFICE VISIT (OUTPATIENT)
Dept: CARDIAC REHAB | Facility: HOSPITAL | Age: 82
End: 2022-09-08

## 2022-09-08 VITALS
BODY MASS INDEX: 32.95 KG/M2 | HEART RATE: 59 BPM | WEIGHT: 242.95 LBS | OXYGEN SATURATION: 98 % | DIASTOLIC BLOOD PRESSURE: 78 MMHG | SYSTOLIC BLOOD PRESSURE: 130 MMHG

## 2022-09-08 DIAGNOSIS — Z95.5 HISTORY OF HEART ARTERY STENT: Primary | ICD-10-CM

## 2022-09-12 ENCOUNTER — APPOINTMENT (OUTPATIENT)
Dept: CARDIAC REHAB | Facility: HOSPITAL | Age: 82
End: 2022-09-12

## 2022-09-13 ENCOUNTER — OFFICE VISIT (OUTPATIENT)
Dept: CARDIAC REHAB | Facility: HOSPITAL | Age: 82
End: 2022-09-13

## 2022-09-13 VITALS
DIASTOLIC BLOOD PRESSURE: 80 MMHG | BODY MASS INDEX: 32.47 KG/M2 | SYSTOLIC BLOOD PRESSURE: 114 MMHG | OXYGEN SATURATION: 97 % | HEART RATE: 71 BPM | WEIGHT: 239.42 LBS

## 2022-09-13 DIAGNOSIS — Z95.5 HISTORY OF HEART ARTERY STENT: Primary | ICD-10-CM

## 2022-09-15 ENCOUNTER — OFFICE VISIT (OUTPATIENT)
Dept: CARDIAC REHAB | Facility: HOSPITAL | Age: 82
End: 2022-09-15

## 2022-09-15 VITALS
SYSTOLIC BLOOD PRESSURE: 114 MMHG | HEART RATE: 69 BPM | OXYGEN SATURATION: 97 % | BODY MASS INDEX: 32.98 KG/M2 | DIASTOLIC BLOOD PRESSURE: 76 MMHG | WEIGHT: 243.17 LBS | RESPIRATION RATE: 18 BRPM

## 2022-09-15 DIAGNOSIS — Z95.5 HISTORY OF HEART ARTERY STENT: Primary | ICD-10-CM

## 2022-09-19 ENCOUNTER — APPOINTMENT (OUTPATIENT)
Dept: CARDIAC REHAB | Facility: HOSPITAL | Age: 82
End: 2022-09-19

## 2022-09-20 ENCOUNTER — OFFICE VISIT (OUTPATIENT)
Dept: CARDIAC REHAB | Facility: HOSPITAL | Age: 82
End: 2022-09-20

## 2022-09-20 VITALS
DIASTOLIC BLOOD PRESSURE: 72 MMHG | OXYGEN SATURATION: 95 % | BODY MASS INDEX: 32.74 KG/M2 | HEART RATE: 72 BPM | WEIGHT: 241.4 LBS | SYSTOLIC BLOOD PRESSURE: 130 MMHG

## 2022-09-20 DIAGNOSIS — Z95.5 HISTORY OF HEART ARTERY STENT: Primary | ICD-10-CM

## 2022-09-22 ENCOUNTER — OFFICE VISIT (OUTPATIENT)
Dept: CARDIAC REHAB | Facility: HOSPITAL | Age: 82
End: 2022-09-22

## 2022-09-22 VITALS
OXYGEN SATURATION: 96 % | HEART RATE: 70 BPM | WEIGHT: 242.51 LBS | DIASTOLIC BLOOD PRESSURE: 70 MMHG | SYSTOLIC BLOOD PRESSURE: 110 MMHG | BODY MASS INDEX: 32.89 KG/M2

## 2022-09-22 DIAGNOSIS — Z95.5 HISTORY OF HEART ARTERY STENT: Primary | ICD-10-CM

## 2022-09-27 ENCOUNTER — OFFICE VISIT (OUTPATIENT)
Dept: CARDIAC REHAB | Facility: HOSPITAL | Age: 82
End: 2022-09-27

## 2022-09-27 VITALS
WEIGHT: 242.06 LBS | OXYGEN SATURATION: 99 % | SYSTOLIC BLOOD PRESSURE: 110 MMHG | BODY MASS INDEX: 32.83 KG/M2 | HEART RATE: 65 BPM | DIASTOLIC BLOOD PRESSURE: 70 MMHG

## 2022-09-27 DIAGNOSIS — Z95.5 HISTORY OF HEART ARTERY STENT: Primary | ICD-10-CM

## 2022-09-29 ENCOUNTER — OFFICE VISIT (OUTPATIENT)
Dept: CARDIAC REHAB | Facility: HOSPITAL | Age: 82
End: 2022-09-29

## 2022-09-29 ENCOUNTER — APPOINTMENT (OUTPATIENT)
Dept: CARDIAC REHAB | Facility: HOSPITAL | Age: 82
End: 2022-09-29

## 2022-09-29 VITALS
BODY MASS INDEX: 32.59 KG/M2 | DIASTOLIC BLOOD PRESSURE: 70 MMHG | HEART RATE: 70 BPM | SYSTOLIC BLOOD PRESSURE: 132 MMHG | WEIGHT: 240.3 LBS | OXYGEN SATURATION: 98 %

## 2022-09-29 DIAGNOSIS — Z95.5 HISTORY OF HEART ARTERY STENT: Primary | ICD-10-CM

## 2022-10-04 ENCOUNTER — OFFICE VISIT (OUTPATIENT)
Dept: CARDIAC REHAB | Facility: HOSPITAL | Age: 82
End: 2022-10-04

## 2022-10-04 VITALS — DIASTOLIC BLOOD PRESSURE: 80 MMHG | OXYGEN SATURATION: 95 % | HEART RATE: 75 BPM | SYSTOLIC BLOOD PRESSURE: 120 MMHG

## 2022-10-04 DIAGNOSIS — Z95.5 HISTORY OF HEART ARTERY STENT: Primary | ICD-10-CM

## 2022-10-06 ENCOUNTER — OFFICE VISIT (OUTPATIENT)
Dept: CARDIAC REHAB | Facility: HOSPITAL | Age: 82
End: 2022-10-06

## 2022-10-06 VITALS
BODY MASS INDEX: 33.31 KG/M2 | HEART RATE: 66 BPM | DIASTOLIC BLOOD PRESSURE: 78 MMHG | WEIGHT: 245.59 LBS | SYSTOLIC BLOOD PRESSURE: 164 MMHG

## 2022-10-06 DIAGNOSIS — Z95.5 HISTORY OF HEART ARTERY STENT: Primary | ICD-10-CM

## 2022-10-10 ENCOUNTER — APPOINTMENT (OUTPATIENT)
Dept: CARDIAC REHAB | Facility: HOSPITAL | Age: 82
End: 2022-10-10

## 2022-10-11 ENCOUNTER — OFFICE VISIT (OUTPATIENT)
Dept: CARDIAC REHAB | Facility: HOSPITAL | Age: 82
End: 2022-10-11

## 2022-10-11 VITALS
HEART RATE: 72 BPM | WEIGHT: 244.05 LBS | BODY MASS INDEX: 33.1 KG/M2 | DIASTOLIC BLOOD PRESSURE: 60 MMHG | OXYGEN SATURATION: 97 % | SYSTOLIC BLOOD PRESSURE: 122 MMHG

## 2022-10-11 DIAGNOSIS — Z95.5 HISTORY OF HEART ARTERY STENT: Primary | ICD-10-CM

## 2022-10-13 ENCOUNTER — OFFICE VISIT (OUTPATIENT)
Dept: CARDIAC REHAB | Facility: HOSPITAL | Age: 82
End: 2022-10-13

## 2022-10-13 VITALS
BODY MASS INDEX: 33.34 KG/M2 | DIASTOLIC BLOOD PRESSURE: 68 MMHG | HEART RATE: 68 BPM | WEIGHT: 245.81 LBS | SYSTOLIC BLOOD PRESSURE: 124 MMHG | OXYGEN SATURATION: 95 %

## 2022-10-13 DIAGNOSIS — Z95.5 HISTORY OF HEART ARTERY STENT: Primary | ICD-10-CM

## 2022-10-17 ENCOUNTER — APPOINTMENT (OUTPATIENT)
Dept: CARDIAC REHAB | Facility: HOSPITAL | Age: 82
End: 2022-10-17

## 2022-10-18 ENCOUNTER — OFFICE VISIT (OUTPATIENT)
Dept: CARDIAC REHAB | Facility: HOSPITAL | Age: 82
End: 2022-10-18

## 2022-10-18 VITALS
WEIGHT: 244.27 LBS | DIASTOLIC BLOOD PRESSURE: 72 MMHG | SYSTOLIC BLOOD PRESSURE: 124 MMHG | OXYGEN SATURATION: 97 % | BODY MASS INDEX: 33.13 KG/M2 | HEART RATE: 68 BPM

## 2022-10-18 DIAGNOSIS — Z95.5 HISTORY OF HEART ARTERY STENT: Primary | ICD-10-CM

## 2022-10-20 ENCOUNTER — OFFICE VISIT (OUTPATIENT)
Dept: CARDIAC REHAB | Facility: HOSPITAL | Age: 82
End: 2022-10-20

## 2022-10-20 VITALS
DIASTOLIC BLOOD PRESSURE: 70 MMHG | BODY MASS INDEX: 33.22 KG/M2 | SYSTOLIC BLOOD PRESSURE: 126 MMHG | WEIGHT: 244.93 LBS | HEART RATE: 66 BPM | OXYGEN SATURATION: 97 %

## 2022-10-20 DIAGNOSIS — Z95.5 HISTORY OF HEART ARTERY STENT: Primary | ICD-10-CM

## 2022-10-24 ENCOUNTER — TELEPHONE (OUTPATIENT)
Dept: UROLOGY | Facility: CLINIC | Age: 82
End: 2022-10-24

## 2022-10-24 DIAGNOSIS — Z85.46 HISTORY OF PROSTATE CANCER: Primary | ICD-10-CM

## 2022-10-24 NOTE — TELEPHONE ENCOUNTER
Caller: KVNG NIELSON    Relationship: SELF    Best call back number: [phone]    What orders are you requesting (i.e. lab or imaging): PSA    In what timeframe would the patient need to come in: PRIOR TO APPT 1/23/23    Where will you receive your lab/imaging services:     Additional notes: CURRENT PSA ORDER EXPIRES IN NOV 2022 SINCE ANNUAL WAS DUE IN OCT 2022 BUT NEXT AVAILABLE APPT IS NOT UNTIL JAN 2023 SO PT WILL NEED PSA ORDER EXTENDED TO BE COMPLETED CLOSER TO APPT.

## 2022-10-25 ENCOUNTER — OFFICE VISIT (OUTPATIENT)
Dept: CARDIAC REHAB | Facility: HOSPITAL | Age: 82
End: 2022-10-25

## 2022-10-25 VITALS
HEART RATE: 65 BPM | WEIGHT: 243.39 LBS | SYSTOLIC BLOOD PRESSURE: 110 MMHG | DIASTOLIC BLOOD PRESSURE: 78 MMHG | BODY MASS INDEX: 33.01 KG/M2 | OXYGEN SATURATION: 97 %

## 2022-10-25 DIAGNOSIS — Z95.5 HISTORY OF HEART ARTERY STENT: Primary | ICD-10-CM

## 2022-10-27 ENCOUNTER — OFFICE VISIT (OUTPATIENT)
Dept: CARDIAC REHAB | Facility: HOSPITAL | Age: 82
End: 2022-10-27

## 2022-10-27 VITALS
DIASTOLIC BLOOD PRESSURE: 70 MMHG | SYSTOLIC BLOOD PRESSURE: 118 MMHG | OXYGEN SATURATION: 97 % | WEIGHT: 244.93 LBS | BODY MASS INDEX: 33.22 KG/M2

## 2022-10-27 DIAGNOSIS — Z95.5 HISTORY OF HEART ARTERY STENT: Primary | ICD-10-CM

## 2022-10-31 ENCOUNTER — APPOINTMENT (OUTPATIENT)
Dept: CARDIAC REHAB | Facility: HOSPITAL | Age: 82
End: 2022-10-31

## 2022-11-01 ENCOUNTER — OFFICE VISIT (OUTPATIENT)
Dept: CARDIAC REHAB | Facility: HOSPITAL | Age: 82
End: 2022-11-01

## 2022-11-01 VITALS
BODY MASS INDEX: 33.22 KG/M2 | OXYGEN SATURATION: 97 % | SYSTOLIC BLOOD PRESSURE: 116 MMHG | DIASTOLIC BLOOD PRESSURE: 82 MMHG | WEIGHT: 244.93 LBS | HEART RATE: 69 BPM

## 2022-11-01 DIAGNOSIS — Z95.5 HISTORY OF HEART ARTERY STENT: Primary | ICD-10-CM

## 2022-11-03 ENCOUNTER — OFFICE VISIT (OUTPATIENT)
Dept: CARDIAC REHAB | Facility: HOSPITAL | Age: 82
End: 2022-11-03

## 2022-11-03 VITALS
OXYGEN SATURATION: 97 % | WEIGHT: 247.14 LBS | HEART RATE: 65 BPM | SYSTOLIC BLOOD PRESSURE: 118 MMHG | BODY MASS INDEX: 33.52 KG/M2 | DIASTOLIC BLOOD PRESSURE: 70 MMHG

## 2022-11-03 DIAGNOSIS — Z95.5 HISTORY OF HEART ARTERY STENT: Primary | ICD-10-CM

## 2022-11-07 ENCOUNTER — APPOINTMENT (OUTPATIENT)
Dept: CARDIAC REHAB | Facility: HOSPITAL | Age: 82
End: 2022-11-07

## 2022-11-08 ENCOUNTER — OFFICE VISIT (OUTPATIENT)
Dept: CARDIAC REHAB | Facility: HOSPITAL | Age: 82
End: 2022-11-08

## 2022-11-08 DIAGNOSIS — Z95.5 HISTORY OF HEART ARTERY STENT: Primary | ICD-10-CM

## 2022-11-10 ENCOUNTER — OFFICE VISIT (OUTPATIENT)
Dept: CARDIAC REHAB | Facility: HOSPITAL | Age: 82
End: 2022-11-10

## 2022-11-10 VITALS
WEIGHT: 245.81 LBS | SYSTOLIC BLOOD PRESSURE: 138 MMHG | BODY MASS INDEX: 33.34 KG/M2 | HEART RATE: 64 BPM | DIASTOLIC BLOOD PRESSURE: 78 MMHG | OXYGEN SATURATION: 97 %

## 2022-11-10 DIAGNOSIS — Z95.5 HISTORY OF HEART ARTERY STENT: Primary | ICD-10-CM

## 2022-11-14 ENCOUNTER — APPOINTMENT (OUTPATIENT)
Dept: CARDIAC REHAB | Facility: HOSPITAL | Age: 82
End: 2022-11-14

## 2022-11-17 ENCOUNTER — OFFICE VISIT (OUTPATIENT)
Dept: CARDIAC REHAB | Facility: HOSPITAL | Age: 82
End: 2022-11-17

## 2022-11-17 VITALS
WEIGHT: 244.71 LBS | OXYGEN SATURATION: 97 % | BODY MASS INDEX: 33.19 KG/M2 | SYSTOLIC BLOOD PRESSURE: 122 MMHG | DIASTOLIC BLOOD PRESSURE: 78 MMHG | HEART RATE: 69 BPM

## 2022-11-17 DIAGNOSIS — Z95.5 HISTORY OF HEART ARTERY STENT: Primary | ICD-10-CM

## 2022-11-21 ENCOUNTER — APPOINTMENT (OUTPATIENT)
Dept: CARDIAC REHAB | Facility: HOSPITAL | Age: 82
End: 2022-11-21

## 2022-11-22 ENCOUNTER — OFFICE VISIT (OUTPATIENT)
Dept: CARDIAC REHAB | Facility: HOSPITAL | Age: 82
End: 2022-11-22

## 2022-11-22 VITALS
BODY MASS INDEX: 33.16 KG/M2 | DIASTOLIC BLOOD PRESSURE: 80 MMHG | HEART RATE: 69 BPM | WEIGHT: 244.49 LBS | SYSTOLIC BLOOD PRESSURE: 140 MMHG

## 2022-11-22 DIAGNOSIS — Z95.5 HISTORY OF HEART ARTERY STENT: Primary | ICD-10-CM

## 2022-11-24 ENCOUNTER — APPOINTMENT (OUTPATIENT)
Dept: CARDIAC REHAB | Facility: HOSPITAL | Age: 82
End: 2022-11-24

## 2022-11-28 ENCOUNTER — APPOINTMENT (OUTPATIENT)
Dept: CARDIAC REHAB | Facility: HOSPITAL | Age: 82
End: 2022-11-28

## 2022-11-29 ENCOUNTER — OFFICE VISIT (OUTPATIENT)
Dept: CARDIAC REHAB | Facility: HOSPITAL | Age: 82
End: 2022-11-29

## 2022-11-29 VITALS
BODY MASS INDEX: 33.46 KG/M2 | OXYGEN SATURATION: 100 % | HEART RATE: 60 BPM | SYSTOLIC BLOOD PRESSURE: 150 MMHG | WEIGHT: 246.69 LBS | DIASTOLIC BLOOD PRESSURE: 70 MMHG

## 2022-11-29 DIAGNOSIS — Z95.5 HISTORY OF HEART ARTERY STENT: Primary | ICD-10-CM

## 2022-11-30 ENCOUNTER — HOSPITAL ENCOUNTER (OUTPATIENT)
Dept: GENERAL RADIOLOGY | Facility: HOSPITAL | Age: 82
Discharge: HOME | End: 2022-11-30

## 2022-11-30 ENCOUNTER — OFFICE VISIT (OUTPATIENT)
Dept: INTERNAL MEDICINE | Facility: CLINIC | Age: 82
End: 2022-11-30

## 2022-11-30 VITALS
SYSTOLIC BLOOD PRESSURE: 120 MMHG | RESPIRATION RATE: 18 BRPM | OXYGEN SATURATION: 97 % | TEMPERATURE: 97.2 F | BODY MASS INDEX: 33.18 KG/M2 | WEIGHT: 245 LBS | HEIGHT: 72 IN | HEART RATE: 74 BPM | DIASTOLIC BLOOD PRESSURE: 78 MMHG

## 2022-11-30 DIAGNOSIS — R10.9 RT FLANK PAIN: Primary | ICD-10-CM

## 2022-11-30 DIAGNOSIS — R10.9 RT FLANK PAIN: ICD-10-CM

## 2022-11-30 DIAGNOSIS — R10.11 RIGHT UPPER QUADRANT ABDOMINAL PAIN: ICD-10-CM

## 2022-11-30 DIAGNOSIS — R07.82 INTERCOSTAL PAIN: ICD-10-CM

## 2022-11-30 LAB
ALBUMIN SERPL-MCNC: 4.6 G/DL (ref 3.5–5.2)
ALBUMIN/GLOB SERPL: 1.8 G/DL
ALP SERPL-CCNC: 71 U/L (ref 39–117)
ALT SERPL W P-5'-P-CCNC: 26 U/L (ref 1–41)
AMYLASE SERPL-CCNC: 46 U/L (ref 28–100)
ANION GAP SERPL CALCULATED.3IONS-SCNC: 14.3 MMOL/L (ref 5–15)
AST SERPL-CCNC: 22 U/L (ref 1–40)
BASOPHILS # BLD AUTO: 0.04 10*3/MM3 (ref 0–0.2)
BASOPHILS NFR BLD AUTO: 0.7 % (ref 0–1.5)
BILIRUB SERPL-MCNC: 0.5 MG/DL (ref 0–1.2)
BILIRUB UR QL STRIP: NEGATIVE
BUN SERPL-MCNC: 24 MG/DL (ref 8–23)
BUN/CREAT SERPL: 13 (ref 7–25)
CALCIUM SPEC-SCNC: 9.9 MG/DL (ref 8.6–10.5)
CHLORIDE SERPL-SCNC: 98 MMOL/L (ref 98–107)
CLARITY UR: CLEAR
CO2 SERPL-SCNC: 27.7 MMOL/L (ref 22–29)
COLOR UR: YELLOW
CREAT SERPL-MCNC: 1.84 MG/DL (ref 0.76–1.27)
DEPRECATED RDW RBC AUTO: 40.9 FL (ref 37–54)
EGFRCR SERPLBLD CKD-EPI 2021: 36.2 ML/MIN/1.73
EOSINOPHIL # BLD AUTO: 0.3 10*3/MM3 (ref 0–0.4)
EOSINOPHIL NFR BLD AUTO: 5.1 % (ref 0.3–6.2)
ERYTHROCYTE [DISTWIDTH] IN BLOOD BY AUTOMATED COUNT: 12.7 % (ref 12.3–15.4)
ERYTHROCYTE [SEDIMENTATION RATE] IN BLOOD: 3 MM/HR (ref 0–20)
GLOBULIN UR ELPH-MCNC: 2.6 GM/DL
GLUCOSE SERPL-MCNC: 116 MG/DL (ref 65–99)
GLUCOSE UR STRIP-MCNC: NEGATIVE MG/DL
HCT VFR BLD AUTO: 40 % (ref 37.5–51)
HGB BLD-MCNC: 14.1 G/DL (ref 13–17.7)
HGB UR QL STRIP.AUTO: NEGATIVE
IMM GRANULOCYTES # BLD AUTO: 0.03 10*3/MM3 (ref 0–0.05)
IMM GRANULOCYTES NFR BLD AUTO: 0.5 % (ref 0–0.5)
KETONES UR QL STRIP: NEGATIVE
LEUKOCYTE ESTERASE UR QL STRIP.AUTO: NEGATIVE
LIPASE SERPL-CCNC: 53 U/L (ref 13–60)
LYMPHOCYTES # BLD AUTO: 1.49 10*3/MM3 (ref 0.7–3.1)
LYMPHOCYTES NFR BLD AUTO: 25.1 % (ref 19.6–45.3)
MCH RBC QN AUTO: 31.2 PG (ref 26.6–33)
MCHC RBC AUTO-ENTMCNC: 35.3 G/DL (ref 31.5–35.7)
MCV RBC AUTO: 88.5 FL (ref 79–97)
MONOCYTES # BLD AUTO: 0.57 10*3/MM3 (ref 0.1–0.9)
MONOCYTES NFR BLD AUTO: 9.6 % (ref 5–12)
NEUTROPHILS NFR BLD AUTO: 3.51 10*3/MM3 (ref 1.7–7)
NEUTROPHILS NFR BLD AUTO: 59 % (ref 42.7–76)
NITRITE UR QL STRIP: NEGATIVE
NRBC BLD AUTO-RTO: 0 /100 WBC (ref 0–0.2)
PH UR STRIP.AUTO: 6 [PH] (ref 5–8)
PLATELET # BLD AUTO: 131 10*3/MM3 (ref 140–450)
PMV BLD AUTO: 10.4 FL (ref 6–12)
POTASSIUM SERPL-SCNC: 4.5 MMOL/L (ref 3.5–5.2)
PROT SERPL-MCNC: 7.2 G/DL (ref 6–8.5)
PROT UR QL STRIP: NEGATIVE
RBC # BLD AUTO: 4.52 10*6/MM3 (ref 4.14–5.8)
SODIUM SERPL-SCNC: 140 MMOL/L (ref 136–145)
SP GR UR STRIP: 1.01 (ref 1–1.03)
UROBILINOGEN UR QL STRIP: NORMAL
WBC NRBC COR # BLD: 5.94 10*3/MM3 (ref 3.4–10.8)

## 2022-11-30 PROCEDURE — 71046 X-RAY EXAM CHEST 2 VIEWS: CPT

## 2022-11-30 PROCEDURE — 83690 ASSAY OF LIPASE: CPT | Performed by: INTERNAL MEDICINE

## 2022-11-30 PROCEDURE — 82150 ASSAY OF AMYLASE: CPT | Performed by: INTERNAL MEDICINE

## 2022-11-30 PROCEDURE — 81003 URINALYSIS AUTO W/O SCOPE: CPT | Performed by: INTERNAL MEDICINE

## 2022-11-30 PROCEDURE — 99214 OFFICE O/P EST MOD 30 MIN: CPT | Performed by: INTERNAL MEDICINE

## 2022-11-30 PROCEDURE — 85652 RBC SED RATE AUTOMATED: CPT | Performed by: INTERNAL MEDICINE

## 2022-11-30 PROCEDURE — 85025 COMPLETE CBC W/AUTO DIFF WBC: CPT | Performed by: INTERNAL MEDICINE

## 2022-11-30 PROCEDURE — 80053 COMPREHEN METABOLIC PANEL: CPT | Performed by: INTERNAL MEDICINE

## 2022-11-30 PROCEDURE — 71100 X-RAY EXAM RIBS UNI 2 VIEWS: CPT

## 2022-11-30 PROCEDURE — 36415 COLL VENOUS BLD VENIPUNCTURE: CPT | Performed by: INTERNAL MEDICINE

## 2022-11-30 NOTE — PROGRESS NOTES
"Chief Complaint/ HPI: Patient states for the past couple days he has noticed right flank pain coming around to the front says it sore in 1 area hurts to twist or move in certain areas he is not noticed a rash no nausea or vomiting no blood in his stools, no recall of trauma          Objective   Vital Signs  Vitals:    11/30/22 0914   BP: 120/78   BP Location: Left arm   Patient Position: Sitting   Cuff Size: Adult   Pulse: 74   Resp: 18   Temp: 97.2 °F (36.2 °C)   TempSrc: Temporal   SpO2: 97%   Weight: 111 kg (245 lb)   Height: 182.9 cm (72.01\")      Body mass index is 33.22 kg/m².  Review of Systems as above  Physical Exam patient has no skin lesions on the right flank he is tender in the right posterior flank rib area, no lateral compression tenderness abdomen is soft nontender no liver edge lungs are clear laterally and anteriorly cardiac exam regular rhythm he is ambulatory alert and oriented x3,  Result Review :   Lab Results   Component Value Date    PROBNP 30.7 09/14/2021    PROBNP 17.7 06/23/2021     CMP    CMP 3/7/22 6/13/22 8/15/22   Glucose 127 (A) 101 (A) 112 (A)   BUN 28 (A) 26 (A) 21   Creatinine 1.53 (A) 1.69 (A) 1.73 (A)   Sodium 140 136 137   Potassium 4.6 5.0 4.5   Chloride 105 103 100   Calcium 9.2 9.6 9.1   Albumin 4.20 4.40 4.30   Total Bilirubin 0.5 0.6 0.4   Alkaline Phosphatase 65 57 91   AST (SGOT) 20 17 20   ALT (SGPT) 18 21 16   (A) Abnormal value            CBC w/diff    CBC w/Diff 1/25/22 3/7/22 8/15/22   WBC 6.02 6.07 6.17   RBC 4.73 4.14 4.32   Hemoglobin 14.6 13.0 13.8   Hematocrit 42.6 38.2 39.7   MCV 90.1 92.3 91.9   MCH 30.9 31.4 31.9   MCHC 34.3 34.0 34.8   RDW 12.9 13.2 12.9   Platelets 138 (A) 130 (A) 109 (A)   Neutrophil Rel % 55.0 54.2 56.5   Immature Granulocyte Rel % 0.3 0.3 0.2   Lymphocyte Rel % 30.9 28.0 20.6   Monocyte Rel % 9.3 9.4 13.5 (A)   Eosinophil Rel % 3.7 7.4 (A) 8.6 (A)   Basophil Rel % 0.8 0.7 0.6   (A) Abnormal value             Lipid Panel    Lipid Panel " 3/7/22 6/13/22 8/15/22   Total Cholesterol 173 125 108   Triglycerides 306 (A) 188 (A) 154 (A)   HDL Cholesterol 40 39 (A) 34 (A)   VLDL Cholesterol 50 (A) 31 26   LDL Cholesterol  83 55 48   LDL/HDL Ratio 1.80 1.24 1.27   (A) Abnormal value             Lab Results   Component Value Date    TSH 3.150 08/15/2022    TSH 3.890 03/07/2022    TSH 2.300 11/29/2021      Lab Results   Component Value Date    FREET4 1.36 08/15/2022    FREET4 1.25 03/07/2022    FREET4 1.39 11/29/2021      A1C Last 3 Results    HGBA1C Last 3 Results 3/7/22   Hemoglobin A1C 5.70 (A)   (A) Abnormal value                              Visit Diagnoses:    ICD-10-CM ICD-9-CM   1. Rt flank pain  R10.9 789.09   2. Right upper quadrant abdominal pain  R10.11 789.01   3. Intercostal pain  R07.82 786.59       Assessment and Plan   Diagnoses and all orders for this visit:    1. Rt flank pain (Primary)  -     CBC & Differential; Future  -     Comprehensive Metabolic Panel; Future  -     Amylase; Future  -     Lipase; Future  -     Sedimentation Rate; Future  -     XR Chest PA & Lateral; Future  -     XR Ribs Right With PA Chest; Future  -     Urinalysis With Culture If Indicated -; Future  -     CT Abdomen Pelvis Stone Protocol; Future    2. Right upper quadrant abdominal pain  -     CBC & Differential; Future  -     Comprehensive Metabolic Panel; Future  -     Amylase; Future  -     Lipase; Future  -     Sedimentation Rate; Future  -     XR Chest PA & Lateral; Future  -     XR Ribs Right With PA Chest; Future  -     Urinalysis With Culture If Indicated -; Future  -     CT Abdomen Pelvis Stone Protocol; Future    3. Intercostal pain    Right flank pain, previous CT scan as below, will repeat CT scan x-rays of his ribs and chest will be done November 30, 2022 tell him to watch for signs of shingles, treatment options with anti-inflammatories, pain medications discussed,    Abdominal pains, right flank pain, ---CT scan unremarkable February 2022,    CKD  stage IIIa, creatinine 1.5-1.6 over the past 6 months--seeing dr hernández now aug 2022    Coronary artery disease previous stent placement 2007, continues aspirin,    Insomnia, continues on Elavil 75 mg tablet ---half tablet at bedtime,    Hypertension continues on Norvasc 10 mg daily, irbesartan 300 mg daily    Lower extremity edema ---continues Lasix 20 mg daily    Vitamin D and B12 deficiency continues replacement    Hyperlipidemia continues on Lipitor 80 mg daily,, Vascepa 1 g 2 tablets twice a day,    Hypothyroidism continues on levothyroxine 50 mcg daily    Paroxysmal atrial fibrillation, continues on sotalol 40 mg twice a day    Mild thrombocytopenia, chronic, stable at 109, will follow-up again in 3 months, as of August 2022    psa =0.24 oct 2021, dr olivarez prostate CA, seed implants, probably approximately 2012,    Overweight, discussed weight loss efforts,            Follow Up   No follow-ups on file.  Patient was given instructions and counseling regarding his condition or for health maintenance advice. Please see specific information pulled into the AVS if appropriate.

## 2022-12-01 ENCOUNTER — APPOINTMENT (OUTPATIENT)
Dept: CARDIAC REHAB | Facility: HOSPITAL | Age: 82
End: 2022-12-01

## 2022-12-02 ENCOUNTER — TELEPHONE (OUTPATIENT)
Dept: INTERNAL MEDICINE | Facility: CLINIC | Age: 82
End: 2022-12-02

## 2022-12-02 NOTE — TELEPHONE ENCOUNTER
Call patient tell him his chest x-ray showed no active disease, and there were no rib fractures    Tell him his lab work was good his liver test were normal, but his kidney function showed a little bit of dehydration and he needs to drink more fluids    Tell him his inflammation markers were very good and his urine was clear    Tell him his pancreas levels were normal his white count was normal and his blood counts were normal and platelet count was stable    Tell him he should hear from us when we get the results of the CAT scan back

## 2022-12-05 ENCOUNTER — APPOINTMENT (OUTPATIENT)
Dept: CARDIAC REHAB | Facility: HOSPITAL | Age: 82
End: 2022-12-05

## 2022-12-06 ENCOUNTER — OFFICE VISIT (OUTPATIENT)
Dept: CARDIAC REHAB | Facility: HOSPITAL | Age: 82
End: 2022-12-06

## 2022-12-06 VITALS
OXYGEN SATURATION: 96 % | DIASTOLIC BLOOD PRESSURE: 76 MMHG | HEART RATE: 73 BPM | WEIGHT: 245.37 LBS | BODY MASS INDEX: 33.27 KG/M2 | SYSTOLIC BLOOD PRESSURE: 118 MMHG

## 2022-12-06 DIAGNOSIS — Z95.5 HISTORY OF HEART ARTERY STENT: Primary | ICD-10-CM

## 2022-12-08 ENCOUNTER — OFFICE VISIT (OUTPATIENT)
Dept: CARDIAC REHAB | Facility: HOSPITAL | Age: 82
End: 2022-12-08

## 2022-12-08 VITALS
SYSTOLIC BLOOD PRESSURE: 120 MMHG | HEART RATE: 68 BPM | OXYGEN SATURATION: 98 % | BODY MASS INDEX: 33.54 KG/M2 | WEIGHT: 247.36 LBS | DIASTOLIC BLOOD PRESSURE: 70 MMHG

## 2022-12-08 DIAGNOSIS — Z95.5 HISTORY OF HEART ARTERY STENT: Primary | ICD-10-CM

## 2022-12-12 ENCOUNTER — APPOINTMENT (OUTPATIENT)
Dept: CARDIAC REHAB | Facility: HOSPITAL | Age: 82
End: 2022-12-12

## 2022-12-13 ENCOUNTER — OFFICE VISIT (OUTPATIENT)
Dept: CARDIAC REHAB | Facility: HOSPITAL | Age: 82
End: 2022-12-13

## 2022-12-13 VITALS
SYSTOLIC BLOOD PRESSURE: 120 MMHG | BODY MASS INDEX: 33.24 KG/M2 | OXYGEN SATURATION: 97 % | WEIGHT: 245.15 LBS | HEART RATE: 66 BPM | DIASTOLIC BLOOD PRESSURE: 80 MMHG

## 2022-12-13 DIAGNOSIS — Z95.5 HISTORY OF HEART ARTERY STENT: Primary | ICD-10-CM

## 2022-12-15 ENCOUNTER — HOSPITAL ENCOUNTER (OUTPATIENT)
Dept: CT IMAGING | Facility: HOSPITAL | Age: 82
Discharge: HOME | End: 2022-12-15
Admitting: INTERNAL MEDICINE

## 2022-12-15 ENCOUNTER — APPOINTMENT (OUTPATIENT)
Dept: CARDIAC REHAB | Facility: HOSPITAL | Age: 82
End: 2022-12-15

## 2022-12-15 DIAGNOSIS — R10.9 RT FLANK PAIN: ICD-10-CM

## 2022-12-15 DIAGNOSIS — R10.11 RIGHT UPPER QUADRANT ABDOMINAL PAIN: ICD-10-CM

## 2022-12-15 PROCEDURE — 74176 CT ABD & PELVIS W/O CONTRAST: CPT

## 2022-12-16 ENCOUNTER — TELEPHONE (OUTPATIENT)
Dept: INTERNAL MEDICINE | Facility: CLINIC | Age: 82
End: 2022-12-16

## 2022-12-19 ENCOUNTER — APPOINTMENT (OUTPATIENT)
Dept: CARDIAC REHAB | Facility: HOSPITAL | Age: 82
End: 2022-12-19

## 2022-12-20 ENCOUNTER — OFFICE VISIT (OUTPATIENT)
Dept: CARDIAC REHAB | Facility: HOSPITAL | Age: 82
End: 2022-12-20

## 2022-12-20 VITALS
BODY MASS INDEX: 33.24 KG/M2 | HEART RATE: 67 BPM | DIASTOLIC BLOOD PRESSURE: 70 MMHG | WEIGHT: 245.15 LBS | SYSTOLIC BLOOD PRESSURE: 130 MMHG | OXYGEN SATURATION: 98 %

## 2022-12-20 DIAGNOSIS — Z95.5 HISTORY OF HEART ARTERY STENT: Primary | ICD-10-CM

## 2022-12-22 ENCOUNTER — OFFICE VISIT (OUTPATIENT)
Dept: CARDIAC REHAB | Facility: HOSPITAL | Age: 82
End: 2022-12-22

## 2022-12-22 VITALS
DIASTOLIC BLOOD PRESSURE: 78 MMHG | OXYGEN SATURATION: 97 % | SYSTOLIC BLOOD PRESSURE: 120 MMHG | HEART RATE: 63 BPM | BODY MASS INDEX: 33.48 KG/M2 | WEIGHT: 246.91 LBS

## 2022-12-22 DIAGNOSIS — Z95.5 HISTORY OF HEART ARTERY STENT: Primary | ICD-10-CM

## 2022-12-26 ENCOUNTER — APPOINTMENT (OUTPATIENT)
Dept: CARDIAC REHAB | Facility: HOSPITAL | Age: 82
End: 2022-12-26

## 2022-12-27 ENCOUNTER — OFFICE VISIT (OUTPATIENT)
Dept: CARDIAC REHAB | Facility: HOSPITAL | Age: 82
End: 2022-12-27

## 2022-12-27 VITALS
OXYGEN SATURATION: 95 % | SYSTOLIC BLOOD PRESSURE: 122 MMHG | DIASTOLIC BLOOD PRESSURE: 74 MMHG | HEART RATE: 79 BPM | BODY MASS INDEX: 33.12 KG/M2 | WEIGHT: 244.27 LBS

## 2022-12-27 DIAGNOSIS — Z95.5 HISTORY OF HEART ARTERY STENT: Primary | ICD-10-CM

## 2022-12-30 ENCOUNTER — TELEPHONE (OUTPATIENT)
Dept: INTERNAL MEDICINE | Facility: CLINIC | Age: 82
End: 2022-12-30

## 2022-12-30 NOTE — TELEPHONE ENCOUNTER
.Caller: Michael Sol    Relationship: Self    Best call back number: [phone]    What form or medical record are you requesting: MOST RECENT BLOOD WORK, URINALYSIS AND CHEST X-RAY    Who is requesting this form or medical record from you: [facility] FOR [facility]    How would you like to receive the form or medical records (pick-up, mail, fax): PICK    Timeframe paperwork needed: [facility]SA WITH DEADLINE OF 1/3/23

## 2023-01-02 ENCOUNTER — APPOINTMENT (OUTPATIENT)
Dept: CARDIAC REHAB | Facility: HOSPITAL | Age: 83
End: 2023-01-02

## 2023-01-03 ENCOUNTER — OFFICE VISIT (OUTPATIENT)
Dept: CARDIAC REHAB | Facility: HOSPITAL | Age: 83
End: 2023-01-03

## 2023-01-03 VITALS
HEART RATE: 69 BPM | BODY MASS INDEX: 33.51 KG/M2 | OXYGEN SATURATION: 96 % | DIASTOLIC BLOOD PRESSURE: 78 MMHG | WEIGHT: 247.14 LBS | SYSTOLIC BLOOD PRESSURE: 130 MMHG

## 2023-01-03 DIAGNOSIS — Z95.5 HISTORY OF HEART ARTERY STENT: Primary | ICD-10-CM

## 2023-01-05 ENCOUNTER — OFFICE VISIT (OUTPATIENT)
Dept: CARDIAC REHAB | Facility: HOSPITAL | Age: 83
End: 2023-01-05

## 2023-01-05 VITALS
HEART RATE: 65 BPM | DIASTOLIC BLOOD PRESSURE: 70 MMHG | SYSTOLIC BLOOD PRESSURE: 140 MMHG | BODY MASS INDEX: 33.21 KG/M2 | OXYGEN SATURATION: 98 % | WEIGHT: 244.93 LBS

## 2023-01-05 DIAGNOSIS — Z95.5 HISTORY OF HEART ARTERY STENT: Primary | ICD-10-CM

## 2023-01-06 NOTE — TELEPHONE ENCOUNTER
Caller: Michael Sol    Relationship: Self    Best call back number: [phone]    What form or medical record are you requesting: MOST RECENT URINALYSIS, LABS, CHEST XRAY    Who is requesting this form or medical record from you: COMPANY [facility]OUGH [facility]    How would you like to receive the form or medical records (pick-up, mail, fax): FAX  If fax, what is the fax number: [phone]    Timeframe paperwork needed: AS SOON AS POSSIBLE    Additional notes: PATIENT IS NEEDING THESE FAXED TO COMPANY [facility] AS THE OTHER PAPER WERE SENT TO WRONG PLACE.

## 2023-01-09 ENCOUNTER — APPOINTMENT (OUTPATIENT)
Dept: CARDIAC REHAB | Facility: HOSPITAL | Age: 83
End: 2023-01-09

## 2023-01-10 ENCOUNTER — OFFICE VISIT (OUTPATIENT)
Dept: CARDIAC REHAB | Facility: HOSPITAL | Age: 83
End: 2023-01-10

## 2023-01-10 VITALS
BODY MASS INDEX: 33.42 KG/M2 | SYSTOLIC BLOOD PRESSURE: 116 MMHG | DIASTOLIC BLOOD PRESSURE: 72 MMHG | WEIGHT: 246.47 LBS | OXYGEN SATURATION: 98 % | HEART RATE: 73 BPM

## 2023-01-10 DIAGNOSIS — Z95.5 HISTORY OF HEART ARTERY STENT: Primary | ICD-10-CM

## 2023-01-12 ENCOUNTER — OFFICE VISIT (OUTPATIENT)
Dept: CARDIAC REHAB | Facility: HOSPITAL | Age: 83
End: 2023-01-12

## 2023-01-12 VITALS
SYSTOLIC BLOOD PRESSURE: 130 MMHG | HEART RATE: 74 BPM | DIASTOLIC BLOOD PRESSURE: 80 MMHG | WEIGHT: 247.8 LBS | BODY MASS INDEX: 33.6 KG/M2 | OXYGEN SATURATION: 96 %

## 2023-01-12 DIAGNOSIS — Z95.5 HISTORY OF HEART ARTERY STENT: Primary | ICD-10-CM

## 2023-01-16 ENCOUNTER — APPOINTMENT (OUTPATIENT)
Dept: CARDIAC REHAB | Facility: HOSPITAL | Age: 83
End: 2023-01-16

## 2023-01-16 DIAGNOSIS — I10 ESSENTIAL HYPERTENSION: Chronic | ICD-10-CM

## 2023-01-16 RX ORDER — IRBESARTAN 300 MG/1
300 TABLET ORAL NIGHTLY
Qty: 90 TABLET | Refills: 0 | Status: SHIPPED | OUTPATIENT
Start: 2023-01-16

## 2023-01-19 ENCOUNTER — OFFICE VISIT (OUTPATIENT)
Dept: CARDIAC REHAB | Facility: HOSPITAL | Age: 83
End: 2023-01-19

## 2023-01-19 VITALS
DIASTOLIC BLOOD PRESSURE: 78 MMHG | WEIGHT: 244.93 LBS | SYSTOLIC BLOOD PRESSURE: 120 MMHG | HEART RATE: 63 BPM | BODY MASS INDEX: 33.21 KG/M2 | OXYGEN SATURATION: 97 %

## 2023-01-19 DIAGNOSIS — Z95.5 HISTORY OF HEART ARTERY STENT: Primary | ICD-10-CM

## 2023-01-23 ENCOUNTER — APPOINTMENT (OUTPATIENT)
Dept: CARDIAC REHAB | Facility: HOSPITAL | Age: 83
End: 2023-01-23

## 2023-01-24 ENCOUNTER — OFFICE VISIT (OUTPATIENT)
Dept: CARDIAC REHAB | Facility: HOSPITAL | Age: 83
End: 2023-01-24

## 2023-01-24 VITALS
HEART RATE: 67 BPM | OXYGEN SATURATION: 98 % | BODY MASS INDEX: 33.06 KG/M2 | SYSTOLIC BLOOD PRESSURE: 106 MMHG | WEIGHT: 243.83 LBS | DIASTOLIC BLOOD PRESSURE: 72 MMHG

## 2023-01-24 DIAGNOSIS — Z95.5 HISTORY OF HEART ARTERY STENT: Primary | ICD-10-CM

## 2023-01-26 ENCOUNTER — OFFICE VISIT (OUTPATIENT)
Dept: CARDIAC REHAB | Facility: HOSPITAL | Age: 83
End: 2023-01-26

## 2023-01-26 VITALS
HEART RATE: 69 BPM | DIASTOLIC BLOOD PRESSURE: 64 MMHG | WEIGHT: 246.25 LBS | OXYGEN SATURATION: 94 % | BODY MASS INDEX: 33.39 KG/M2 | SYSTOLIC BLOOD PRESSURE: 110 MMHG

## 2023-01-26 DIAGNOSIS — Z95.5 HISTORY OF HEART ARTERY STENT: Primary | ICD-10-CM

## 2023-01-30 ENCOUNTER — APPOINTMENT (OUTPATIENT)
Dept: CARDIAC REHAB | Facility: HOSPITAL | Age: 83
End: 2023-01-30

## 2023-01-31 ENCOUNTER — APPOINTMENT (OUTPATIENT)
Dept: CARDIAC REHAB | Facility: HOSPITAL | Age: 83
End: 2023-01-31

## 2023-02-06 ENCOUNTER — APPOINTMENT (OUTPATIENT)
Dept: CARDIAC REHAB | Facility: HOSPITAL | Age: 83
End: 2023-02-06

## 2023-02-07 ENCOUNTER — LAB (OUTPATIENT)
Dept: LAB | Facility: HOSPITAL | Age: 83
End: 2023-02-07
Payer: MEDICARE

## 2023-02-07 ENCOUNTER — OFFICE VISIT (OUTPATIENT)
Dept: CARDIAC REHAB | Facility: HOSPITAL | Age: 83
End: 2023-02-07

## 2023-02-07 VITALS
OXYGEN SATURATION: 97 % | BODY MASS INDEX: 33.42 KG/M2 | DIASTOLIC BLOOD PRESSURE: 70 MMHG | SYSTOLIC BLOOD PRESSURE: 120 MMHG | HEART RATE: 68 BPM | WEIGHT: 246.47 LBS

## 2023-02-07 DIAGNOSIS — Z85.46 HISTORY OF PROSTATE CANCER: ICD-10-CM

## 2023-02-07 DIAGNOSIS — N18.32 STAGE 3B CHRONIC KIDNEY DISEASE: ICD-10-CM

## 2023-02-07 DIAGNOSIS — E53.8 VITAMIN B 12 DEFICIENCY: ICD-10-CM

## 2023-02-07 DIAGNOSIS — E55.9 VITAMIN D DEFICIENCY: ICD-10-CM

## 2023-02-07 DIAGNOSIS — I48.0 PAROXYSMAL ATRIAL FIBRILLATION: ICD-10-CM

## 2023-02-07 DIAGNOSIS — E78.2 MIXED HYPERLIPIDEMIA: ICD-10-CM

## 2023-02-07 DIAGNOSIS — R10.84 ABDOMINAL PAIN, GENERALIZED: ICD-10-CM

## 2023-02-07 DIAGNOSIS — I25.10 CORONARY ARTERY DISEASE INVOLVING NATIVE CORONARY ARTERY OF NATIVE HEART WITHOUT ANGINA PECTORIS: ICD-10-CM

## 2023-02-07 DIAGNOSIS — K21.9 GASTROESOPHAGEAL REFLUX DISEASE WITHOUT ESOPHAGITIS: ICD-10-CM

## 2023-02-07 DIAGNOSIS — E03.9 HYPOTHYROIDISM, UNSPECIFIED TYPE: ICD-10-CM

## 2023-02-07 DIAGNOSIS — I10 ESSENTIAL HYPERTENSION: ICD-10-CM

## 2023-02-07 DIAGNOSIS — I10 HYPERTENSION, ESSENTIAL: ICD-10-CM

## 2023-02-07 DIAGNOSIS — Z95.5 HISTORY OF HEART ARTERY STENT: Primary | ICD-10-CM

## 2023-02-07 LAB
ALBUMIN SERPL-MCNC: 4 G/DL (ref 3.5–5.2)
ALBUMIN/GLOB SERPL: 1.7 G/DL
ALP SERPL-CCNC: 74 U/L (ref 39–117)
ALT SERPL W P-5'-P-CCNC: 16 U/L (ref 1–41)
ANION GAP SERPL CALCULATED.3IONS-SCNC: 9 MMOL/L (ref 5–15)
AST SERPL-CCNC: 21 U/L (ref 1–40)
BASOPHILS # BLD AUTO: 0.05 10*3/MM3 (ref 0–0.2)
BASOPHILS NFR BLD AUTO: 0.9 % (ref 0–1.5)
BILIRUB SERPL-MCNC: 0.3 MG/DL (ref 0–1.2)
BUN SERPL-MCNC: 28 MG/DL (ref 8–23)
BUN/CREAT SERPL: 16.1 (ref 7–25)
CALCIUM SPEC-SCNC: 9.1 MG/DL (ref 8.6–10.5)
CHLORIDE SERPL-SCNC: 102 MMOL/L (ref 98–107)
CHOLEST SERPL-MCNC: 141 MG/DL (ref 0–200)
CO2 SERPL-SCNC: 27 MMOL/L (ref 22–29)
CREAT SERPL-MCNC: 1.74 MG/DL (ref 0.76–1.27)
DEPRECATED RDW RBC AUTO: 43.2 FL (ref 37–54)
EGFRCR SERPLBLD CKD-EPI 2021: 38.7 ML/MIN/1.73
EOSINOPHIL # BLD AUTO: 0.39 10*3/MM3 (ref 0–0.4)
EOSINOPHIL NFR BLD AUTO: 7 % (ref 0.3–6.2)
ERYTHROCYTE [DISTWIDTH] IN BLOOD BY AUTOMATED COUNT: 12.9 % (ref 12.3–15.4)
GLOBULIN UR ELPH-MCNC: 2.4 GM/DL
GLUCOSE SERPL-MCNC: 117 MG/DL (ref 65–99)
HCT VFR BLD AUTO: 38.2 % (ref 37.5–51)
HDLC SERPL-MCNC: 34 MG/DL (ref 40–60)
HGB BLD-MCNC: 13.2 G/DL (ref 13–17.7)
IMM GRANULOCYTES # BLD AUTO: 0.02 10*3/MM3 (ref 0–0.05)
IMM GRANULOCYTES NFR BLD AUTO: 0.4 % (ref 0–0.5)
LDLC SERPL CALC-MCNC: 56 MG/DL (ref 0–100)
LDLC/HDLC SERPL: 1.22 {RATIO}
LYMPHOCYTES # BLD AUTO: 1.46 10*3/MM3 (ref 0.7–3.1)
LYMPHOCYTES NFR BLD AUTO: 26.1 % (ref 19.6–45.3)
MCH RBC QN AUTO: 31.7 PG (ref 26.6–33)
MCHC RBC AUTO-ENTMCNC: 34.6 G/DL (ref 31.5–35.7)
MCV RBC AUTO: 91.8 FL (ref 79–97)
MONOCYTES # BLD AUTO: 0.52 10*3/MM3 (ref 0.1–0.9)
MONOCYTES NFR BLD AUTO: 9.3 % (ref 5–12)
NEUTROPHILS NFR BLD AUTO: 3.16 10*3/MM3 (ref 1.7–7)
NEUTROPHILS NFR BLD AUTO: 56.3 % (ref 42.7–76)
NRBC BLD AUTO-RTO: 0 /100 WBC (ref 0–0.2)
PLATELET # BLD AUTO: 121 10*3/MM3 (ref 140–450)
PMV BLD AUTO: 10.8 FL (ref 6–12)
POTASSIUM SERPL-SCNC: 4.8 MMOL/L (ref 3.5–5.2)
PROT SERPL-MCNC: 6.4 G/DL (ref 6–8.5)
PSA SERPL-MCNC: 0.1 NG/ML (ref 0–4)
RBC # BLD AUTO: 4.16 10*6/MM3 (ref 4.14–5.8)
SODIUM SERPL-SCNC: 138 MMOL/L (ref 136–145)
T4 FREE SERPL-MCNC: 1.26 NG/DL (ref 0.93–1.7)
TRIGL SERPL-MCNC: 327 MG/DL (ref 0–150)
TSH SERPL DL<=0.05 MIU/L-ACNC: 3.14 UIU/ML (ref 0.27–4.2)
VLDLC SERPL-MCNC: 51 MG/DL (ref 5–40)
WBC NRBC COR # BLD: 5.6 10*3/MM3 (ref 3.4–10.8)

## 2023-02-07 PROCEDURE — 80053 COMPREHEN METABOLIC PANEL: CPT

## 2023-02-07 PROCEDURE — 36415 COLL VENOUS BLD VENIPUNCTURE: CPT

## 2023-02-07 PROCEDURE — 84153 ASSAY OF PSA TOTAL: CPT

## 2023-02-07 PROCEDURE — 85025 COMPLETE CBC W/AUTO DIFF WBC: CPT

## 2023-02-07 PROCEDURE — 84443 ASSAY THYROID STIM HORMONE: CPT

## 2023-02-07 PROCEDURE — 80061 LIPID PANEL: CPT

## 2023-02-07 PROCEDURE — 84439 ASSAY OF FREE THYROXINE: CPT

## 2023-02-09 ENCOUNTER — OFFICE VISIT (OUTPATIENT)
Dept: CARDIAC REHAB | Facility: HOSPITAL | Age: 83
End: 2023-02-09

## 2023-02-09 VITALS
WEIGHT: 248.9 LBS | DIASTOLIC BLOOD PRESSURE: 70 MMHG | OXYGEN SATURATION: 96 % | HEART RATE: 67 BPM | SYSTOLIC BLOOD PRESSURE: 130 MMHG | BODY MASS INDEX: 33.75 KG/M2

## 2023-02-09 DIAGNOSIS — Z95.5 HISTORY OF HEART ARTERY STENT: Primary | ICD-10-CM

## 2023-02-13 ENCOUNTER — APPOINTMENT (OUTPATIENT)
Dept: CARDIAC REHAB | Facility: HOSPITAL | Age: 83
End: 2023-02-13

## 2023-02-13 DIAGNOSIS — Z12.5 PROSTATE CANCER SCREENING: Primary | ICD-10-CM

## 2023-02-14 ENCOUNTER — OFFICE VISIT (OUTPATIENT)
Dept: CARDIAC REHAB | Facility: HOSPITAL | Age: 83
End: 2023-02-14

## 2023-02-14 VITALS
DIASTOLIC BLOOD PRESSURE: 70 MMHG | BODY MASS INDEX: 33.6 KG/M2 | OXYGEN SATURATION: 98 % | WEIGHT: 247.8 LBS | HEART RATE: 56 BPM | SYSTOLIC BLOOD PRESSURE: 130 MMHG

## 2023-02-14 DIAGNOSIS — Z95.5 HISTORY OF HEART ARTERY STENT: Primary | ICD-10-CM

## 2023-02-20 ENCOUNTER — OFFICE VISIT (OUTPATIENT)
Dept: INTERNAL MEDICINE | Facility: CLINIC | Age: 83
End: 2023-02-20
Payer: MEDICARE

## 2023-02-20 ENCOUNTER — APPOINTMENT (OUTPATIENT)
Dept: CARDIAC REHAB | Facility: HOSPITAL | Age: 83
End: 2023-02-20

## 2023-02-20 VITALS
OXYGEN SATURATION: 97 % | SYSTOLIC BLOOD PRESSURE: 131 MMHG | BODY MASS INDEX: 33.1 KG/M2 | TEMPERATURE: 96.8 F | HEIGHT: 72 IN | DIASTOLIC BLOOD PRESSURE: 79 MMHG | WEIGHT: 244.4 LBS | HEART RATE: 62 BPM

## 2023-02-20 DIAGNOSIS — R10.84 ABDOMINAL PAIN, GENERALIZED: ICD-10-CM

## 2023-02-20 DIAGNOSIS — E55.9 VITAMIN D DEFICIENCY: ICD-10-CM

## 2023-02-20 DIAGNOSIS — I10 HYPERTENSION, ESSENTIAL: ICD-10-CM

## 2023-02-20 DIAGNOSIS — R73.01 IFG (IMPAIRED FASTING GLUCOSE): ICD-10-CM

## 2023-02-20 DIAGNOSIS — E78.2 MIXED HYPERLIPIDEMIA: ICD-10-CM

## 2023-02-20 DIAGNOSIS — E53.8 VITAMIN B 12 DEFICIENCY: ICD-10-CM

## 2023-02-20 DIAGNOSIS — N18.32 STAGE 3B CHRONIC KIDNEY DISEASE: ICD-10-CM

## 2023-02-20 DIAGNOSIS — I25.10 CORONARY ARTERY DISEASE INVOLVING NATIVE CORONARY ARTERY OF NATIVE HEART WITHOUT ANGINA PECTORIS: ICD-10-CM

## 2023-02-20 DIAGNOSIS — R10.9 RT FLANK PAIN: ICD-10-CM

## 2023-02-20 DIAGNOSIS — I48.0 PAROXYSMAL ATRIAL FIBRILLATION: ICD-10-CM

## 2023-02-20 DIAGNOSIS — E03.9 HYPOTHYROIDISM, UNSPECIFIED TYPE: ICD-10-CM

## 2023-02-20 DIAGNOSIS — R07.82 INTERCOSTAL PAIN: Primary | ICD-10-CM

## 2023-02-20 PROCEDURE — 99214 OFFICE O/P EST MOD 30 MIN: CPT | Performed by: INTERNAL MEDICINE

## 2023-02-20 NOTE — PROGRESS NOTES
"CHIEF COMPLAINT/ HPI: Patient is here to follow-up with lab work, previous abdominal and flank pain, blood pressure, prostate cancer, and coronary disease  Hypertension and Follow-up    , He is here to follow-up with his chronic kidney disease for which she is still seeing nephrology,          Objective   Vital Signs  Vitals:    02/20/23 0847   BP: 131/79   Pulse: 62   Temp: 96.8 °F (36 °C)   SpO2: 97%   Weight: 111 kg (244 lb 6.4 oz)   Height: 182.9 cm (72.01\")      Body mass index is 33.14 kg/m².  Review of Systems   Constitutional: Negative.    HENT: Negative.    Eyes: Negative.    Respiratory: Negative.    Cardiovascular: Negative.    Gastrointestinal: Negative.    Endocrine: Negative.    Genitourinary: Negative.    Musculoskeletal: Negative.    Allergic/Immunologic: Negative.    Neurological: Negative.    Hematological: Negative.    Psychiatric/Behavioral: Negative.       Physical Exam  Constitutional:       General: He is not in acute distress.     Appearance: Normal appearance. He is obese.   HENT:      Head: Normocephalic.      Mouth/Throat:      Mouth: Mucous membranes are moist.   Eyes:      Conjunctiva/sclera: Conjunctivae normal.      Pupils: Pupils are equal, round, and reactive to light.   Cardiovascular:      Rate and Rhythm: Normal rate and regular rhythm.      Pulses: Normal pulses.      Heart sounds: Normal heart sounds.   Pulmonary:      Effort: Pulmonary effort is normal.      Breath sounds: Normal breath sounds.   Abdominal:      General: Bowel sounds are normal.      Palpations: Abdomen is soft.   Musculoskeletal:         General: No swelling. Normal range of motion.      Cervical back: Neck supple.   Skin:     General: Skin is warm and dry.      Coloration: Skin is not jaundiced.   Neurological:      General: No focal deficit present.      Mental Status: He is alert and oriented to person, place, and time. Mental status is at baseline.   Psychiatric:         Mood and Affect: Mood normal.       "   Behavior: Behavior normal.         Thought Content: Thought content normal.         Judgment: Judgment normal.        Result Review :   Lab Results   Component Value Date    PROBNP 30.7 09/14/2021    PROBNP 17.7 06/23/2021     CMP    CMP 8/15/22 11/30/22 2/7/23   Glucose 112 (A) 116 (A) 117 (A)   BUN 21 24 (A) 28 (A)   Creatinine 1.73 (A) 1.84 (A) 1.74 (A)   eGFR 38.9 (A) 36.2 (A) 38.7 (A)   Sodium 137 140 138   Potassium 4.5 4.5 4.8   Chloride 100 98 102   Calcium 9.1 9.9 9.1   Total Protein 7.0 7.2 6.4   Albumin 4.30 4.60 4.0   Globulin 2.7 2.6 2.4   Total Bilirubin 0.4 0.5 0.3   Alkaline Phosphatase 91 71 74   AST (SGOT) 20 22 21   ALT (SGPT) 16 26 16   Albumin/Globulin Ratio 1.6 1.8 1.7   BUN/Creatinine Ratio 12.1 13.0 16.1   Anion Gap 10.1 14.3 9.0   (A) Abnormal value       Comments are available for some flowsheets but are not being displayed.           CBC w/diff    CBC w/Diff 8/15/22 11/30/22 2/7/23   WBC 6.17 5.94 5.60   RBC 4.32 4.52 4.16   Hemoglobin 13.8 14.1 13.2   Hematocrit 39.7 40.0 38.2   MCV 91.9 88.5 91.8   MCH 31.9 31.2 31.7   MCHC 34.8 35.3 34.6   RDW 12.9 12.7 12.9   Platelets 109 (A) 131 (A) 121 (A)   Neutrophil Rel % 56.5 59.0 56.3   Immature Granulocyte Rel % 0.2 0.5 0.4   Lymphocyte Rel % 20.6 25.1 26.1   Monocyte Rel % 13.5 (A) 9.6 9.3   Eosinophil Rel % 8.6 (A) 5.1 7.0 (A)   Basophil Rel % 0.6 0.7 0.9   (A) Abnormal value             Lipid Panel    Lipid Panel 6/13/22 8/15/22 2/7/23   Total Cholesterol 125 108 141   Triglycerides 188 (A) 154 (A) 327 (A)   HDL Cholesterol 39 (A) 34 (A) 34 (A)   VLDL Cholesterol 31 26 51 (A)   LDL Cholesterol  55 48 56   LDL/HDL Ratio 1.24 1.27 1.22   (A) Abnormal value             Lab Results   Component Value Date    TSH 3.140 02/07/2023    TSH 3.150 08/15/2022    TSH 3.890 03/07/2022      Lab Results   Component Value Date    FREET4 1.26 02/07/2023    FREET4 1.36 08/15/2022    FREET4 1.25 03/07/2022      A1C Last 3 Results    HGBA1C Last 3 Results  3/7/22   Hemoglobin A1C 5.70 (A)   (A) Abnormal value             PSA    PSA 2/7/23   PSA 0.104                          Visit Diagnoses:    ICD-10-CM ICD-9-CM   1. Intercostal pain  R07.82 786.59   2. Vitamin D deficiency  E55.9 268.9   3. Stage 3b chronic kidney disease (HCC)  N18.32 585.3   4. Hypertension, essential  I10 401.9   5. Hypothyroidism, unspecified type  E03.9 244.9   6. Rt flank pain  R10.9 789.09   7. Abdominal pain, generalized  R10.84 789.07   8. Vitamin B 12 deficiency  E53.8 266.2   9. Paroxysmal atrial fibrillation (HCC)  I48.0 427.31   10. Mixed hyperlipidemia  E78.2 272.2   11. Coronary artery disease involving native coronary artery of native heart without angina pectoris  I25.10 414.01   12. IFG (impaired fasting glucose)  R73.01 790.21       Assessment and Plan   Diagnoses and all orders for this visit:    1. Intercostal pain (Primary)  -     Hemoglobin A1c; Future  -     Comprehensive Metabolic Panel; Future  -     CBC & Differential; Future    2. Vitamin D deficiency  -     Hemoglobin A1c; Future  -     Comprehensive Metabolic Panel; Future  -     CBC & Differential; Future    3. Stage 3b chronic kidney disease (HCC)  -     Hemoglobin A1c; Future  -     Comprehensive Metabolic Panel; Future  -     CBC & Differential; Future    4. Hypertension, essential  -     Hemoglobin A1c; Future  -     Comprehensive Metabolic Panel; Future  -     CBC & Differential; Future    5. Hypothyroidism, unspecified type  -     Hemoglobin A1c; Future  -     Comprehensive Metabolic Panel; Future  -     CBC & Differential; Future    6. Rt flank pain  -     Hemoglobin A1c; Future  -     Comprehensive Metabolic Panel; Future  -     CBC & Differential; Future    7. Abdominal pain, generalized  -     Hemoglobin A1c; Future  -     Comprehensive Metabolic Panel; Future  -     CBC & Differential; Future    8. Vitamin B 12 deficiency  -     Hemoglobin A1c; Future  -     Comprehensive Metabolic Panel; Future  -     CBC &  Differential; Future    9. Paroxysmal atrial fibrillation (HCC)  -     Hemoglobin A1c; Future  -     Comprehensive Metabolic Panel; Future  -     CBC & Differential; Future    10. Mixed hyperlipidemia  -     Hemoglobin A1c; Future  -     Comprehensive Metabolic Panel; Future  -     CBC & Differential; Future    11. Coronary artery disease involving native coronary artery of native heart without angina pectoris  -     Hemoglobin A1c; Future  -     Comprehensive Metabolic Panel; Future  -     CBC & Differential; Future    12. IFG (impaired fasting glucose)  -     Hemoglobin A1c; Future  -     Comprehensive Metabolic Panel; Future  -     CBC & Differential; Future        Right flank pain,abd pain ---- previous CT scan December 15, 2022 showed mild diverticulosis of the descending colon and sigmoid colon no evidence of infection small inguinal hernias containing fat are stable --- chest x-ray, right rib series, showed no active disease November 30, 2022,     Abdominal pains, right flank pain, ---CT scan unremarkable February 2022,, and as above, December 15, 2022    Impaired fasting glucose blood sugar 117 counseled on diet walking low-carb diet February 20, 2023    CKD stage IIIa, creatinine 1.5-1.8 -seeing dr hernández now aug 2022    Coronary artery disease previous stent placement 2007, continues aspirin,    Insomnia, continues on Elavil 75 mg tablet ---half tablet at bedtime,    Hypertension continues on Norvasc 10 mg daily, irbesartan 300 mg daily    Lower extremity edema ---continues Lasix 20 mg daily    Vitamin D and B12 deficiency continues replacement    Hyperlipidemia continues on Lipitor 80 mg daily,, Vascepa 1 g 2 tablets twice a day,    Hypothyroidism continues on levothyroxine 50 mcg daily    Paroxysmal atrial fibrillation, continues on sotalol 40 mg twice a day----seeing dr martinez     Mild thrombocytopenia, chronic, stable at 109, will follow-up again in 3 months, as of August 2022    psa =0.10 February 7,  2023---, dr olivarez prostate CA, seed implants, 2012,    Overweight, discussed weight loss efforts,      Follow Up   Return in about 6 months (around 8/20/2023).  Patient was given instructions and counseling regarding his condition or for health maintenance advice. Please see specific information pulled into the AVS if appropriate.

## 2023-02-21 ENCOUNTER — OFFICE VISIT (OUTPATIENT)
Dept: CARDIAC REHAB | Facility: HOSPITAL | Age: 83
End: 2023-02-21

## 2023-02-21 VITALS
OXYGEN SATURATION: 95 % | BODY MASS INDEX: 32.88 KG/M2 | HEART RATE: 69 BPM | DIASTOLIC BLOOD PRESSURE: 70 MMHG | SYSTOLIC BLOOD PRESSURE: 120 MMHG | WEIGHT: 242.51 LBS

## 2023-02-21 DIAGNOSIS — Z95.5 HISTORY OF HEART ARTERY STENT: Primary | ICD-10-CM

## 2023-02-23 ENCOUNTER — OFFICE VISIT (OUTPATIENT)
Dept: CARDIAC REHAB | Facility: HOSPITAL | Age: 83
End: 2023-02-23

## 2023-02-23 VITALS
HEART RATE: 66 BPM | WEIGHT: 245.15 LBS | OXYGEN SATURATION: 96 % | DIASTOLIC BLOOD PRESSURE: 70 MMHG | SYSTOLIC BLOOD PRESSURE: 128 MMHG | BODY MASS INDEX: 33.24 KG/M2

## 2023-02-23 DIAGNOSIS — Z95.5 HISTORY OF HEART ARTERY STENT: Primary | ICD-10-CM

## 2023-02-27 ENCOUNTER — APPOINTMENT (OUTPATIENT)
Dept: CARDIAC REHAB | Facility: HOSPITAL | Age: 83
End: 2023-02-27

## 2023-02-28 ENCOUNTER — OFFICE VISIT (OUTPATIENT)
Dept: CARDIAC REHAB | Facility: HOSPITAL | Age: 83
End: 2023-02-28

## 2023-02-28 VITALS
BODY MASS INDEX: 33.6 KG/M2 | HEART RATE: 67 BPM | OXYGEN SATURATION: 97 % | SYSTOLIC BLOOD PRESSURE: 140 MMHG | DIASTOLIC BLOOD PRESSURE: 80 MMHG | WEIGHT: 247.8 LBS

## 2023-02-28 DIAGNOSIS — Z95.5 HISTORY OF HEART ARTERY STENT: Primary | ICD-10-CM

## 2023-03-02 ENCOUNTER — OFFICE VISIT (OUTPATIENT)
Dept: CARDIAC REHAB | Facility: HOSPITAL | Age: 83
End: 2023-03-02

## 2023-03-02 VITALS
BODY MASS INDEX: 33.84 KG/M2 | SYSTOLIC BLOOD PRESSURE: 140 MMHG | HEART RATE: 67 BPM | OXYGEN SATURATION: 96 % | DIASTOLIC BLOOD PRESSURE: 80 MMHG | WEIGHT: 249.56 LBS

## 2023-03-02 DIAGNOSIS — Z95.5 HISTORY OF HEART ARTERY STENT: Primary | ICD-10-CM

## 2023-03-06 ENCOUNTER — APPOINTMENT (OUTPATIENT)
Dept: CARDIAC REHAB | Facility: HOSPITAL | Age: 83
End: 2023-03-06

## 2023-03-07 ENCOUNTER — OFFICE VISIT (OUTPATIENT)
Dept: CARDIAC REHAB | Facility: HOSPITAL | Age: 83
End: 2023-03-07

## 2023-03-07 VITALS
BODY MASS INDEX: 33.36 KG/M2 | DIASTOLIC BLOOD PRESSURE: 78 MMHG | WEIGHT: 246.03 LBS | SYSTOLIC BLOOD PRESSURE: 122 MMHG | OXYGEN SATURATION: 95 % | HEART RATE: 72 BPM

## 2023-03-07 DIAGNOSIS — Z95.5 HISTORY OF HEART ARTERY STENT: Primary | ICD-10-CM

## 2023-03-09 ENCOUNTER — OFFICE VISIT (OUTPATIENT)
Dept: CARDIAC REHAB | Facility: HOSPITAL | Age: 83
End: 2023-03-09

## 2023-03-09 VITALS
HEART RATE: 66 BPM | BODY MASS INDEX: 33.45 KG/M2 | SYSTOLIC BLOOD PRESSURE: 126 MMHG | DIASTOLIC BLOOD PRESSURE: 70 MMHG | WEIGHT: 246.69 LBS | OXYGEN SATURATION: 96 %

## 2023-03-09 DIAGNOSIS — Z95.5 HISTORY OF HEART ARTERY STENT: Primary | ICD-10-CM

## 2023-03-13 ENCOUNTER — APPOINTMENT (OUTPATIENT)
Dept: CARDIAC REHAB | Facility: HOSPITAL | Age: 83
End: 2023-03-13

## 2023-03-16 ENCOUNTER — OFFICE VISIT (OUTPATIENT)
Dept: CARDIAC REHAB | Facility: HOSPITAL | Age: 83
End: 2023-03-16

## 2023-03-16 VITALS
HEART RATE: 68 BPM | SYSTOLIC BLOOD PRESSURE: 120 MMHG | OXYGEN SATURATION: 98 % | DIASTOLIC BLOOD PRESSURE: 70 MMHG | BODY MASS INDEX: 33.93 KG/M2 | WEIGHT: 250.22 LBS

## 2023-03-16 DIAGNOSIS — Z95.5 HISTORY OF HEART ARTERY STENT: Primary | ICD-10-CM

## 2023-03-20 ENCOUNTER — APPOINTMENT (OUTPATIENT)
Dept: CARDIAC REHAB | Facility: HOSPITAL | Age: 83
End: 2023-03-20

## 2023-03-21 ENCOUNTER — OFFICE VISIT (OUTPATIENT)
Dept: CARDIAC REHAB | Facility: HOSPITAL | Age: 83
End: 2023-03-21

## 2023-03-21 VITALS
OXYGEN SATURATION: 96 % | BODY MASS INDEX: 33.48 KG/M2 | SYSTOLIC BLOOD PRESSURE: 124 MMHG | WEIGHT: 246.91 LBS | DIASTOLIC BLOOD PRESSURE: 80 MMHG | HEART RATE: 62 BPM

## 2023-03-21 DIAGNOSIS — Z95.5 HISTORY OF HEART ARTERY STENT: Primary | ICD-10-CM

## 2023-03-23 ENCOUNTER — OFFICE VISIT (OUTPATIENT)
Dept: CARDIAC REHAB | Facility: HOSPITAL | Age: 83
End: 2023-03-23

## 2023-03-23 VITALS
BODY MASS INDEX: 33.81 KG/M2 | WEIGHT: 249.34 LBS | HEART RATE: 68 BPM | SYSTOLIC BLOOD PRESSURE: 124 MMHG | OXYGEN SATURATION: 93 % | DIASTOLIC BLOOD PRESSURE: 62 MMHG

## 2023-03-23 DIAGNOSIS — Z95.5 HISTORY OF HEART ARTERY STENT: Primary | ICD-10-CM

## 2023-03-27 ENCOUNTER — APPOINTMENT (OUTPATIENT)
Dept: CARDIAC REHAB | Facility: HOSPITAL | Age: 83
End: 2023-03-27

## 2023-03-30 ENCOUNTER — OFFICE VISIT (OUTPATIENT)
Dept: CARDIAC REHAB | Facility: HOSPITAL | Age: 83
End: 2023-03-30

## 2023-03-30 VITALS
HEART RATE: 66 BPM | SYSTOLIC BLOOD PRESSURE: 120 MMHG | OXYGEN SATURATION: 95 % | BODY MASS INDEX: 33.72 KG/M2 | DIASTOLIC BLOOD PRESSURE: 70 MMHG | WEIGHT: 248.68 LBS

## 2023-03-30 DIAGNOSIS — Z95.5 HISTORY OF HEART ARTERY STENT: Primary | ICD-10-CM

## 2023-04-03 ENCOUNTER — APPOINTMENT (OUTPATIENT)
Dept: CARDIAC REHAB | Facility: HOSPITAL | Age: 83
End: 2023-04-03

## 2023-04-06 ENCOUNTER — OFFICE VISIT (OUTPATIENT)
Dept: CARDIAC REHAB | Facility: HOSPITAL | Age: 83
End: 2023-04-06

## 2023-04-06 VITALS
WEIGHT: 248.02 LBS | BODY MASS INDEX: 33.63 KG/M2 | HEART RATE: 65 BPM | OXYGEN SATURATION: 96 % | DIASTOLIC BLOOD PRESSURE: 74 MMHG | SYSTOLIC BLOOD PRESSURE: 120 MMHG

## 2023-04-06 DIAGNOSIS — Z95.5 HISTORY OF HEART ARTERY STENT: Primary | ICD-10-CM

## 2023-04-10 ENCOUNTER — APPOINTMENT (OUTPATIENT)
Dept: CARDIAC REHAB | Facility: HOSPITAL | Age: 83
End: 2023-04-10

## 2023-04-13 ENCOUNTER — OFFICE VISIT (OUTPATIENT)
Dept: CARDIAC REHAB | Facility: HOSPITAL | Age: 83
End: 2023-04-13

## 2023-04-13 VITALS
HEART RATE: 62 BPM | WEIGHT: 248.24 LBS | SYSTOLIC BLOOD PRESSURE: 140 MMHG | DIASTOLIC BLOOD PRESSURE: 72 MMHG | OXYGEN SATURATION: 98 % | BODY MASS INDEX: 33.66 KG/M2

## 2023-04-13 DIAGNOSIS — Z95.5 HISTORY OF HEART ARTERY STENT: Primary | ICD-10-CM

## 2023-04-17 ENCOUNTER — APPOINTMENT (OUTPATIENT)
Dept: CARDIAC REHAB | Facility: HOSPITAL | Age: 83
End: 2023-04-17

## 2023-04-20 ENCOUNTER — APPOINTMENT (OUTPATIENT)
Dept: CARDIAC REHAB | Facility: HOSPITAL | Age: 83
End: 2023-04-20

## 2023-04-24 ENCOUNTER — APPOINTMENT (OUTPATIENT)
Dept: CARDIAC REHAB | Facility: HOSPITAL | Age: 83
End: 2023-04-24

## 2023-04-25 ENCOUNTER — OFFICE VISIT (OUTPATIENT)
Dept: CARDIAC REHAB | Facility: HOSPITAL | Age: 83
End: 2023-04-25

## 2023-04-25 VITALS
WEIGHT: 247.8 LBS | SYSTOLIC BLOOD PRESSURE: 118 MMHG | OXYGEN SATURATION: 94 % | BODY MASS INDEX: 33.6 KG/M2 | DIASTOLIC BLOOD PRESSURE: 78 MMHG | HEART RATE: 67 BPM

## 2023-04-25 DIAGNOSIS — Z95.5 HISTORY OF HEART ARTERY STENT: Primary | ICD-10-CM

## 2023-04-27 ENCOUNTER — OFFICE VISIT (OUTPATIENT)
Dept: CARDIAC REHAB | Facility: HOSPITAL | Age: 83
End: 2023-04-27

## 2023-04-27 VITALS
HEART RATE: 64 BPM | WEIGHT: 248.68 LBS | OXYGEN SATURATION: 96 % | BODY MASS INDEX: 33.72 KG/M2 | DIASTOLIC BLOOD PRESSURE: 80 MMHG | SYSTOLIC BLOOD PRESSURE: 120 MMHG

## 2023-04-27 DIAGNOSIS — Z95.5 HISTORY OF HEART ARTERY STENT: Primary | ICD-10-CM

## 2023-05-01 ENCOUNTER — APPOINTMENT (OUTPATIENT)
Dept: CARDIAC REHAB | Facility: HOSPITAL | Age: 83
End: 2023-05-01

## 2023-05-04 ENCOUNTER — OFFICE VISIT (OUTPATIENT)
Dept: CARDIAC REHAB | Facility: HOSPITAL | Age: 83
End: 2023-05-04

## 2023-05-04 VITALS
OXYGEN SATURATION: 97 % | BODY MASS INDEX: 33.54 KG/M2 | DIASTOLIC BLOOD PRESSURE: 82 MMHG | SYSTOLIC BLOOD PRESSURE: 110 MMHG | HEART RATE: 74 BPM | WEIGHT: 247.36 LBS

## 2023-05-04 DIAGNOSIS — Z95.5 HISTORY OF HEART ARTERY STENT: Primary | ICD-10-CM

## 2023-05-08 ENCOUNTER — APPOINTMENT (OUTPATIENT)
Dept: CARDIAC REHAB | Facility: HOSPITAL | Age: 83
End: 2023-05-08

## 2023-05-09 ENCOUNTER — OFFICE VISIT (OUTPATIENT)
Dept: CARDIAC REHAB | Facility: HOSPITAL | Age: 83
End: 2023-05-09

## 2023-05-09 VITALS
WEIGHT: 244.05 LBS | OXYGEN SATURATION: 94 % | DIASTOLIC BLOOD PRESSURE: 70 MMHG | BODY MASS INDEX: 33.09 KG/M2 | HEART RATE: 66 BPM | SYSTOLIC BLOOD PRESSURE: 110 MMHG

## 2023-05-09 DIAGNOSIS — Z95.5 HISTORY OF HEART ARTERY STENT: Primary | ICD-10-CM

## 2023-05-15 ENCOUNTER — APPOINTMENT (OUTPATIENT)
Dept: CARDIAC REHAB | Facility: HOSPITAL | Age: 83
End: 2023-05-15

## 2023-05-16 ENCOUNTER — OFFICE VISIT (OUTPATIENT)
Dept: CARDIAC REHAB | Facility: HOSPITAL | Age: 83
End: 2023-05-16

## 2023-05-16 VITALS
SYSTOLIC BLOOD PRESSURE: 134 MMHG | WEIGHT: 246.25 LBS | DIASTOLIC BLOOD PRESSURE: 78 MMHG | OXYGEN SATURATION: 97 % | BODY MASS INDEX: 33.39 KG/M2 | HEART RATE: 62 BPM

## 2023-05-16 DIAGNOSIS — Z95.5 HISTORY OF HEART ARTERY STENT: Primary | ICD-10-CM

## 2023-05-18 ENCOUNTER — OFFICE VISIT (OUTPATIENT)
Dept: CARDIAC REHAB | Facility: HOSPITAL | Age: 83
End: 2023-05-18

## 2023-05-18 VITALS
WEIGHT: 246.91 LBS | HEART RATE: 60 BPM | DIASTOLIC BLOOD PRESSURE: 70 MMHG | SYSTOLIC BLOOD PRESSURE: 110 MMHG | BODY MASS INDEX: 33.48 KG/M2

## 2023-05-18 DIAGNOSIS — Z95.5 HISTORY OF HEART ARTERY STENT: Primary | ICD-10-CM

## 2023-05-22 ENCOUNTER — APPOINTMENT (OUTPATIENT)
Dept: CARDIAC REHAB | Facility: HOSPITAL | Age: 83
End: 2023-05-22

## 2023-05-23 ENCOUNTER — APPOINTMENT (OUTPATIENT)
Dept: CARDIAC REHAB | Facility: HOSPITAL | Age: 83
End: 2023-05-23

## 2023-05-25 ENCOUNTER — OFFICE VISIT (OUTPATIENT)
Dept: CARDIAC REHAB | Facility: HOSPITAL | Age: 83
End: 2023-05-25

## 2023-05-25 VITALS
OXYGEN SATURATION: 97 % | WEIGHT: 244.71 LBS | HEART RATE: 66 BPM | SYSTOLIC BLOOD PRESSURE: 108 MMHG | DIASTOLIC BLOOD PRESSURE: 60 MMHG | BODY MASS INDEX: 33.18 KG/M2

## 2023-05-25 DIAGNOSIS — Z95.5 HISTORY OF HEART ARTERY STENT: Primary | ICD-10-CM

## 2023-06-01 ENCOUNTER — OFFICE VISIT (OUTPATIENT)
Dept: CARDIAC REHAB | Facility: HOSPITAL | Age: 83
End: 2023-06-01

## 2023-06-01 VITALS
SYSTOLIC BLOOD PRESSURE: 102 MMHG | HEART RATE: 83 BPM | WEIGHT: 246.91 LBS | DIASTOLIC BLOOD PRESSURE: 64 MMHG | BODY MASS INDEX: 33.48 KG/M2

## 2023-06-01 DIAGNOSIS — Z95.5 HISTORY OF HEART ARTERY STENT: Primary | ICD-10-CM

## 2023-06-08 ENCOUNTER — OFFICE VISIT (OUTPATIENT)
Dept: CARDIAC REHAB | Facility: HOSPITAL | Age: 83
End: 2023-06-08

## 2023-06-08 VITALS
WEIGHT: 244.05 LBS | BODY MASS INDEX: 33.09 KG/M2 | SYSTOLIC BLOOD PRESSURE: 104 MMHG | HEART RATE: 71 BPM | DIASTOLIC BLOOD PRESSURE: 65 MMHG | OXYGEN SATURATION: 95 %

## 2023-06-08 DIAGNOSIS — Z95.5 HISTORY OF HEART ARTERY STENT: Primary | ICD-10-CM

## 2023-06-15 ENCOUNTER — OFFICE VISIT (OUTPATIENT)
Dept: CARDIAC REHAB | Facility: HOSPITAL | Age: 83
End: 2023-06-15

## 2023-06-15 VITALS
WEIGHT: 244.49 LBS | BODY MASS INDEX: 33.15 KG/M2 | OXYGEN SATURATION: 95 % | HEART RATE: 68 BPM | SYSTOLIC BLOOD PRESSURE: 106 MMHG | DIASTOLIC BLOOD PRESSURE: 64 MMHG

## 2023-06-15 DIAGNOSIS — Z95.5 HISTORY OF HEART ARTERY STENT: Primary | ICD-10-CM

## 2023-06-15 NOTE — PROGRESS NOTES
Needs prescription for Xarelto called into [facility].  Please do this after1/1/19 as that is when his insurance changes   [facility].  See session report.

## 2023-07-03 ENCOUNTER — APPOINTMENT (OUTPATIENT)
Dept: CARDIAC REHAB | Facility: HOSPITAL | Age: 83
End: 2023-07-03

## 2023-07-10 ENCOUNTER — APPOINTMENT (OUTPATIENT)
Dept: CARDIAC REHAB | Facility: HOSPITAL | Age: 83
End: 2023-07-10

## 2023-07-11 ENCOUNTER — APPOINTMENT (OUTPATIENT)
Dept: CARDIAC REHAB | Facility: HOSPITAL | Age: 83
End: 2023-07-11

## 2023-07-13 ENCOUNTER — APPOINTMENT (OUTPATIENT)
Dept: CARDIAC REHAB | Facility: HOSPITAL | Age: 83
End: 2023-07-13

## 2023-07-17 ENCOUNTER — APPOINTMENT (OUTPATIENT)
Dept: CARDIAC REHAB | Facility: HOSPITAL | Age: 83
End: 2023-07-17

## 2023-07-24 ENCOUNTER — APPOINTMENT (OUTPATIENT)
Dept: CARDIAC REHAB | Facility: HOSPITAL | Age: 83
End: 2023-07-24

## 2023-07-25 ENCOUNTER — OFFICE VISIT (OUTPATIENT)
Dept: CARDIAC REHAB | Facility: HOSPITAL | Age: 83
End: 2023-07-25

## 2023-07-25 VITALS
DIASTOLIC BLOOD PRESSURE: 80 MMHG | BODY MASS INDEX: 32.59 KG/M2 | HEART RATE: 70 BPM | OXYGEN SATURATION: 95 % | SYSTOLIC BLOOD PRESSURE: 128 MMHG | WEIGHT: 240.3 LBS

## 2023-07-25 DIAGNOSIS — Z95.5 HISTORY OF HEART ARTERY STENT: Primary | ICD-10-CM

## 2023-07-27 ENCOUNTER — OFFICE VISIT (OUTPATIENT)
Dept: CARDIAC REHAB | Facility: HOSPITAL | Age: 83
End: 2023-07-27

## 2023-07-27 VITALS
BODY MASS INDEX: 32.89 KG/M2 | OXYGEN SATURATION: 95 % | SYSTOLIC BLOOD PRESSURE: 128 MMHG | WEIGHT: 242.51 LBS | HEART RATE: 64 BPM | DIASTOLIC BLOOD PRESSURE: 78 MMHG

## 2023-07-27 DIAGNOSIS — Z95.5 HISTORY OF HEART ARTERY STENT: Primary | ICD-10-CM

## 2023-07-31 ENCOUNTER — APPOINTMENT (OUTPATIENT)
Dept: CARDIAC REHAB | Facility: HOSPITAL | Age: 83
End: 2023-07-31

## 2023-08-01 ENCOUNTER — OFFICE VISIT (OUTPATIENT)
Dept: CARDIAC REHAB | Facility: HOSPITAL | Age: 83
End: 2023-08-01

## 2023-08-01 VITALS
WEIGHT: 243.39 LBS | DIASTOLIC BLOOD PRESSURE: 80 MMHG | OXYGEN SATURATION: 97 % | BODY MASS INDEX: 33.01 KG/M2 | HEART RATE: 62 BPM | SYSTOLIC BLOOD PRESSURE: 132 MMHG

## 2023-08-01 DIAGNOSIS — Z95.5 HISTORY OF HEART ARTERY STENT: Primary | ICD-10-CM

## 2023-08-03 ENCOUNTER — OFFICE VISIT (OUTPATIENT)
Dept: CARDIAC REHAB | Facility: HOSPITAL | Age: 83
End: 2023-08-03

## 2023-08-03 VITALS
HEART RATE: 65 BPM | SYSTOLIC BLOOD PRESSURE: 126 MMHG | WEIGHT: 244.84 LBS | OXYGEN SATURATION: 97 % | BODY MASS INDEX: 33.21 KG/M2 | DIASTOLIC BLOOD PRESSURE: 80 MMHG

## 2023-08-03 DIAGNOSIS — Z95.5 HISTORY OF HEART ARTERY STENT: Primary | ICD-10-CM

## 2023-08-07 ENCOUNTER — APPOINTMENT (OUTPATIENT)
Dept: CARDIAC REHAB | Facility: HOSPITAL | Age: 83
End: 2023-08-07

## 2023-08-10 ENCOUNTER — OFFICE VISIT (OUTPATIENT)
Dept: CARDIAC REHAB | Facility: HOSPITAL | Age: 83
End: 2023-08-10

## 2023-08-10 ENCOUNTER — TRANSCRIBE ORDERS (OUTPATIENT)
Dept: ADMINISTRATIVE | Facility: HOSPITAL | Age: 83
End: 2023-08-10
Payer: MEDICARE

## 2023-08-10 VITALS
OXYGEN SATURATION: 96 % | HEART RATE: 64 BPM | DIASTOLIC BLOOD PRESSURE: 70 MMHG | SYSTOLIC BLOOD PRESSURE: 124 MMHG | WEIGHT: 242.51 LBS | BODY MASS INDEX: 32.89 KG/M2

## 2023-08-10 DIAGNOSIS — N18.32 STAGE 3B CHRONIC KIDNEY DISEASE: Primary | ICD-10-CM

## 2023-08-10 DIAGNOSIS — Z95.5 HISTORY OF HEART ARTERY STENT: Primary | ICD-10-CM

## 2023-08-10 DIAGNOSIS — E55.9 VITAMIN D DEFICIENCY: ICD-10-CM

## 2023-08-11 ENCOUNTER — LAB (OUTPATIENT)
Dept: LAB | Facility: HOSPITAL | Age: 83
End: 2023-08-11
Payer: MEDICARE

## 2023-08-11 DIAGNOSIS — E53.8 VITAMIN B 12 DEFICIENCY: ICD-10-CM

## 2023-08-11 DIAGNOSIS — N18.32 STAGE 3B CHRONIC KIDNEY DISEASE: ICD-10-CM

## 2023-08-11 DIAGNOSIS — R07.82 INTERCOSTAL PAIN: ICD-10-CM

## 2023-08-11 DIAGNOSIS — I25.10 CORONARY ARTERY DISEASE INVOLVING NATIVE CORONARY ARTERY OF NATIVE HEART WITHOUT ANGINA PECTORIS: ICD-10-CM

## 2023-08-11 DIAGNOSIS — E78.2 MIXED HYPERLIPIDEMIA: ICD-10-CM

## 2023-08-11 DIAGNOSIS — E03.9 HYPOTHYROIDISM, UNSPECIFIED TYPE: ICD-10-CM

## 2023-08-11 DIAGNOSIS — E55.9 VITAMIN D DEFICIENCY: ICD-10-CM

## 2023-08-11 DIAGNOSIS — I48.0 PAROXYSMAL ATRIAL FIBRILLATION: ICD-10-CM

## 2023-08-11 DIAGNOSIS — I10 HYPERTENSION, ESSENTIAL: ICD-10-CM

## 2023-08-11 DIAGNOSIS — R73.01 IFG (IMPAIRED FASTING GLUCOSE): ICD-10-CM

## 2023-08-11 DIAGNOSIS — R10.84 ABDOMINAL PAIN, GENERALIZED: ICD-10-CM

## 2023-08-11 DIAGNOSIS — R10.9 RT FLANK PAIN: ICD-10-CM

## 2023-08-11 LAB
25(OH)D3 SERPL-MCNC: 51.2 NG/ML (ref 30–100)
ALBUMIN SERPL-MCNC: 4.1 G/DL (ref 3.5–5.2)
ALBUMIN/GLOB SERPL: 2.2 G/DL
ALP SERPL-CCNC: 65 U/L (ref 39–117)
ALT SERPL W P-5'-P-CCNC: 15 U/L (ref 1–41)
ANION GAP SERPL CALCULATED.3IONS-SCNC: 11.9 MMOL/L (ref 5–15)
AST SERPL-CCNC: 17 U/L (ref 1–40)
BASOPHILS # BLD AUTO: 0.04 10*3/MM3 (ref 0–0.2)
BASOPHILS NFR BLD AUTO: 0.8 % (ref 0–1.5)
BILIRUB SERPL-MCNC: 0.4 MG/DL (ref 0–1.2)
BUN SERPL-MCNC: 21 MG/DL (ref 8–23)
BUN/CREAT SERPL: 14.8 (ref 7–25)
CALCIUM SPEC-SCNC: 9.4 MG/DL (ref 8.6–10.5)
CHLORIDE SERPL-SCNC: 104 MMOL/L (ref 98–107)
CO2 SERPL-SCNC: 23.1 MMOL/L (ref 22–29)
CREAT SERPL-MCNC: 1.42 MG/DL (ref 0.76–1.27)
CREAT UR-MCNC: 98.7 MG/DL
DEPRECATED RDW RBC AUTO: 46.5 FL (ref 37–54)
EGFRCR SERPLBLD CKD-EPI 2021: 49 ML/MIN/1.73
EOSINOPHIL # BLD AUTO: 0.29 10*3/MM3 (ref 0–0.4)
EOSINOPHIL NFR BLD AUTO: 5.9 % (ref 0.3–6.2)
ERYTHROCYTE [DISTWIDTH] IN BLOOD BY AUTOMATED COUNT: 13.6 % (ref 12.3–15.4)
GLOBULIN UR ELPH-MCNC: 1.9 GM/DL
GLUCOSE SERPL-MCNC: 118 MG/DL (ref 65–99)
HBA1C MFR BLD: 5.6 % (ref 4.8–5.6)
HCT VFR BLD AUTO: 42.7 % (ref 37.5–51)
HGB BLD-MCNC: 14.4 G/DL (ref 13–17.7)
IMM GRANULOCYTES # BLD AUTO: 0.01 10*3/MM3 (ref 0–0.05)
IMM GRANULOCYTES NFR BLD AUTO: 0.2 % (ref 0–0.5)
LYMPHOCYTES # BLD AUTO: 1.31 10*3/MM3 (ref 0.7–3.1)
LYMPHOCYTES NFR BLD AUTO: 26.7 % (ref 19.6–45.3)
MCH RBC QN AUTO: 31.6 PG (ref 26.6–33)
MCHC RBC AUTO-ENTMCNC: 33.7 G/DL (ref 31.5–35.7)
MCV RBC AUTO: 93.6 FL (ref 79–97)
MONOCYTES # BLD AUTO: 0.52 10*3/MM3 (ref 0.1–0.9)
MONOCYTES NFR BLD AUTO: 10.6 % (ref 5–12)
NEUTROPHILS NFR BLD AUTO: 2.74 10*3/MM3 (ref 1.7–7)
NEUTROPHILS NFR BLD AUTO: 55.8 % (ref 42.7–76)
NRBC BLD AUTO-RTO: 0 /100 WBC (ref 0–0.2)
PHOSPHATE SERPL-MCNC: 3.5 MG/DL (ref 2.5–4.5)
PLATELET # BLD AUTO: 146 10*3/MM3 (ref 140–450)
PMV BLD AUTO: 9.4 FL (ref 6–12)
POTASSIUM SERPL-SCNC: 4.7 MMOL/L (ref 3.5–5.2)
PROT ?TM UR-MCNC: 14.3 MG/DL
PROT SERPL-MCNC: 6 G/DL (ref 6–8.5)
PROT/CREAT UR: 0.14 MG/G{CREAT}
PTH-INTACT SERPL-MCNC: 62.9 PG/ML (ref 15–65)
RBC # BLD AUTO: 4.56 10*6/MM3 (ref 4.14–5.8)
SODIUM SERPL-SCNC: 139 MMOL/L (ref 136–145)
WBC NRBC COR # BLD: 4.91 10*3/MM3 (ref 3.4–10.8)

## 2023-08-11 PROCEDURE — 36415 COLL VENOUS BLD VENIPUNCTURE: CPT

## 2023-08-11 PROCEDURE — 80053 COMPREHEN METABOLIC PANEL: CPT

## 2023-08-11 PROCEDURE — 82570 ASSAY OF URINE CREATININE: CPT

## 2023-08-11 PROCEDURE — 84156 ASSAY OF PROTEIN URINE: CPT

## 2023-08-11 PROCEDURE — 82306 VITAMIN D 25 HYDROXY: CPT

## 2023-08-11 PROCEDURE — 83970 ASSAY OF PARATHORMONE: CPT

## 2023-08-11 PROCEDURE — 84100 ASSAY OF PHOSPHORUS: CPT

## 2023-08-11 PROCEDURE — 81001 URINALYSIS AUTO W/SCOPE: CPT

## 2023-08-11 PROCEDURE — 85025 COMPLETE CBC W/AUTO DIFF WBC: CPT

## 2023-08-11 PROCEDURE — 83036 HEMOGLOBIN GLYCOSYLATED A1C: CPT

## 2023-08-12 LAB
BACTERIA UR QL AUTO: NORMAL /HPF
BILIRUB UR QL STRIP: NEGATIVE
CLARITY UR: CLEAR
COLOR UR: YELLOW
GLUCOSE UR STRIP-MCNC: NEGATIVE MG/DL
HGB UR QL STRIP.AUTO: NEGATIVE
HYALINE CASTS UR QL AUTO: NORMAL /LPF
KETONES UR QL STRIP: NEGATIVE
LEUKOCYTE ESTERASE UR QL STRIP.AUTO: NEGATIVE
NITRITE UR QL STRIP: NEGATIVE
PH UR STRIP.AUTO: 7 [PH] (ref 5–8)
PROT UR QL STRIP: ABNORMAL
RBC # UR STRIP: NORMAL /HPF
REF LAB TEST METHOD: NORMAL
SP GR UR STRIP: 1.02 (ref 1–1.03)
SQUAMOUS #/AREA URNS HPF: NORMAL /HPF
UROBILINOGEN UR QL STRIP: ABNORMAL
WBC # UR STRIP: NORMAL /HPF

## 2023-08-14 ENCOUNTER — APPOINTMENT (OUTPATIENT)
Dept: CARDIAC REHAB | Facility: HOSPITAL | Age: 83
End: 2023-08-14

## 2023-08-17 ENCOUNTER — OFFICE VISIT (OUTPATIENT)
Dept: CARDIAC REHAB | Facility: HOSPITAL | Age: 83
End: 2023-08-17

## 2023-08-17 VITALS
WEIGHT: 244.71 LBS | BODY MASS INDEX: 33.19 KG/M2 | SYSTOLIC BLOOD PRESSURE: 110 MMHG | OXYGEN SATURATION: 95 % | DIASTOLIC BLOOD PRESSURE: 70 MMHG | HEART RATE: 66 BPM

## 2023-08-17 DIAGNOSIS — Z95.5 HISTORY OF HEART ARTERY STENT: Primary | ICD-10-CM

## 2023-08-21 ENCOUNTER — APPOINTMENT (OUTPATIENT)
Dept: CARDIAC REHAB | Facility: HOSPITAL | Age: 83
End: 2023-08-21

## 2023-08-22 ENCOUNTER — OFFICE VISIT (OUTPATIENT)
Dept: CARDIAC REHAB | Facility: HOSPITAL | Age: 83
End: 2023-08-22

## 2023-08-22 VITALS
OXYGEN SATURATION: 95 % | DIASTOLIC BLOOD PRESSURE: 70 MMHG | BODY MASS INDEX: 32.92 KG/M2 | HEART RATE: 71 BPM | SYSTOLIC BLOOD PRESSURE: 112 MMHG | WEIGHT: 242.73 LBS

## 2023-08-22 DIAGNOSIS — Z95.5 HISTORY OF HEART ARTERY STENT: Primary | ICD-10-CM

## 2023-08-24 ENCOUNTER — OFFICE VISIT (OUTPATIENT)
Dept: CARDIAC REHAB | Facility: HOSPITAL | Age: 83
End: 2023-08-24

## 2023-08-24 VITALS
DIASTOLIC BLOOD PRESSURE: 70 MMHG | SYSTOLIC BLOOD PRESSURE: 108 MMHG | OXYGEN SATURATION: 96 % | BODY MASS INDEX: 33.01 KG/M2 | WEIGHT: 243.39 LBS | HEART RATE: 72 BPM

## 2023-08-24 DIAGNOSIS — Z95.5 HISTORY OF HEART ARTERY STENT: Primary | ICD-10-CM

## 2023-08-28 ENCOUNTER — OFFICE VISIT (OUTPATIENT)
Dept: INTERNAL MEDICINE | Facility: CLINIC | Age: 83
End: 2023-08-28
Payer: MEDICARE

## 2023-08-28 ENCOUNTER — APPOINTMENT (OUTPATIENT)
Dept: CARDIAC REHAB | Facility: HOSPITAL | Age: 83
End: 2023-08-28

## 2023-08-28 VITALS
HEIGHT: 72 IN | BODY MASS INDEX: 33.05 KG/M2 | HEART RATE: 60 BPM | OXYGEN SATURATION: 94 % | SYSTOLIC BLOOD PRESSURE: 121 MMHG | TEMPERATURE: 97.6 F | WEIGHT: 244 LBS | DIASTOLIC BLOOD PRESSURE: 74 MMHG

## 2023-08-28 DIAGNOSIS — Z00.00 MEDICARE ANNUAL WELLNESS VISIT, SUBSEQUENT: Primary | ICD-10-CM

## 2023-08-28 DIAGNOSIS — N18.32 STAGE 3B CHRONIC KIDNEY DISEASE: ICD-10-CM

## 2023-08-28 DIAGNOSIS — I48.0 PAROXYSMAL ATRIAL FIBRILLATION: ICD-10-CM

## 2023-08-28 DIAGNOSIS — I25.10 CORONARY ARTERY DISEASE INVOLVING NATIVE CORONARY ARTERY OF NATIVE HEART WITHOUT ANGINA PECTORIS: ICD-10-CM

## 2023-08-28 DIAGNOSIS — E03.9 HYPOTHYROIDISM, UNSPECIFIED TYPE: ICD-10-CM

## 2023-08-28 DIAGNOSIS — Z12.5 SCREENING PSA (PROSTATE SPECIFIC ANTIGEN): ICD-10-CM

## 2023-08-28 DIAGNOSIS — E53.8 VITAMIN B 12 DEFICIENCY: ICD-10-CM

## 2023-08-28 DIAGNOSIS — I10 HYPERTENSION, ESSENTIAL: ICD-10-CM

## 2023-08-28 DIAGNOSIS — R73.01 IFG (IMPAIRED FASTING GLUCOSE): ICD-10-CM

## 2023-08-28 DIAGNOSIS — E78.2 MIXED HYPERLIPIDEMIA: ICD-10-CM

## 2023-08-28 NOTE — PROGRESS NOTES
"CHIEF COMPLAINT/ HPI:  Vitamin D Deficiency (Routine follow up, Lab follow up. Pt states no concerns at this time. )  Patient is here to follow-up with blood pressure kidney disease sugar issues in the past, says his belly is been doing better, he staying active, he had 1/60 anniversary wedding anniversary,            Objective   Vital Signs  Vitals:    08/28/23 0950   BP: 121/74   Pulse: 60   Temp: 97.6 øF (36.4 øC)   SpO2: 94%   Weight: 111 kg (244 lb)   Height: 182.9 cm (72.01\")      Body mass index is 33.08 kg/mý.  Review of Systems   Constitutional: Negative.    HENT: Negative.     Eyes: Negative.    Respiratory: Negative.     Cardiovascular: Negative.    Gastrointestinal: Negative.    Endocrine: Negative.    Genitourinary: Negative.    Musculoskeletal: Negative.    Allergic/Immunologic: Negative.    Neurological: Negative.    Hematological: Negative.    Psychiatric/Behavioral: Negative.      Physical Exam  Constitutional:       General: He is not in acute distress.     Appearance: Normal appearance. He is obese.   HENT:      Head: Normocephalic.      Mouth/Throat:      Mouth: Mucous membranes are moist.   Eyes:      Conjunctiva/sclera: Conjunctivae normal.      Pupils: Pupils are equal, round, and reactive to light.   Cardiovascular:      Rate and Rhythm: Normal rate and regular rhythm.      Pulses: Normal pulses.      Heart sounds: Normal heart sounds.   Pulmonary:      Effort: Pulmonary effort is normal.      Breath sounds: Normal breath sounds.   Abdominal:      General: Bowel sounds are normal.      Palpations: Abdomen is soft.   Musculoskeletal:         General: No swelling. Normal range of motion.      Cervical back: Neck supple.   Skin:     General: Skin is warm and dry.      Coloration: Skin is not jaundiced.   Neurological:      General: No focal deficit present.      Mental Status: He is alert and oriented to person, place, and time. Mental status is at baseline.   Psychiatric:         Mood and " Affect: Mood normal.         Behavior: Behavior normal.         Thought Content: Thought content normal.         Judgment: Judgment normal.      Result Review :   Lab Results   Component Value Date    PROBNP 30.7 09/14/2021    PROBNP 17.7 06/23/2021     CMP          2/7/2023    07:14 6/21/2023    07:03 8/11/2023    06:45   CMP   Glucose 117  120  118    BUN 28  20  21    Creatinine 1.74  1.69  1.42    EGFR 38.7  39.8  49.0    Sodium 138  139  139    Potassium 4.8  4.9  4.7    Chloride 102  104  104    Calcium 9.1  9.5  9.4    Total Protein 6.4  6.0  6.0    Albumin 4.0  4.2  4.1    Globulin 2.4  1.8  1.9    Total Bilirubin 0.3  0.3  0.4    Alkaline Phosphatase 74  64  65    AST (SGOT) 21  16  17    ALT (SGPT) 16  16  15    Albumin/Globulin Ratio 1.7  2.3  2.2    BUN/Creatinine Ratio 16.1  11.8  14.8    Anion Gap 9.0  7.0  11.9      CBC w/diff          11/30/2022    10:27 2/7/2023    07:14 8/11/2023    06:45   CBC w/Diff   WBC 5.94  5.60  4.91    RBC 4.52  4.16  4.56    Hemoglobin 14.1  13.2  14.4    Hematocrit 40.0  38.2  42.7    MCV 88.5  91.8  93.6    MCH 31.2  31.7  31.6    MCHC 35.3  34.6  33.7    RDW 12.7  12.9  13.6    Platelets 131  121  146    Neutrophil Rel % 59.0  56.3  55.8    Immature Granulocyte Rel % 0.5  0.4  0.2    Lymphocyte Rel % 25.1  26.1  26.7    Monocyte Rel % 9.6  9.3  10.6    Eosinophil Rel % 5.1  7.0  5.9    Basophil Rel % 0.7  0.9  0.8       Lipid Panel          2/7/2023    07:14 6/21/2023    07:03   Lipid Panel   Total Cholesterol 141  124    Triglycerides 327  311    HDL Cholesterol 34  30    VLDL Cholesterol 51  48    LDL Cholesterol  56  46    LDL/HDL Ratio 1.22  1.06       Lab Results   Component Value Date    TSH 3.140 02/07/2023    TSH 3.150 08/15/2022    TSH 3.890 03/07/2022      Lab Results   Component Value Date    FREET4 1.26 02/07/2023    FREET4 1.36 08/15/2022    FREET4 1.25 03/07/2022      A1C Last 3 Results          8/11/2023    06:45   HGBA1C Last 3 Results   Hemoglobin A1C  5.60       PSA          2/7/2023    07:14   PSA   PSA 0.104                     Visit Diagnoses:    ICD-10-CM ICD-9-CM   1. Medicare annual wellness visit, subsequent  Z00.00 V70.0   2. Stage 3b chronic kidney disease  N18.32 585.3   3. Vitamin B 12 deficiency  E53.8 266.2   4. Coronary artery disease involving native coronary artery of native heart without angina pectoris  I25.10 414.01   5. Hypothyroidism, unspecified type  E03.9 244.9   6. Hypertension, essential  I10 401.9   7. Mixed hyperlipidemia  E78.2 272.2   8. Paroxysmal atrial fibrillation  I48.0 427.31   9. IFG  R73.01 790.21   10. Screening PSA   Z12.5 V76.44       Assessment and Plan   Diagnoses and all orders for this visit:    1. Medicare annual wellness visit, subsequent (Primary)  -     Comprehensive Metabolic Panel; Future  -     CBC & Differential; Future  -     Lipid Panel; Future  -     PSA Screen; Future  -     TSH+Free T4; Future  -     Hemoglobin A1c; Future    2. Stage 3b chronic kidney disease  -     Comprehensive Metabolic Panel; Future  -     CBC & Differential; Future  -     Lipid Panel; Future  -     PSA Screen; Future  -     TSH+Free T4; Future  -     Hemoglobin A1c; Future    3. Vitamin B 12 deficiency  -     Comprehensive Metabolic Panel; Future  -     CBC & Differential; Future  -     Lipid Panel; Future  -     PSA Screen; Future  -     TSH+Free T4; Future  -     Hemoglobin A1c; Future    4. Coronary artery disease involving native coronary artery of native heart without angina pectoris  -     Comprehensive Metabolic Panel; Future  -     CBC & Differential; Future  -     Lipid Panel; Future  -     PSA Screen; Future  -     TSH+Free T4; Future  -     Hemoglobin A1c; Future    5. Hypothyroidism, unspecified type  -     Comprehensive Metabolic Panel; Future  -     CBC & Differential; Future  -     Lipid Panel; Future  -     PSA Screen; Future  -     TSH+Free T4; Future  -     Hemoglobin A1c; Future    6. Hypertension, essential  -      Comprehensive Metabolic Panel; Future  -     CBC & Differential; Future  -     Lipid Panel; Future  -     PSA Screen; Future  -     TSH+Free T4; Future  -     Hemoglobin A1c; Future    7. Mixed hyperlipidemia  -     Comprehensive Metabolic Panel; Future  -     CBC & Differential; Future  -     Lipid Panel; Future  -     PSA Screen; Future  -     TSH+Free T4; Future  -     Hemoglobin A1c; Future    8. Paroxysmal atrial fibrillation  -     Comprehensive Metabolic Panel; Future  -     CBC & Differential; Future  -     Lipid Panel; Future  -     PSA Screen; Future  -     TSH+Free T4; Future  -     Hemoglobin A1c; Future    9. IFG  -     Comprehensive Metabolic Panel; Future  -     CBC & Differential; Future  -     Lipid Panel; Future  -     PSA Screen; Future  -     TSH+Free T4; Future  -     Hemoglobin A1c; Future    10. Screening PSA   -     Comprehensive Metabolic Panel; Future  -     CBC & Differential; Future  -     Lipid Panel; Future  -     PSA Screen; Future  -     TSH+Free T4; Future  -     Hemoglobin A1c; Future             Right flank pain,abd pain ---- previous CT scan December 15, 2022 showed mild diverticulosis of the descending colon and sigmoid colon no evidence of infection small inguinal hernias containing fat are stable --- chest x-ray, right rib series, showed no active disease November 30, 2022,     Abdominal pains, right flank pain, ---CT scan unremarkable February 2022,, and as above, December 15, 2022    Impaired fasting glucose blood sugar 117 counseled on diet walking low-carb diet February 20, 2023, hemoglobin A1c 5.6 August 2023    CKD stage IIIa, creatinine 1.4-1.8 -seeing dr hernández now aug 2022    Coronary artery disease previous stent placement 2007, continues aspirin,    Insomnia, continues  Elavil 75 mg tablet ---half tablet at bedtime,    Hypertension continues on Norvasc 10 mg daily, irbesartan 300 mg daily    Lower extremity edema ---continues Lasix 20 mg daily    Vitamin D and B12  deficiency continues replacement    Hyperlipidemia continues Lipitor 80 mg daily,, Vascepa 1 g 2 tablets twice a day,    Hypothyroidism continues  levothyroxine 50 mcg daily    Paroxysmal atrial fibrillation, continues  sotalol 40 mg twice a day----seeing dr martinez     Mild thrombocytopenia, chronic, stable at 109, will follow-up again in 3 months, as of August 2022    psa =0.10 February 7, 2023---, dr olivarez prostate CA, seed implants, 2012,    Overweight, discussed weight loss efforts,          Follow Up   Return in about 6 months (around 2/28/2024).  Patient was given instructions and counseling regarding his condition or for health maintenance advice. Please see specific information pulled into the AVS if appropriate.

## 2023-08-28 NOTE — PROGRESS NOTES
The ABCs of the Annual Wellness Visit  Subsequent Medicare Wellness Visit    Subjective      Michael Sol is a 83 y.o. male who presents for a Subsequent Medicare Wellness Visit.    The following portions of the patient's history were reviewed and   updated as appropriate: allergies, current medications, past family history, past medical history, past social history, past surgical history, and problem list.    Compared to one year ago, the patient feels his physical   health is the same.    Compared to one year ago, the patient feels his mental   health is the same.    Recent Hospitalizations:  He was not admitted to the hospital during the last year.       Current Medical Providers:  Patient Care Team:  Pablo Salazar MD as PCP - General (Internal Medicine)    Outpatient Medications Prior to Visit   Medication Sig Dispense Refill    amitriptyline (ELAVIL) 75 MG tablet Take 0.5 tablets by mouth Every Night.      amLODIPine (NORVASC) 10 MG tablet Take 1 tablet by mouth Daily.      aspirin 81 MG EC tablet Take 1 tablet by mouth Daily.      atorvastatin (LIPITOR) 80 MG tablet Take 1 tablet by mouth Daily. 90 tablet 3    cholecalciferol (VITAMIN D3) 25 MCG (1000 UT) tablet Take 1 tablet by mouth Daily.      cyanocobalamin 1000 MCG/ML injection Inject 1 mL into the appropriate muscle as directed by prescriber Every 28 (Twenty-Eight) Days.      ezetimibe (ZETIA) 10 MG tablet Take 1 tablet by mouth Daily. 90 tablet 3    furosemide (LASIX) 20 MG tablet Take 1 tablet by mouth Daily. 1 tab six days a week. 90 tablet 3    irbesartan (AVAPRO) 300 MG tablet TAKE 1 TABLET BY MOUTH EVERY NIGHT 90 tablet 0    levothyroxine (SYNTHROID, LEVOTHROID) 50 MCG tablet Take 1 tablet by mouth Daily.      pantoprazole (Protonix) 40 MG EC tablet Take 1 tablet by mouth Daily. 60 tablet 3    sotalol (BETAPACE) 80 MG tablet Take 0.5 tablets by mouth 2 (Two) Times a Day.      spironolactone (ALDACTONE) 25 MG tablet Take 25 mg by  "mouth.       Facility-Administered Medications Prior to Visit   Medication Dose Route Frequency Provider Last Rate Last Admin    cyanocobalamin injection 1,000 mcg  1,000 mcg Intramuscular Q28 Days Pablo Salazar MD   1,000 mcg at 08/15/22 1622       No opioid medication identified on active medication list. I have reviewed chart for other potential  high risk medication/s and harmful drug interactions in the elderly.        Aspirin is on active medication list. Aspirin use is indicated based on review of current medical condition/s. Pros and cons of this therapy have been discussed today. Benefits of this medication outweigh potential harm.  Patient has been encouraged to continue taking this medication.  .      Patient Active Problem List   Diagnosis    Hypertension, essential    Mixed hyperlipidemia    Hypothyroidism    History of prostate cancer    Diverticulosis    Esophageal reflux    Colon polyps    Coronary artery disease involving native coronary artery of native heart without angina pectoris    Brain aneurysm    Blepharitis    Atrial fibrillation    Vitamin B 12 deficiency    Stage 3b chronic kidney disease    Urgency incontinence    Abdominal pain, generalized    Vitamin D deficiency    Right upper quadrant abdominal pain    Rt flank pain    Intercostal pain    Medicare annual wellness visit, subsequent    Screening PSA     IFG     Advance Care Planning   Advance Care Planning     Advance Directive is on file.  ACP discussion was held with the patient during this visit. Patient has an advance directive in EMR which is still valid.      Objective    Vitals:    08/28/23 0950   BP: 121/74   Pulse: 60   Temp: 97.6 øF (36.4 øC)   SpO2: 94%   Weight: 111 kg (244 lb)   Height: 182.9 cm (72.01\")     Estimated body mass index is 33.08 kg/mý as calculated from the following:    Height as of this encounter: 182.9 cm (72.01\").    Weight as of this encounter: 111 kg (244 lb).    BMI is >= 30 and <35. (Class 1 " Obesity). The following options were offered after discussion;: weight loss educational material (shared in after visit summary), exercise counseling/recommendations, and nutrition counseling/recommendations      Does the patient have evidence of cognitive impairment?   No    Lab Results   Component Value Date    TRIG 311 (H) 2023    HDL 30 (L) 2023    LDL 46 2023    VLDL 48 (H) 2023    HGBA1C 5.60 2023          HEALTH RISK ASSESSMENT    Smoking Status:  Social History     Tobacco Use   Smoking Status Former    Packs/day: 1.00    Years: 20.00    Pack years: 20.00    Types: Cigarettes    Quit date:     Years since quittin.6   Smokeless Tobacco Never     Alcohol Consumption:  Social History     Substance and Sexual Activity   Alcohol Use Yes     Fall Risk Screen:    BRISSA Fall Risk Assessment was completed, and patient is at LOW risk for falls.Assessment completed on:2023    Depression Screenin/20/2023     8:47 AM   PHQ-2/PHQ-9 Depression Screening   Little Interest or Pleasure in Doing Things 0-->not at all   Feeling Down, Depressed or Hopeless 0-->not at all   PHQ-9: Brief Depression Severity Measure Score 0       Health Habits and Functional and Cognitive Screenin/28/2023     9:00 AM   Functional & Cognitive Status   Do you have difficulty preparing food and eating? No   Do you have difficulty bathing yourself, getting dressed or grooming yourself? No   Do you have difficulty using the toilet? No   Do you have difficulty moving around from place to place? No   Do you have trouble with steps or getting out of a bed or a chair? No   Do you need help using the phone?  No   Are you deaf or do you have serious difficulty hearing?  No   Do you need help to go to places out of walking distance? No   Do you need help shopping? No   Do you need help preparing meals?  No   Do you need help with housework?  No   Do you need help with laundry? No   Do you need help  taking your medications? No   Do you need help managing money? No   Do you ever drive or ride in a car without wearing a seat belt? No   Do you have difficulty concentrating, remembering or making decisions? No       Age-appropriate Screening Schedule:  Refer to the list below for future screening recommendations based on patient's age, sex and/or medical conditions. Orders for these recommended tests are listed in the plan section. The patient has been provided with a written plan.    Health Maintenance   Topic Date Due    TDAP/TD VACCINES (1 - Tdap) Never done    ZOSTER VACCINE (1 of 2) Never done    Pneumococcal Vaccine 65+ (2 - PPSV23 or PCV20) 08/16/2018    COVID-19 Vaccine (3 - Moderna series) 04/15/2021    INFLUENZA VACCINE  10/01/2023    COLORECTAL CANCER SCREENING  04/22/2024    LIPID PANEL  06/21/2024    ANNUAL WELLNESS VISIT  08/28/2024                  CMS Preventative Services Quick Reference  Risk Factors Identified During Encounter:    None Identified    The above risks/problems have been discussed with the patient.  Pertinent information has been shared with the patient in the After Visit Summary.    Diagnoses and all orders for this visit:    1. Medicare annual wellness visit, subsequent (Primary)  -     Comprehensive Metabolic Panel; Future  -     CBC & Differential; Future  -     Lipid Panel; Future  -     PSA Screen; Future  -     TSH+Free T4; Future  -     Hemoglobin A1c; Future    2. Stage 3b chronic kidney disease  -     Comprehensive Metabolic Panel; Future  -     CBC & Differential; Future  -     Lipid Panel; Future  -     PSA Screen; Future  -     TSH+Free T4; Future  -     Hemoglobin A1c; Future    3. Vitamin B 12 deficiency  -     Comprehensive Metabolic Panel; Future  -     CBC & Differential; Future  -     Lipid Panel; Future  -     PSA Screen; Future  -     TSH+Free T4; Future  -     Hemoglobin A1c; Future    4. Coronary artery disease involving native coronary artery of native heart  without angina pectoris  -     Comprehensive Metabolic Panel; Future  -     CBC & Differential; Future  -     Lipid Panel; Future  -     PSA Screen; Future  -     TSH+Free T4; Future  -     Hemoglobin A1c; Future    5. Hypothyroidism, unspecified type  -     Comprehensive Metabolic Panel; Future  -     CBC & Differential; Future  -     Lipid Panel; Future  -     PSA Screen; Future  -     TSH+Free T4; Future  -     Hemoglobin A1c; Future    6. Hypertension, essential  -     Comprehensive Metabolic Panel; Future  -     CBC & Differential; Future  -     Lipid Panel; Future  -     PSA Screen; Future  -     TSH+Free T4; Future  -     Hemoglobin A1c; Future    7. Mixed hyperlipidemia  -     Comprehensive Metabolic Panel; Future  -     CBC & Differential; Future  -     Lipid Panel; Future  -     PSA Screen; Future  -     TSH+Free T4; Future  -     Hemoglobin A1c; Future    8. Paroxysmal atrial fibrillation  -     Comprehensive Metabolic Panel; Future  -     CBC & Differential; Future  -     Lipid Panel; Future  -     PSA Screen; Future  -     TSH+Free T4; Future  -     Hemoglobin A1c; Future    9. IFG  -     Comprehensive Metabolic Panel; Future  -     CBC & Differential; Future  -     Lipid Panel; Future  -     PSA Screen; Future  -     TSH+Free T4; Future  -     Hemoglobin A1c; Future    10. Screening PSA   -     Comprehensive Metabolic Panel; Future  -     CBC & Differential; Future  -     Lipid Panel; Future  -     PSA Screen; Future  -     TSH+Free T4; Future  -     Hemoglobin A1c; Future        Follow Up:   Next Medicare Wellness visit to be scheduled in 1 year.      An After Visit Summary and PPPS were made available to the patient.

## 2023-08-29 ENCOUNTER — OFFICE VISIT (OUTPATIENT)
Dept: CARDIAC REHAB | Facility: HOSPITAL | Age: 83
End: 2023-08-29

## 2023-08-29 VITALS
SYSTOLIC BLOOD PRESSURE: 140 MMHG | WEIGHT: 245.59 LBS | OXYGEN SATURATION: 95 % | DIASTOLIC BLOOD PRESSURE: 70 MMHG | BODY MASS INDEX: 33.3 KG/M2 | HEART RATE: 65 BPM

## 2023-08-29 DIAGNOSIS — Z95.5 HISTORY OF HEART ARTERY STENT: Primary | ICD-10-CM

## 2023-08-31 ENCOUNTER — OFFICE VISIT (OUTPATIENT)
Dept: CARDIAC REHAB | Facility: HOSPITAL | Age: 83
End: 2023-08-31

## 2023-08-31 VITALS
OXYGEN SATURATION: 96 % | WEIGHT: 246.25 LBS | BODY MASS INDEX: 33.39 KG/M2 | DIASTOLIC BLOOD PRESSURE: 60 MMHG | HEART RATE: 73 BPM | SYSTOLIC BLOOD PRESSURE: 110 MMHG

## 2023-08-31 DIAGNOSIS — Z95.5 HISTORY OF HEART ARTERY STENT: Primary | ICD-10-CM

## 2023-09-05 ENCOUNTER — OFFICE VISIT (OUTPATIENT)
Dept: CARDIAC REHAB | Facility: HOSPITAL | Age: 83
End: 2023-09-05

## 2023-09-05 VITALS
WEIGHT: 246.03 LBS | HEART RATE: 66 BPM | DIASTOLIC BLOOD PRESSURE: 82 MMHG | OXYGEN SATURATION: 97 % | SYSTOLIC BLOOD PRESSURE: 122 MMHG | BODY MASS INDEX: 33.36 KG/M2

## 2023-09-05 DIAGNOSIS — Z95.5 HISTORY OF HEART ARTERY STENT: Primary | ICD-10-CM

## 2023-09-07 ENCOUNTER — OFFICE VISIT (OUTPATIENT)
Dept: CARDIAC REHAB | Facility: HOSPITAL | Age: 83
End: 2023-09-07

## 2023-09-07 VITALS
OXYGEN SATURATION: 96 % | WEIGHT: 243.17 LBS | DIASTOLIC BLOOD PRESSURE: 60 MMHG | BODY MASS INDEX: 32.97 KG/M2 | HEART RATE: 66 BPM | SYSTOLIC BLOOD PRESSURE: 118 MMHG

## 2023-09-07 DIAGNOSIS — Z95.5 HISTORY OF HEART ARTERY STENT: Primary | ICD-10-CM

## 2023-09-12 ENCOUNTER — APPOINTMENT (OUTPATIENT)
Dept: CARDIAC REHAB | Facility: HOSPITAL | Age: 83
End: 2023-09-12

## 2023-09-26 ENCOUNTER — OFFICE VISIT (OUTPATIENT)
Dept: CARDIAC REHAB | Facility: HOSPITAL | Age: 83
End: 2023-09-26

## 2023-09-26 VITALS
SYSTOLIC BLOOD PRESSURE: 124 MMHG | BODY MASS INDEX: 33.21 KG/M2 | WEIGHT: 244.93 LBS | DIASTOLIC BLOOD PRESSURE: 72 MMHG | HEART RATE: 74 BPM | OXYGEN SATURATION: 96 %

## 2023-09-26 DIAGNOSIS — Z95.5 HISTORY OF HEART ARTERY STENT: Primary | ICD-10-CM

## 2023-10-03 ENCOUNTER — OFFICE VISIT (OUTPATIENT)
Dept: CARDIAC REHAB | Facility: HOSPITAL | Age: 83
End: 2023-10-03

## 2023-10-03 VITALS
DIASTOLIC BLOOD PRESSURE: 80 MMHG | HEART RATE: 67 BPM | WEIGHT: 241.62 LBS | BODY MASS INDEX: 32.76 KG/M2 | OXYGEN SATURATION: 98 % | SYSTOLIC BLOOD PRESSURE: 130 MMHG

## 2023-10-03 DIAGNOSIS — Z95.5 HISTORY OF HEART ARTERY STENT: Primary | ICD-10-CM

## 2023-10-17 ENCOUNTER — OFFICE VISIT (OUTPATIENT)
Dept: CARDIAC REHAB | Facility: HOSPITAL | Age: 83
End: 2023-10-17

## 2023-10-17 VITALS
DIASTOLIC BLOOD PRESSURE: 80 MMHG | BODY MASS INDEX: 32.94 KG/M2 | HEART RATE: 70 BPM | WEIGHT: 242.95 LBS | OXYGEN SATURATION: 97 % | SYSTOLIC BLOOD PRESSURE: 132 MMHG

## 2023-10-17 DIAGNOSIS — Z95.5 HISTORY OF HEART ARTERY STENT: Primary | ICD-10-CM

## 2023-10-24 ENCOUNTER — OFFICE VISIT (OUTPATIENT)
Dept: CARDIAC REHAB | Facility: HOSPITAL | Age: 83
End: 2023-10-24

## 2023-10-24 VITALS
BODY MASS INDEX: 32.97 KG/M2 | DIASTOLIC BLOOD PRESSURE: 82 MMHG | SYSTOLIC BLOOD PRESSURE: 120 MMHG | HEART RATE: 58 BPM | WEIGHT: 243.17 LBS | OXYGEN SATURATION: 98 %

## 2023-10-24 DIAGNOSIS — Z95.5 HISTORY OF HEART ARTERY STENT: Primary | ICD-10-CM

## 2023-10-26 ENCOUNTER — OFFICE VISIT (OUTPATIENT)
Dept: CARDIAC REHAB | Facility: HOSPITAL | Age: 83
End: 2023-10-26

## 2023-10-26 VITALS
DIASTOLIC BLOOD PRESSURE: 80 MMHG | OXYGEN SATURATION: 96 % | SYSTOLIC BLOOD PRESSURE: 120 MMHG | BODY MASS INDEX: 33.45 KG/M2 | HEART RATE: 64 BPM | WEIGHT: 246.69 LBS

## 2023-10-26 DIAGNOSIS — Z95.5 HISTORY OF HEART ARTERY STENT: Primary | ICD-10-CM

## 2023-10-31 ENCOUNTER — OFFICE VISIT (OUTPATIENT)
Dept: CARDIAC REHAB | Facility: HOSPITAL | Age: 83
End: 2023-10-31

## 2023-10-31 VITALS
WEIGHT: 240.3 LBS | SYSTOLIC BLOOD PRESSURE: 122 MMHG | DIASTOLIC BLOOD PRESSURE: 78 MMHG | OXYGEN SATURATION: 95 % | BODY MASS INDEX: 32.58 KG/M2 | HEART RATE: 64 BPM

## 2023-10-31 DIAGNOSIS — Z95.5 HISTORY OF HEART ARTERY STENT: Primary | ICD-10-CM

## 2023-11-02 ENCOUNTER — OFFICE VISIT (OUTPATIENT)
Dept: CARDIAC REHAB | Facility: HOSPITAL | Age: 83
End: 2023-11-02

## 2023-11-02 VITALS
HEART RATE: 65 BPM | DIASTOLIC BLOOD PRESSURE: 68 MMHG | WEIGHT: 242.51 LBS | BODY MASS INDEX: 32.88 KG/M2 | SYSTOLIC BLOOD PRESSURE: 126 MMHG | OXYGEN SATURATION: 96 %

## 2023-11-02 DIAGNOSIS — Z95.5 HISTORY OF HEART ARTERY STENT: Primary | ICD-10-CM

## 2023-11-09 ENCOUNTER — OFFICE VISIT (OUTPATIENT)
Dept: CARDIAC REHAB | Facility: HOSPITAL | Age: 83
End: 2023-11-09

## 2023-11-09 VITALS
OXYGEN SATURATION: 96 % | SYSTOLIC BLOOD PRESSURE: 122 MMHG | DIASTOLIC BLOOD PRESSURE: 82 MMHG | BODY MASS INDEX: 33.03 KG/M2 | WEIGHT: 243.61 LBS | HEART RATE: 68 BPM

## 2023-11-09 DIAGNOSIS — Z95.5 HISTORY OF HEART ARTERY STENT: Primary | ICD-10-CM

## 2023-11-14 ENCOUNTER — OFFICE VISIT (OUTPATIENT)
Dept: CARDIAC REHAB | Facility: HOSPITAL | Age: 83
End: 2023-11-14

## 2023-11-14 VITALS
DIASTOLIC BLOOD PRESSURE: 82 MMHG | WEIGHT: 243.83 LBS | SYSTOLIC BLOOD PRESSURE: 136 MMHG | OXYGEN SATURATION: 98 % | BODY MASS INDEX: 33.06 KG/M2 | HEART RATE: 60 BPM

## 2023-11-14 DIAGNOSIS — Z95.5 HISTORY OF HEART ARTERY STENT: Primary | ICD-10-CM

## 2023-11-21 ENCOUNTER — OFFICE VISIT (OUTPATIENT)
Dept: CARDIAC REHAB | Facility: HOSPITAL | Age: 83
End: 2023-11-21

## 2023-11-21 VITALS
HEART RATE: 67 BPM | BODY MASS INDEX: 32.88 KG/M2 | OXYGEN SATURATION: 98 % | DIASTOLIC BLOOD PRESSURE: 70 MMHG | RESPIRATION RATE: 18 BRPM | WEIGHT: 242.51 LBS | SYSTOLIC BLOOD PRESSURE: 130 MMHG

## 2023-11-21 DIAGNOSIS — Z95.5 HISTORY OF HEART ARTERY STENT: Primary | ICD-10-CM

## 2023-11-28 ENCOUNTER — OFFICE VISIT (OUTPATIENT)
Dept: CARDIAC REHAB | Facility: HOSPITAL | Age: 83
End: 2023-11-28

## 2023-11-28 VITALS
HEART RATE: 60 BPM | WEIGHT: 241.84 LBS | BODY MASS INDEX: 32.79 KG/M2 | SYSTOLIC BLOOD PRESSURE: 122 MMHG | DIASTOLIC BLOOD PRESSURE: 70 MMHG | OXYGEN SATURATION: 97 %

## 2023-11-28 DIAGNOSIS — Z95.5 HISTORY OF HEART ARTERY STENT: Primary | ICD-10-CM

## 2023-11-30 ENCOUNTER — OFFICE VISIT (OUTPATIENT)
Dept: CARDIAC REHAB | Facility: HOSPITAL | Age: 83
End: 2023-11-30

## 2023-11-30 VITALS
HEART RATE: 72 BPM | OXYGEN SATURATION: 95 % | BODY MASS INDEX: 32.88 KG/M2 | DIASTOLIC BLOOD PRESSURE: 70 MMHG | SYSTOLIC BLOOD PRESSURE: 118 MMHG | WEIGHT: 242.51 LBS

## 2023-11-30 DIAGNOSIS — Z95.5 HISTORY OF HEART ARTERY STENT: Primary | ICD-10-CM

## 2023-12-07 ENCOUNTER — OFFICE VISIT (OUTPATIENT)
Dept: CARDIAC REHAB | Facility: HOSPITAL | Age: 83
End: 2023-12-07

## 2023-12-07 VITALS
HEART RATE: 63 BPM | WEIGHT: 245.37 LBS | DIASTOLIC BLOOD PRESSURE: 72 MMHG | SYSTOLIC BLOOD PRESSURE: 128 MMHG | OXYGEN SATURATION: 97 % | BODY MASS INDEX: 33.27 KG/M2

## 2023-12-07 DIAGNOSIS — Z95.5 HISTORY OF HEART ARTERY STENT: Primary | ICD-10-CM

## 2023-12-12 ENCOUNTER — OFFICE VISIT (OUTPATIENT)
Dept: CARDIAC REHAB | Facility: HOSPITAL | Age: 83
End: 2023-12-12

## 2023-12-12 VITALS
WEIGHT: 244.27 LBS | BODY MASS INDEX: 33.12 KG/M2 | HEART RATE: 68 BPM | SYSTOLIC BLOOD PRESSURE: 118 MMHG | DIASTOLIC BLOOD PRESSURE: 80 MMHG | OXYGEN SATURATION: 97 %

## 2023-12-12 DIAGNOSIS — Z95.5 HISTORY OF HEART ARTERY STENT: Primary | ICD-10-CM

## 2023-12-14 ENCOUNTER — OFFICE VISIT (OUTPATIENT)
Dept: CARDIAC REHAB | Facility: HOSPITAL | Age: 83
End: 2023-12-14

## 2023-12-14 VITALS
SYSTOLIC BLOOD PRESSURE: 118 MMHG | HEART RATE: 68 BPM | OXYGEN SATURATION: 98 % | DIASTOLIC BLOOD PRESSURE: 68 MMHG | BODY MASS INDEX: 33 KG/M2 | WEIGHT: 243.39 LBS

## 2023-12-14 DIAGNOSIS — Z95.5 HISTORY OF HEART ARTERY STENT: Primary | ICD-10-CM

## 2023-12-19 ENCOUNTER — OFFICE VISIT (OUTPATIENT)
Dept: CARDIAC REHAB | Facility: HOSPITAL | Age: 83
End: 2023-12-19

## 2023-12-19 VITALS
HEART RATE: 65 BPM | DIASTOLIC BLOOD PRESSURE: 80 MMHG | OXYGEN SATURATION: 97 % | WEIGHT: 243.39 LBS | BODY MASS INDEX: 33 KG/M2 | SYSTOLIC BLOOD PRESSURE: 138 MMHG

## 2023-12-19 DIAGNOSIS — Z95.5 HISTORY OF HEART ARTERY STENT: Primary | ICD-10-CM

## 2023-12-21 ENCOUNTER — OFFICE VISIT (OUTPATIENT)
Dept: CARDIAC REHAB | Facility: HOSPITAL | Age: 83
End: 2023-12-21

## 2023-12-21 VITALS
SYSTOLIC BLOOD PRESSURE: 130 MMHG | DIASTOLIC BLOOD PRESSURE: 70 MMHG | WEIGHT: 243.39 LBS | HEART RATE: 70 BPM | BODY MASS INDEX: 33 KG/M2

## 2023-12-21 DIAGNOSIS — Z95.5 HISTORY OF HEART ARTERY STENT: Primary | ICD-10-CM

## 2024-01-02 ENCOUNTER — OFFICE VISIT (OUTPATIENT)
Dept: CARDIAC REHAB | Facility: HOSPITAL | Age: 84
End: 2024-01-02

## 2024-01-02 VITALS
HEART RATE: 76 BPM | BODY MASS INDEX: 33.3 KG/M2 | WEIGHT: 245.59 LBS | SYSTOLIC BLOOD PRESSURE: 124 MMHG | OXYGEN SATURATION: 97 % | DIASTOLIC BLOOD PRESSURE: 80 MMHG

## 2024-01-02 DIAGNOSIS — Z95.5 HISTORY OF HEART ARTERY STENT: Primary | ICD-10-CM

## 2024-01-04 ENCOUNTER — OFFICE VISIT (OUTPATIENT)
Dept: CARDIAC REHAB | Facility: HOSPITAL | Age: 84
End: 2024-01-04

## 2024-01-04 VITALS
HEART RATE: 69 BPM | BODY MASS INDEX: 33.33 KG/M2 | OXYGEN SATURATION: 97 % | DIASTOLIC BLOOD PRESSURE: 78 MMHG | WEIGHT: 245.81 LBS | SYSTOLIC BLOOD PRESSURE: 128 MMHG

## 2024-01-04 DIAGNOSIS — Z95.5 HISTORY OF HEART ARTERY STENT: Primary | ICD-10-CM

## 2024-01-09 ENCOUNTER — OFFICE VISIT (OUTPATIENT)
Dept: CARDIAC REHAB | Facility: HOSPITAL | Age: 84
End: 2024-01-09

## 2024-01-09 VITALS
HEART RATE: 69 BPM | WEIGHT: 244.93 LBS | BODY MASS INDEX: 33.21 KG/M2 | DIASTOLIC BLOOD PRESSURE: 80 MMHG | SYSTOLIC BLOOD PRESSURE: 140 MMHG | OXYGEN SATURATION: 96 %

## 2024-01-09 DIAGNOSIS — Z95.5 HISTORY OF HEART ARTERY STENT: Primary | ICD-10-CM

## 2024-01-11 ENCOUNTER — OFFICE VISIT (OUTPATIENT)
Dept: CARDIAC REHAB | Facility: HOSPITAL | Age: 84
End: 2024-01-11

## 2024-01-11 VITALS
WEIGHT: 245.37 LBS | OXYGEN SATURATION: 97 % | DIASTOLIC BLOOD PRESSURE: 66 MMHG | BODY MASS INDEX: 33.27 KG/M2 | HEART RATE: 71 BPM | SYSTOLIC BLOOD PRESSURE: 128 MMHG

## 2024-01-11 DIAGNOSIS — Z95.5 HISTORY OF HEART ARTERY STENT: Primary | ICD-10-CM

## 2024-01-16 ENCOUNTER — OFFICE VISIT (OUTPATIENT)
Dept: CARDIAC REHAB | Facility: HOSPITAL | Age: 84
End: 2024-01-16

## 2024-01-16 VITALS
OXYGEN SATURATION: 96 % | SYSTOLIC BLOOD PRESSURE: 140 MMHG | DIASTOLIC BLOOD PRESSURE: 80 MMHG | WEIGHT: 242.51 LBS | BODY MASS INDEX: 32.88 KG/M2 | HEART RATE: 76 BPM

## 2024-01-16 DIAGNOSIS — Z95.5 HISTORY OF HEART ARTERY STENT: Primary | ICD-10-CM

## 2024-01-18 ENCOUNTER — OFFICE VISIT (OUTPATIENT)
Dept: CARDIAC REHAB | Facility: HOSPITAL | Age: 84
End: 2024-01-18

## 2024-01-18 VITALS
SYSTOLIC BLOOD PRESSURE: 121 MMHG | HEART RATE: 74 BPM | OXYGEN SATURATION: 95 % | WEIGHT: 244.71 LBS | DIASTOLIC BLOOD PRESSURE: 90 MMHG | BODY MASS INDEX: 33.18 KG/M2

## 2024-01-18 DIAGNOSIS — Z95.5 HISTORY OF HEART ARTERY STENT: Primary | ICD-10-CM

## 2024-01-22 ENCOUNTER — TRANSCRIBE ORDERS (OUTPATIENT)
Dept: LAB | Facility: HOSPITAL | Age: 84
End: 2024-01-22
Payer: MEDICARE

## 2024-01-22 DIAGNOSIS — N18.32 CHRONIC KIDNEY DISEASE (CKD) STAGE G3B/A1, MODERATELY DECREASED GLOMERULAR FILTRATION RATE (GFR) BETWEEN 30-44 ML/MIN/1.73 SQUARE METER AND ALBUMINURIA CREATININE RATIO LESS THAN 30 MG/G (CMS/H*: Primary | ICD-10-CM

## 2024-01-22 DIAGNOSIS — I10 HYPERTENSION, ESSENTIAL: ICD-10-CM

## 2024-01-22 DIAGNOSIS — I48.91 ATRIAL FIBRILLATION, UNSPECIFIED TYPE: ICD-10-CM

## 2024-01-22 DIAGNOSIS — E55.9 VITAMIN D DEFICIENCY: ICD-10-CM

## 2024-01-22 DIAGNOSIS — E03.9 HYPOTHYROIDISM, UNSPECIFIED TYPE: ICD-10-CM

## 2024-01-22 DIAGNOSIS — I25.10 DISEASE OF CARDIOVASCULAR SYSTEM: ICD-10-CM

## 2024-01-22 DIAGNOSIS — K21.9 CARDIOCHALASIA: ICD-10-CM

## 2024-01-23 ENCOUNTER — APPOINTMENT (OUTPATIENT)
Dept: CARDIAC REHAB | Facility: HOSPITAL | Age: 84
End: 2024-01-23

## 2024-01-30 ENCOUNTER — OFFICE VISIT (OUTPATIENT)
Dept: CARDIAC REHAB | Facility: HOSPITAL | Age: 84
End: 2024-01-30
Payer: MEDICARE

## 2024-01-30 VITALS
OXYGEN SATURATION: 94 % | SYSTOLIC BLOOD PRESSURE: 112 MMHG | BODY MASS INDEX: 33.45 KG/M2 | WEIGHT: 246.69 LBS | DIASTOLIC BLOOD PRESSURE: 62 MMHG | HEART RATE: 66 BPM

## 2024-01-30 DIAGNOSIS — Z95.5 HISTORY OF HEART ARTERY STENT: Primary | ICD-10-CM

## 2024-02-01 ENCOUNTER — OFFICE VISIT (OUTPATIENT)
Dept: CARDIAC REHAB | Facility: HOSPITAL | Age: 84
End: 2024-02-01
Payer: MEDICARE

## 2024-02-01 ENCOUNTER — LAB (OUTPATIENT)
Dept: LAB | Facility: HOSPITAL | Age: 84
End: 2024-02-01
Payer: MEDICARE

## 2024-02-01 VITALS
DIASTOLIC BLOOD PRESSURE: 70 MMHG | WEIGHT: 248.24 LBS | HEART RATE: 71 BPM | SYSTOLIC BLOOD PRESSURE: 122 MMHG | BODY MASS INDEX: 33.66 KG/M2 | OXYGEN SATURATION: 97 %

## 2024-02-01 DIAGNOSIS — I48.91 ATRIAL FIBRILLATION, UNSPECIFIED TYPE: ICD-10-CM

## 2024-02-01 DIAGNOSIS — I25.10 DISEASE OF CARDIOVASCULAR SYSTEM: ICD-10-CM

## 2024-02-01 DIAGNOSIS — N18.32 CHRONIC KIDNEY DISEASE (CKD) STAGE G3B/A1, MODERATELY DECREASED GLOMERULAR FILTRATION RATE (GFR) BETWEEN 30-44 ML/MIN/1.73 SQUARE METER AND ALBUMINURIA CREATININE RATIO LESS THAN 30 MG/G (CMS/H*: ICD-10-CM

## 2024-02-01 DIAGNOSIS — I10 HYPERTENSION, ESSENTIAL: ICD-10-CM

## 2024-02-01 DIAGNOSIS — E55.9 VITAMIN D DEFICIENCY: ICD-10-CM

## 2024-02-01 DIAGNOSIS — K21.9 CARDIOCHALASIA: ICD-10-CM

## 2024-02-01 DIAGNOSIS — E03.9 HYPOTHYROIDISM, UNSPECIFIED TYPE: ICD-10-CM

## 2024-02-01 DIAGNOSIS — Z95.5 HISTORY OF HEART ARTERY STENT: Primary | ICD-10-CM

## 2024-02-01 LAB
ALBUMIN SERPL-MCNC: 4.1 G/DL (ref 3.5–5.2)
ANION GAP SERPL CALCULATED.3IONS-SCNC: 8.6 MMOL/L (ref 5–15)
BASOPHILS # BLD AUTO: 0.05 10*3/MM3 (ref 0–0.2)
BASOPHILS NFR BLD AUTO: 0.8 % (ref 0–1.5)
BILIRUB UR QL STRIP: NEGATIVE
BUN SERPL-MCNC: 28 MG/DL (ref 8–23)
BUN/CREAT SERPL: 16 (ref 7–25)
CALCIUM SPEC-SCNC: 9.1 MG/DL (ref 8.6–10.5)
CHLORIDE SERPL-SCNC: 102 MMOL/L (ref 98–107)
CLARITY UR: CLEAR
CO2 SERPL-SCNC: 25.4 MMOL/L (ref 22–29)
COLOR UR: YELLOW
CREAT SERPL-MCNC: 1.75 MG/DL (ref 0.76–1.27)
CREAT UR-MCNC: 24.4 MG/DL
DEPRECATED RDW RBC AUTO: 42.4 FL (ref 37–54)
EGFRCR SERPLBLD CKD-EPI 2021: 38.2 ML/MIN/1.73
EOSINOPHIL # BLD AUTO: 0.36 10*3/MM3 (ref 0–0.4)
EOSINOPHIL NFR BLD AUTO: 6.1 % (ref 0.3–6.2)
ERYTHROCYTE [DISTWIDTH] IN BLOOD BY AUTOMATED COUNT: 12.7 % (ref 12.3–15.4)
GLUCOSE SERPL-MCNC: 115 MG/DL (ref 65–99)
GLUCOSE UR STRIP-MCNC: NEGATIVE MG/DL
HCT VFR BLD AUTO: 40.1 % (ref 37.5–51)
HGB BLD-MCNC: 13.6 G/DL (ref 13–17.7)
HGB UR QL STRIP.AUTO: NEGATIVE
IMM GRANULOCYTES # BLD AUTO: 0.03 10*3/MM3 (ref 0–0.05)
IMM GRANULOCYTES NFR BLD AUTO: 0.5 % (ref 0–0.5)
KETONES UR QL STRIP: NEGATIVE
LEUKOCYTE ESTERASE UR QL STRIP.AUTO: NEGATIVE
LYMPHOCYTES # BLD AUTO: 1.57 10*3/MM3 (ref 0.7–3.1)
LYMPHOCYTES NFR BLD AUTO: 26.5 % (ref 19.6–45.3)
MCH RBC QN AUTO: 31.1 PG (ref 26.6–33)
MCHC RBC AUTO-ENTMCNC: 33.9 G/DL (ref 31.5–35.7)
MCV RBC AUTO: 91.6 FL (ref 79–97)
MONOCYTES # BLD AUTO: 0.67 10*3/MM3 (ref 0.1–0.9)
MONOCYTES NFR BLD AUTO: 11.3 % (ref 5–12)
NEUTROPHILS NFR BLD AUTO: 3.24 10*3/MM3 (ref 1.7–7)
NEUTROPHILS NFR BLD AUTO: 54.8 % (ref 42.7–76)
NITRITE UR QL STRIP: NEGATIVE
NRBC BLD AUTO-RTO: 0 /100 WBC (ref 0–0.2)
PH UR STRIP.AUTO: 6 [PH] (ref 5–8)
PHOSPHATE SERPL-MCNC: 3.7 MG/DL (ref 2.5–4.5)
PLATELET # BLD AUTO: 157 10*3/MM3 (ref 140–450)
PMV BLD AUTO: 10.7 FL (ref 6–12)
POTASSIUM SERPL-SCNC: 5.2 MMOL/L (ref 3.5–5.2)
PROT ?TM UR-MCNC: 5.8 MG/DL
PROT UR QL STRIP: NEGATIVE
PROT/CREAT UR: 0.24 MG/G{CREAT}
RBC # BLD AUTO: 4.38 10*6/MM3 (ref 4.14–5.8)
SODIUM SERPL-SCNC: 136 MMOL/L (ref 136–145)
SP GR UR STRIP: 1.01 (ref 1–1.03)
UROBILINOGEN UR QL STRIP: NORMAL
WBC NRBC COR # BLD AUTO: 5.92 10*3/MM3 (ref 3.4–10.8)

## 2024-02-01 PROCEDURE — 36415 COLL VENOUS BLD VENIPUNCTURE: CPT

## 2024-02-01 PROCEDURE — 81003 URINALYSIS AUTO W/O SCOPE: CPT

## 2024-02-01 PROCEDURE — 82570 ASSAY OF URINE CREATININE: CPT

## 2024-02-01 PROCEDURE — 85025 COMPLETE CBC W/AUTO DIFF WBC: CPT

## 2024-02-01 PROCEDURE — 84156 ASSAY OF PROTEIN URINE: CPT

## 2024-02-01 PROCEDURE — 80069 RENAL FUNCTION PANEL: CPT

## 2024-02-08 ENCOUNTER — OFFICE VISIT (OUTPATIENT)
Dept: CARDIAC REHAB | Facility: HOSPITAL | Age: 84
End: 2024-02-08

## 2024-02-08 VITALS
DIASTOLIC BLOOD PRESSURE: 80 MMHG | HEART RATE: 67 BPM | OXYGEN SATURATION: 96 % | SYSTOLIC BLOOD PRESSURE: 118 MMHG | WEIGHT: 244.93 LBS | BODY MASS INDEX: 33.21 KG/M2

## 2024-02-08 DIAGNOSIS — Z95.5 HISTORY OF HEART ARTERY STENT: Primary | ICD-10-CM

## 2024-02-13 ENCOUNTER — APPOINTMENT (OUTPATIENT)
Dept: CARDIAC REHAB | Facility: HOSPITAL | Age: 84
End: 2024-02-13

## 2024-02-15 ENCOUNTER — APPOINTMENT (OUTPATIENT)
Dept: CARDIAC REHAB | Facility: HOSPITAL | Age: 84
End: 2024-02-15

## 2024-02-20 ENCOUNTER — APPOINTMENT (OUTPATIENT)
Dept: CARDIAC REHAB | Facility: HOSPITAL | Age: 84
End: 2024-02-20

## 2024-02-22 ENCOUNTER — OFFICE VISIT (OUTPATIENT)
Dept: CARDIAC REHAB | Facility: HOSPITAL | Age: 84
End: 2024-02-22

## 2024-02-22 DIAGNOSIS — Z95.5 HISTORY OF HEART ARTERY STENT: Primary | ICD-10-CM

## 2024-02-26 ENCOUNTER — OFFICE VISIT (OUTPATIENT)
Dept: CARDIOLOGY | Facility: CLINIC | Age: 84
End: 2024-02-26
Payer: MEDICARE

## 2024-02-26 VITALS
HEIGHT: 72 IN | SYSTOLIC BLOOD PRESSURE: 131 MMHG | BODY MASS INDEX: 33.46 KG/M2 | DIASTOLIC BLOOD PRESSURE: 69 MMHG | HEART RATE: 64 BPM | WEIGHT: 247 LBS

## 2024-02-26 DIAGNOSIS — I25.10 CORONARY ARTERY DISEASE INVOLVING NATIVE CORONARY ARTERY OF NATIVE HEART WITHOUT ANGINA PECTORIS: Primary | ICD-10-CM

## 2024-02-26 DIAGNOSIS — E78.2 MIXED HYPERLIPIDEMIA: ICD-10-CM

## 2024-02-26 DIAGNOSIS — I48.0 PAROXYSMAL ATRIAL FIBRILLATION: ICD-10-CM

## 2024-02-26 DIAGNOSIS — I10 HYPERTENSION, ESSENTIAL: ICD-10-CM

## 2024-02-26 PROCEDURE — 3075F SYST BP GE 130 - 139MM HG: CPT | Performed by: FAMILY MEDICINE

## 2024-02-26 PROCEDURE — 1159F MED LIST DOCD IN RCRD: CPT | Performed by: FAMILY MEDICINE

## 2024-02-26 PROCEDURE — 3078F DIAST BP <80 MM HG: CPT | Performed by: FAMILY MEDICINE

## 2024-02-26 PROCEDURE — 1160F RVW MEDS BY RX/DR IN RCRD: CPT | Performed by: FAMILY MEDICINE

## 2024-02-26 PROCEDURE — 93000 ELECTROCARDIOGRAM COMPLETE: CPT | Performed by: FAMILY MEDICINE

## 2024-02-26 PROCEDURE — 99214 OFFICE O/P EST MOD 30 MIN: CPT | Performed by: FAMILY MEDICINE

## 2024-02-26 NOTE — ASSESSMENT & PLAN NOTE
He is stable without symptoms of angina.  Continue daily aspirin, beta-blocker and statin therapy.

## 2024-02-26 NOTE — ASSESSMENT & PLAN NOTE
He remains in sinus rhythm without any documented episodes of atrial fibrillation for several years.  Continue sotalol for rate and rhythm control.  EKG completed in the office today shows acceptable corrected QTc interval.  He is no longer on anticoagulation after completing 30-day Holter several years prior without any recurrence of atrial fibrillation.

## 2024-02-26 NOTE — ASSESSMENT & PLAN NOTE
Blood pressure is well-controlled, continue current regiment with amlodipine, irbesartan, spironolactone, sotalol, and furosemide.  He has chronic kidney disease, his most recent creatinine is 1.75, he continues to follow with nephrology.

## 2024-02-26 NOTE — PROGRESS NOTES
Chief Complaint  Follow-up, Coronary Artery Disease, Hypertension, and Hyperlipidemia    Subjective        History of Present Illness  Michael Sol presents to [facility] CARDIOLOG[facility]   Mr. Sol is an 83-year-old male patient coming in for routine 9-month cardiac follow-up for coronary disease and paroxysmal atrial fibrillation.  He reports he is doing well, denies any concerns at visit today.  Specifically he does not have any complaints of chest pains, shortness of breath, palpitations, lightheadedness or dizziness.       Past History:  Coronary artery disease : Status post angioplasty to LAD artery in 2007.  Paroxysmal atrial fibrillation : On sotalol for many years, currently not on anticoagulation  Essential hypertension  Mixed hyperlipidemia  Chronic kidney disease        Past Medical History:   Diagnosis Date    Atrial fibrillation     Benign prostatic hyperplasia without lower urinary tract symptoms     Chronic kidney disease, stage 3     Essential (primary) hypertension     Hyperlipidemia, unspecified     Hypothyroidism, unspecified     Myocardial infarction     Vitamin D deficiency, unspecified        Allergies   Allergen Reactions    Lisinopril Cough        Past Surgical History:   Procedure Laterality Date    CARDIAC CATHETERIZATION      CORONARY STENT PLACEMENT          Social History  He  reports that he quit smoking about 53 years ago. His smoking use included cigarettes. He has a 20.00 pack-year smoking history. He has never used smokeless tobacco. He reports current alcohol use. He reports that he does not use drugs.    Family History  His family history is not on file.       Current Outpatient Medications on File Prior to Visit   Medication Sig    amitriptyline (ELAVIL) 75 MG tablet Take 0.5 tablets by mouth Every Night.    amLODIPine (NORVASC) 10 MG tablet Take 1 tablet by mouth Daily.    aspirin 81 MG EC tablet Take 1 tablet by mouth Daily.    atorvastatin (LIPITOR) 80  "MG tablet Take 1 tablet by mouth Daily.    cholecalciferol (VITAMIN D3) 25 MCG (1000 UT) tablet Take 1 tablet by mouth Daily.    cyanocobalamin 1000 MCG/ML injection Inject 1 mL into the appropriate muscle as directed by prescriber Every 28 (Twenty-Eight) Days.    ezetimibe (ZETIA) 10 MG tablet Take 1 tablet by mouth Daily.    furosemide (LASIX) 20 MG tablet Take 1 tablet by mouth Daily. 1 tab six days a week.    irbesartan (AVAPRO) 300 MG tablet TAKE 1 TABLET BY MOUTH EVERY NIGHT    levothyroxine (SYNTHROID, LEVOTHROID) 50 MCG tablet Take 1 tablet by mouth Daily.    pantoprazole (Protonix) 40 MG EC tablet Take 1 tablet by mouth Daily.    sotalol (BETAPACE) 80 MG tablet Take 0.5 tablets by mouth 2 (Two) Times a Day.    spironolactone (ALDACTONE) 25 MG tablet Take 1 tablet by mouth.     Current Facility-Administered Medications on File Prior to Visit   Medication    cyanocobalamin injection 1,000 mcg         Review of Systems   Respiratory:  Negative for cough, shortness of breath and wheezing.    Cardiovascular:  Negative for chest pain, palpitations and leg swelling.   Gastrointestinal:  Negative for nausea and vomiting.   Neurological:  Negative for dizziness and syncope.           Objective   Vitals:    02/26/24 1325   BP: 131/69   Pulse: 64   Weight: 112 kg (247 lb)   Height: 182.9 cm (72.01\")         Physical Exam  General : Alert, awake, no acute distress  Neck : Supple, no carotid bruit, no jugular venous distention  CVS : Regular rate and rhythm, no murmur, rubs or gallops  Lungs: Clear to auscultation bilaterally, no crackles or rhonchi  Abdomen: Soft, nontender, bowel sounds active  Extremities: Warm, well-perfused, no pedal edema      Result Review     The following data was reviewed by BILL Shepard  proBNP   Date Value Ref Range Status   09/14/2021 30.7 0.0-1,800.0 pg/mL Final     CMP          6/21/2023    07:03 8/11/2023    06:45 2/1/2024    07:18   CMP   Glucose 120  118  115    BUN 20  21  28  " "  Creatinine 1.69  1.42  1.75    EGFR 39.8  49.0  38.2    Sodium 139  139  136    Potassium 4.9  4.7  5.2    Chloride 104  104  102    Calcium 9.5  9.4  9.1    Total Protein 6.0  6.0     Albumin 4.2  4.1  4.1    Globulin 1.8  1.9     Total Bilirubin 0.3  0.4     Alkaline Phosphatase 64  65     AST (SGOT) 16  17     ALT (SGPT) 16  15     Albumin/Globulin Ratio 2.3  2.2     BUN/Creatinine Ratio 11.8  14.8  16.0    Anion Gap 7.0  11.9  8.6      CBC w/diff          8/11/2023    06:45 2/1/2024    07:18   CBC w/Diff   WBC 4.91  5.92    RBC 4.56  4.38    Hemoglobin 14.4  13.6    Hematocrit 42.7  40.1    MCV 93.6  91.6    MCH 31.6  31.1    MCHC 33.7  33.9    RDW 13.6  12.7    Platelets 146  157    Neutrophil Rel % 55.8  54.8    Immature Granulocyte Rel % 0.2  0.5    Lymphocyte Rel % 26.7  26.5    Monocyte Rel % 10.6  11.3    Eosinophil Rel % 5.9  6.1    Basophil Rel % 0.8  0.8       Lab Results   Component Value Date    TSH 3.140 02/07/2023      Lab Results   Component Value Date    FREET4 1.26 02/07/2023      No results found for: \"DDIMERQUANT\"  Magnesium   Date Value Ref Range Status   06/13/2022 2.0 1.6 - 2.4 mg/dL Final        Lipid Panel          6/21/2023    07:03   Lipid Panel   Total Cholesterol 124    Triglycerides 311    HDL Cholesterol 30    VLDL Cholesterol 48    LDL Cholesterol  46    LDL/HDL Ratio 1.06        ECG 12 Lead    Date/Time: 2/26/2024 4:02 PM  Performed by: Cherri Tripathi APRN    Authorized by: Cherri Tripathi APRN  Comparison: compared with previous ECG from 6/26/2023  Similar to previous ECG  Rhythm: sinus bradycardia  Rate: bradycardic  Conduction: incomplete right bundle branch block and left anterior fascicular block  QRS axis: left    Clinical impression: abnormal EKG  Comments: Acceptable QTc interval                Assessment and Plan   Diagnoses and all orders for this visit:    1. Coronary artery disease involving native coronary artery of native heart without angina pectoris " (Primary)  Assessment & Plan:  He is stable without symptoms of angina.  Continue daily aspirin, beta-blocker and statin therapy.      2. Paroxysmal atrial fibrillation  Assessment & Plan:  He remains in sinus rhythm without any documented episodes of atrial fibrillation for several years.  Continue sotalol for rate and rhythm control.  EKG completed in the office today shows acceptable corrected QTc interval.  He is no longer on anticoagulation after completing 30-day Holter several years prior without any recurrence of atrial fibrillation.    Orders:  -     CBC (No Diff); Future  -     Comprehensive Metabolic Panel; Future  -     ECG 12 Lead    3. Hypertension, essential  Assessment & Plan:  Blood pressure is well-controlled, continue current regiment with amlodipine, irbesartan, spironolactone, sotalol, and furosemide.  He has chronic kidney disease, his most recent creatinine is 1.75, he continues to follow with nephrology.    Orders:  -     Comprehensive Metabolic Panel; Future    4. Mixed hyperlipidemia  -     Comprehensive Metabolic Panel; Future  -     Lipid Panel; Future            Follow Up   Return in about 9 months (around 11/26/2024) for with Dr. Calero.    Patient was given instructions and counseling regarding his condition or for health maintenance advice. Please see specific information pulled into the AVS if appropriate.     Signed,  Cherri Tripathi, APRN  02/26/2024     Dictated Utilizing Dragon Dictation: Please note that portions of this note were completed with a voice recognition program.  Part of this note may be an electronic transcription/translation of spoken language to printed text using the Dragon Dictation System.

## 2024-02-27 ENCOUNTER — OFFICE VISIT (OUTPATIENT)
Dept: CARDIAC REHAB | Facility: HOSPITAL | Age: 84
End: 2024-02-27

## 2024-02-27 VITALS
BODY MASS INDEX: 33.09 KG/M2 | DIASTOLIC BLOOD PRESSURE: 60 MMHG | SYSTOLIC BLOOD PRESSURE: 110 MMHG | OXYGEN SATURATION: 96 % | WEIGHT: 244.05 LBS | HEART RATE: 67 BPM

## 2024-02-27 DIAGNOSIS — Z95.5 HISTORY OF HEART ARTERY STENT: Primary | ICD-10-CM

## 2024-02-27 NOTE — PROGRESS NOTES
Phase II and III Education    Discipline Teaching:  Cardiac Rehab Phase II []      Cardiac Rehab Phase III []      Pulmonary Rehab II []      Pulmonary Rehab III []      Peripheral Artery Disease []        Class Given:  Asthma Management []      Beginners Cardiac Rehab []      Beginners Pulmonary Rehab []      CAD I []      CAD II []      CHF []      Diabetes Mellitus []     Diet & Cholesterol []     Diet Consult []      Energy Conservation []     Exercise []     Grocery Store Tour []     Harmonica Therapy []     High Blood Pressure []     Living with COPD []     Medication Safety []     Peripheral Artery Disease []     S & S of Infection []      Stress []        Education Topics:  Angina []      Arrhythmias []      Asthma Management []      Beginning Cardiac Rehab []      Blood Pressure []     Blood Sugar []     Breathing Retrainment []     CHF []     Cooking []     Daily Weight []     Diabetes Mellitus []      Diet []     Energy Conservation []     Exercise []      Foot Care []      Grocery Store Tour []      Heart Disease []      Heart Function []      Incision Care []     Living with COPD []     Medication Safety []     PAD Symptoms/Treatment []     Reconditioning Exercises []     S & S Infection []     Smoking Consult []     Stress Management []     Test Results []       Teaching Aids:  Video []      PAD Handout []      Pulmonary Workbook []      CAD Teaching Packet []      Stress Teaching Packet []     Exercise Teaching Packet []      Diet Teaching Packet []      CHF Teaching Packet []      Blood Pressure Teaching Packet []      Smoking Cessation Packet []      Medication Safety Handout []      Pflex Training []      Diabetes Teaching Packet []      Harmonica Therapy Packet []        Teaching Method:  Discussion []      Written Information []      Audio Visual []      Demonstration []        Teaching Recipient:  Patient []      Family []      Friend []      Legal Guardian []      Primary Care Giver []       Significant Other []        Barriers To Learning:  None []      Auditory []      Cultural []      Emotional []      Financial []      Language []    Mental []      Motivation []      Physical []      Reading Skills []      Lutheran []      Verbal/Cognitive []      Visual []      Written/Cognitive []        Teaching Response:  Verified Via Teach back []      Return Demo Via Teach Back []      Reinforcement Needed []      Unable to Return Demo []      Unable to Comprehend []      Declines Teaching  []        Additional Education Topics:  ***    Follow Up Instruction Comment:  ***

## 2024-02-29 ENCOUNTER — PREP FOR SURGERY (OUTPATIENT)
Dept: OTHER | Facility: HOSPITAL | Age: 84
End: 2024-02-29
Payer: MEDICARE

## 2024-02-29 ENCOUNTER — OFFICE VISIT (OUTPATIENT)
Dept: CARDIAC REHAB | Facility: HOSPITAL | Age: 84
End: 2024-02-29

## 2024-02-29 ENCOUNTER — CLINICAL SUPPORT (OUTPATIENT)
Dept: GASTROENTEROLOGY | Facility: CLINIC | Age: 84
End: 2024-02-29
Payer: MEDICARE

## 2024-02-29 VITALS
SYSTOLIC BLOOD PRESSURE: 110 MMHG | BODY MASS INDEX: 33.06 KG/M2 | WEIGHT: 243.83 LBS | OXYGEN SATURATION: 97 % | RESPIRATION RATE: 18 BRPM | DIASTOLIC BLOOD PRESSURE: 68 MMHG | HEART RATE: 61 BPM

## 2024-02-29 DIAGNOSIS — Z12.11 COLON CANCER SCREENING: ICD-10-CM

## 2024-02-29 DIAGNOSIS — Z95.5 HISTORY OF HEART ARTERY STENT: Primary | ICD-10-CM

## 2024-02-29 DIAGNOSIS — Z86.010 HISTORY OF COLON POLYPS: Primary | ICD-10-CM

## 2024-02-29 PROBLEM — Z86.0100 HISTORY OF COLON POLYPS: Status: ACTIVE | Noted: 2024-02-29

## 2024-02-29 NOTE — PROGRESS NOTES
Michael Jeffries Sol  1940  83 y.o.    Reason for call: Recall- 5 yr recall, hx colon polyps, last colon - KM  Prep prescribed: Suflave/Golytely- due to kidney function  Prep instructions reviewed with patient and sent to patient via regular mail to the home address on file  Is the patient currently on any injectable medications for weight loss or diabetes? No  Clearance needed? Yes  If yes, what clearance is needed? Cardiology  Clearance has been requested from [facility]  The patient has been scheduled for: Colonoscopy  After your procedure, you will be contacted with results. Please confirm the best phone # to reach the patient: [phone]  Family history of colon cancer? No  If yes, indicate relative: N/A  Family History   Problem Relation Age of Onset    Colon cancer Neg Hx      Past Medical History:   Diagnosis Date    Atrial fibrillation     Benign prostatic hyperplasia without lower urinary tract symptoms     Chronic kidney disease, stage 3     Colon polyp     Essential (primary) hypertension     Hyperlipidemia, unspecified     Hypothyroidism, unspecified     Myocardial infarction     Vitamin D deficiency, unspecified      Allergies   Allergen Reactions    Lisinopril Cough     Past Surgical History:   Procedure Laterality Date    CARDIAC CATHETERIZATION      COLONOSCOPY      CORONARY STENT PLACEMENT      PROSTATE SURGERY       Social History     Socioeconomic History    Marital status:    Tobacco Use    Smoking status: Former     Packs/day: 1.00     Years: 20.00     Additional pack years: 0.00     Total pack years: 20.00     Types: Cigarettes     Quit date:      Years since quittin.1     Passive exposure: Past    Smokeless tobacco: Never   Vaping Use    Vaping Use: Never used   Substance and Sexual Activity    Alcohol use: Yes     Comment: occasionally    Drug use: Never    Sexual activity: Defer       Current Outpatient Medications:     amitriptyline (ELAVIL) 75 MG tablet, Take 0.5  tablets by mouth Every Night., Disp: , Rfl:     amLODIPine (NORVASC) 10 MG tablet, Take 1 tablet by mouth Daily., Disp: , Rfl:     aspirin 81 MG EC tablet, Take 1 tablet by mouth Daily., Disp: , Rfl:     atorvastatin (LIPITOR) 80 MG tablet, Take 1 tablet by mouth Daily., Disp: 90 tablet, Rfl: 3    cholecalciferol (VITAMIN D3) 25 MCG (1000 UT) tablet, Take 1 tablet by mouth Daily., Disp: , Rfl:     cyanocobalamin 1000 MCG/ML injection, Inject 1 mL into the appropriate muscle as directed by prescriber Every 28 (Twenty-Eight) Days., Disp: , Rfl:     ezetimibe (ZETIA) 10 MG tablet, Take 1 tablet by mouth Daily., Disp: 90 tablet, Rfl: 3    furosemide (LASIX) 20 MG tablet, Take 1 tablet by mouth Daily. 1 tab six days a week., Disp: 90 tablet, Rfl: 3    irbesartan (AVAPRO) 300 MG tablet, TAKE 1 TABLET BY MOUTH EVERY NIGHT, Disp: 90 tablet, Rfl: 0    levothyroxine (SYNTHROID, LEVOTHROID) 50 MCG tablet, Take 1 tablet by mouth Daily., Disp: , Rfl:     pantoprazole (Protonix) 40 MG EC tablet, Take 1 tablet by mouth Daily., Disp: 60 tablet, Rfl: 3    sotalol (BETAPACE) 80 MG tablet, Take 0.5 tablets by mouth 2 (Two) Times a Day., Disp: , Rfl:     spironolactone (ALDACTONE) 25 MG tablet, Take 1 tablet by mouth., Disp: , Rfl:     Current Facility-Administered Medications:     cyanocobalamin injection 1,000 mcg, 1,000 mcg, Intramuscular, Q28 Days, Pablo Salazar MD, 1,000 mcg at 08/15/22 6391

## 2024-03-05 ENCOUNTER — TELEPHONE (OUTPATIENT)
Dept: GASTROENTEROLOGY | Facility: CLINIC | Age: 84
End: 2024-03-05
Payer: MEDICARE

## 2024-03-05 ENCOUNTER — OFFICE VISIT (OUTPATIENT)
Dept: CARDIAC REHAB | Facility: HOSPITAL | Age: 84
End: 2024-03-05

## 2024-03-05 VITALS
SYSTOLIC BLOOD PRESSURE: 128 MMHG | OXYGEN SATURATION: 96 % | DIASTOLIC BLOOD PRESSURE: 78 MMHG | HEART RATE: 66 BPM | WEIGHT: 245.59 LBS | BODY MASS INDEX: 33.3 KG/M2

## 2024-03-05 DIAGNOSIS — Z95.5 HISTORY OF HEART ARTERY STENT: Primary | ICD-10-CM

## 2024-03-05 NOTE — TELEPHONE ENCOUNTER
3/5/2024    Dear Dr. Calero,      Patient Name: Michael Sol  : 1940      This patient is waiting to have a Colonoscopy and/or Esophagogastroduodenoscopy which I will perform at [facility] on 24. Please respond to this request noting your recommendations regarding clearance from a Cardiac  standpoint.  You may contact our office at [phone] Option 1 with any questions. I appreciate your prompt response in this matter. Please return this form to our office as soon as possible to [phone].    ____ I approve my patient from a Cardiac  standpoint    ____ I do NOT approve my patient from a Cardiac  standpoint at this time      Please specify clearance expiration date:____________________________________      Approving physician name (please print): _____________________________________________      Approving physician signature: ________________________________ Date:________________  Sincerely,  [facility] - Gastroenterology   Dr. Derrick Garcia          Please fax approval or denial to our office as soon as possible.

## 2024-03-05 NOTE — TELEPHONE ENCOUNTER
Procedure: Colonoscopy and/or EGD     Medication Directive: NA    PMH: CAD, PAF, HTN, HLD    Last Seen:  02/26/24

## 2024-03-06 ENCOUNTER — TELEPHONE (OUTPATIENT)
Dept: INTERNAL MEDICINE | Facility: CLINIC | Age: 84
End: 2024-03-06
Payer: MEDICARE

## 2024-03-06 NOTE — TELEPHONE ENCOUNTER
HUB WAS UNABLE TO WARM TRANSFER     Caller: Michael Sol    Relationship: Self    Best call back number: [phone]     What is the best time to reach you: ANYTIME     Who are you requesting to speak with (clinical staff, provider,  specific staff member): CLINICAL     Do you know the name of the person who called: ANYTIME     What was the call regarding: PATIENT IS CALLING IN REGARDS TO NEXT ESTIVEN COMING UP AND IF HE DOES HAVE ACTIVE LABS TO FOLLOW THE APPOINTMENT.

## 2024-03-07 ENCOUNTER — OFFICE VISIT (OUTPATIENT)
Dept: CARDIAC REHAB | Facility: HOSPITAL | Age: 84
End: 2024-03-07

## 2024-03-07 VITALS
HEART RATE: 66 BPM | DIASTOLIC BLOOD PRESSURE: 70 MMHG | WEIGHT: 242.95 LBS | BODY MASS INDEX: 32.94 KG/M2 | SYSTOLIC BLOOD PRESSURE: 114 MMHG

## 2024-03-07 DIAGNOSIS — Z95.5 HISTORY OF HEART ARTERY STENT: Primary | ICD-10-CM

## 2024-03-07 NOTE — TELEPHONE ENCOUNTER
Patient may proceed with upcoming colonoscopy and/or EGD at moderate risk for perioperative cardiac events.

## 2024-03-12 ENCOUNTER — OFFICE VISIT (OUTPATIENT)
Dept: CARDIAC REHAB | Facility: HOSPITAL | Age: 84
End: 2024-03-12

## 2024-03-12 VITALS
HEART RATE: 77 BPM | BODY MASS INDEX: 33.09 KG/M2 | WEIGHT: 244.05 LBS | DIASTOLIC BLOOD PRESSURE: 80 MMHG | SYSTOLIC BLOOD PRESSURE: 138 MMHG | OXYGEN SATURATION: 98 %

## 2024-03-12 DIAGNOSIS — Z95.5 HISTORY OF HEART ARTERY STENT: Primary | ICD-10-CM

## 2024-03-14 ENCOUNTER — OFFICE VISIT (OUTPATIENT)
Dept: CARDIAC REHAB | Facility: HOSPITAL | Age: 84
End: 2024-03-14

## 2024-03-14 VITALS
WEIGHT: 244.93 LBS | DIASTOLIC BLOOD PRESSURE: 90 MMHG | BODY MASS INDEX: 33.21 KG/M2 | HEART RATE: 68 BPM | OXYGEN SATURATION: 96 % | SYSTOLIC BLOOD PRESSURE: 134 MMHG

## 2024-03-14 DIAGNOSIS — Z95.5 HISTORY OF HEART ARTERY STENT: Primary | ICD-10-CM

## 2024-03-19 ENCOUNTER — OFFICE VISIT (OUTPATIENT)
Dept: CARDIAC REHAB | Facility: HOSPITAL | Age: 84
End: 2024-03-19

## 2024-03-19 VITALS
WEIGHT: 244.05 LBS | HEART RATE: 73 BPM | BODY MASS INDEX: 33.09 KG/M2 | OXYGEN SATURATION: 97 % | SYSTOLIC BLOOD PRESSURE: 138 MMHG | DIASTOLIC BLOOD PRESSURE: 80 MMHG

## 2024-03-19 DIAGNOSIS — Z95.5 HISTORY OF HEART ARTERY STENT: Primary | ICD-10-CM

## 2024-03-20 ENCOUNTER — LAB (OUTPATIENT)
Dept: LAB | Facility: HOSPITAL | Age: 84
End: 2024-03-20
Payer: MEDICARE

## 2024-03-20 ENCOUNTER — APPOINTMENT (OUTPATIENT)
Dept: LAB | Facility: HOSPITAL | Age: 84
End: 2024-03-20
Payer: MEDICARE

## 2024-03-20 DIAGNOSIS — E78.2 MIXED HYPERLIPIDEMIA: ICD-10-CM

## 2024-03-20 DIAGNOSIS — I10 HYPERTENSION, ESSENTIAL: ICD-10-CM

## 2024-03-20 DIAGNOSIS — R73.01 IFG (IMPAIRED FASTING GLUCOSE): ICD-10-CM

## 2024-03-20 DIAGNOSIS — E03.9 HYPOTHYROIDISM, UNSPECIFIED TYPE: ICD-10-CM

## 2024-03-20 DIAGNOSIS — E53.8 VITAMIN B 12 DEFICIENCY: ICD-10-CM

## 2024-03-20 DIAGNOSIS — I48.0 PAROXYSMAL ATRIAL FIBRILLATION: ICD-10-CM

## 2024-03-20 DIAGNOSIS — N18.32 STAGE 3B CHRONIC KIDNEY DISEASE: ICD-10-CM

## 2024-03-20 DIAGNOSIS — I25.10 CORONARY ARTERY DISEASE INVOLVING NATIVE CORONARY ARTERY OF NATIVE HEART WITHOUT ANGINA PECTORIS: ICD-10-CM

## 2024-03-20 DIAGNOSIS — Z00.00 MEDICARE ANNUAL WELLNESS VISIT, SUBSEQUENT: ICD-10-CM

## 2024-03-20 DIAGNOSIS — Z12.5 SCREENING PSA (PROSTATE SPECIFIC ANTIGEN): ICD-10-CM

## 2024-03-20 LAB
ALBUMIN SERPL-MCNC: 4.4 G/DL (ref 3.5–5.2)
ALBUMIN/GLOB SERPL: 2.1 G/DL
ALP SERPL-CCNC: 65 U/L (ref 39–117)
ALT SERPL W P-5'-P-CCNC: 17 U/L (ref 1–41)
ANION GAP SERPL CALCULATED.3IONS-SCNC: 10 MMOL/L (ref 5–15)
AST SERPL-CCNC: 16 U/L (ref 1–40)
BASOPHILS # BLD AUTO: 0.05 10*3/MM3 (ref 0–0.2)
BASOPHILS NFR BLD AUTO: 0.8 % (ref 0–1.5)
BILIRUB SERPL-MCNC: 0.5 MG/DL (ref 0–1.2)
BUN SERPL-MCNC: 33 MG/DL (ref 8–23)
BUN/CREAT SERPL: 17.7 (ref 7–25)
CALCIUM SPEC-SCNC: 9.5 MG/DL (ref 8.6–10.5)
CHLORIDE SERPL-SCNC: 101 MMOL/L (ref 98–107)
CHOLEST SERPL-MCNC: 134 MG/DL (ref 0–200)
CO2 SERPL-SCNC: 25 MMOL/L (ref 22–29)
CREAT SERPL-MCNC: 1.86 MG/DL (ref 0.76–1.27)
DEPRECATED RDW RBC AUTO: 43.2 FL (ref 37–54)
EGFRCR SERPLBLD CKD-EPI 2021: 35.5 ML/MIN/1.73
EOSINOPHIL # BLD AUTO: 0.31 10*3/MM3 (ref 0–0.4)
EOSINOPHIL NFR BLD AUTO: 5.1 % (ref 0.3–6.2)
ERYTHROCYTE [DISTWIDTH] IN BLOOD BY AUTOMATED COUNT: 12.9 % (ref 12.3–15.4)
GLOBULIN UR ELPH-MCNC: 2.1 GM/DL
GLUCOSE SERPL-MCNC: 112 MG/DL (ref 65–99)
HBA1C MFR BLD: 6.1 % (ref 4.8–5.6)
HCT VFR BLD AUTO: 40 % (ref 37.5–51)
HDLC SERPL-MCNC: 38 MG/DL (ref 40–60)
HGB BLD-MCNC: 13.7 G/DL (ref 13–17.7)
IMM GRANULOCYTES # BLD AUTO: 0.02 10*3/MM3 (ref 0–0.05)
IMM GRANULOCYTES NFR BLD AUTO: 0.3 % (ref 0–0.5)
LDLC SERPL CALC-MCNC: 46 MG/DL (ref 0–100)
LDLC/HDLC SERPL: 0.79 {RATIO}
LYMPHOCYTES # BLD AUTO: 1.66 10*3/MM3 (ref 0.7–3.1)
LYMPHOCYTES NFR BLD AUTO: 27.5 % (ref 19.6–45.3)
MCH RBC QN AUTO: 31.4 PG (ref 26.6–33)
MCHC RBC AUTO-ENTMCNC: 34.3 G/DL (ref 31.5–35.7)
MCV RBC AUTO: 91.7 FL (ref 79–97)
MONOCYTES # BLD AUTO: 0.6 10*3/MM3 (ref 0.1–0.9)
MONOCYTES NFR BLD AUTO: 10 % (ref 5–12)
NEUTROPHILS NFR BLD AUTO: 3.39 10*3/MM3 (ref 1.7–7)
NEUTROPHILS NFR BLD AUTO: 56.3 % (ref 42.7–76)
NRBC BLD AUTO-RTO: 0 /100 WBC (ref 0–0.2)
PLATELET # BLD AUTO: 138 10*3/MM3 (ref 140–450)
PMV BLD AUTO: 10.7 FL (ref 6–12)
POTASSIUM SERPL-SCNC: 4.6 MMOL/L (ref 3.5–5.2)
PROT SERPL-MCNC: 6.5 G/DL (ref 6–8.5)
PSA SERPL-MCNC: 0.1 NG/ML (ref 0–4)
RBC # BLD AUTO: 4.36 10*6/MM3 (ref 4.14–5.8)
SODIUM SERPL-SCNC: 136 MMOL/L (ref 136–145)
T4 FREE SERPL-MCNC: 1.32 NG/DL (ref 0.93–1.7)
TRIGL SERPL-MCNC: 329 MG/DL (ref 0–150)
TSH SERPL DL<=0.05 MIU/L-ACNC: 2.11 UIU/ML (ref 0.27–4.2)
VLDLC SERPL-MCNC: 50 MG/DL (ref 5–40)
WBC NRBC COR # BLD AUTO: 6.03 10*3/MM3 (ref 3.4–10.8)

## 2024-03-20 PROCEDURE — 84439 ASSAY OF FREE THYROXINE: CPT

## 2024-03-20 PROCEDURE — 84443 ASSAY THYROID STIM HORMONE: CPT

## 2024-03-20 PROCEDURE — 36415 COLL VENOUS BLD VENIPUNCTURE: CPT

## 2024-03-20 PROCEDURE — 83036 HEMOGLOBIN GLYCOSYLATED A1C: CPT

## 2024-03-20 PROCEDURE — 85025 COMPLETE CBC W/AUTO DIFF WBC: CPT

## 2024-03-20 PROCEDURE — 80061 LIPID PANEL: CPT

## 2024-03-20 PROCEDURE — G0103 PSA SCREENING: HCPCS

## 2024-03-20 PROCEDURE — 80053 COMPREHEN METABOLIC PANEL: CPT

## 2024-03-25 ENCOUNTER — OFFICE VISIT (OUTPATIENT)
Dept: INTERNAL MEDICINE | Age: 84
End: 2024-03-25
Payer: MEDICARE

## 2024-03-25 VITALS
OXYGEN SATURATION: 94 % | TEMPERATURE: 98.2 F | WEIGHT: 244 LBS | HEIGHT: 72 IN | SYSTOLIC BLOOD PRESSURE: 122 MMHG | DIASTOLIC BLOOD PRESSURE: 71 MMHG | HEART RATE: 63 BPM | BODY MASS INDEX: 33.05 KG/M2

## 2024-03-25 DIAGNOSIS — E78.2 MIXED HYPERLIPIDEMIA: ICD-10-CM

## 2024-03-25 DIAGNOSIS — R73.01 IFG (IMPAIRED FASTING GLUCOSE): ICD-10-CM

## 2024-03-25 DIAGNOSIS — I67.1 BRAIN ANEURYSM: ICD-10-CM

## 2024-03-25 DIAGNOSIS — E55.9 VITAMIN D DEFICIENCY: ICD-10-CM

## 2024-03-25 DIAGNOSIS — E03.9 HYPOTHYROIDISM, UNSPECIFIED TYPE: ICD-10-CM

## 2024-03-25 DIAGNOSIS — I10 HYPERTENSION, ESSENTIAL: ICD-10-CM

## 2024-03-25 DIAGNOSIS — E53.8 VITAMIN B 12 DEFICIENCY: ICD-10-CM

## 2024-03-25 DIAGNOSIS — Z85.46 HISTORY OF PROSTATE CANCER: Primary | ICD-10-CM

## 2024-03-25 DIAGNOSIS — I48.0 PAROXYSMAL ATRIAL FIBRILLATION: ICD-10-CM

## 2024-03-25 DIAGNOSIS — I25.10 CORONARY ARTERY DISEASE INVOLVING NATIVE CORONARY ARTERY OF NATIVE HEART WITHOUT ANGINA PECTORIS: ICD-10-CM

## 2024-03-25 DIAGNOSIS — N18.32 STAGE 3B CHRONIC KIDNEY DISEASE: ICD-10-CM

## 2024-03-25 RX ORDER — ICOSAPENT ETHYL 1000 MG/1
2 CAPSULE ORAL DAILY
Qty: 180 CAPSULE | Refills: 3 | Status: SHIPPED | OUTPATIENT
Start: 2024-03-25 | End: 2024-03-25

## 2024-03-25 NOTE — PROGRESS NOTES
"CHIEF COMPLAINT/ HPI: Patient is here to follow-up with multiple medical issues doing routine follow-up he says he is doing well otherwise he still working fairly regularly, no chest pains, no recent falls, here to review labs,  Hypertension (Routine follow up, Lab follow up. No concerns today.)              Objective   Vital Signs  Vitals:    03/25/24 0949   BP: 122/71   Pulse: 63   Temp: 98.2 °F (36.8 °C)   SpO2: 94%   Weight: 111 kg (244 lb)   Height: 182.9 cm (72.01\")      Body mass index is 33.08 kg/m².  Review of Systems   Constitutional: Negative.    HENT: Negative.     Eyes: Negative.    Respiratory: Negative.     Cardiovascular: Negative.    Gastrointestinal: Negative.    Endocrine: Negative.    Genitourinary: Negative.    Musculoskeletal: Negative.    Allergic/Immunologic: Negative.    Neurological: Negative.    Hematological: Negative.    Psychiatric/Behavioral: Negative.        Physical Exam  Constitutional:       General: He is not in acute distress.     Appearance: Normal appearance.   HENT:      Head: Normocephalic.      Mouth/Throat:      Mouth: Mucous membranes are moist.   Eyes:      Conjunctiva/sclera: Conjunctivae normal.      Pupils: Pupils are equal, round, and reactive to light.   Cardiovascular:      Rate and Rhythm: Normal rate and regular rhythm.      Pulses: Normal pulses.      Heart sounds: Normal heart sounds.   Pulmonary:      Effort: Pulmonary effort is normal.      Breath sounds: Normal breath sounds.   Abdominal:      General: Bowel sounds are normal.      Palpations: Abdomen is soft.   Musculoskeletal:         General: No swelling. Normal range of motion.      Cervical back: Neck supple.   Skin:     General: Skin is warm and dry.      Coloration: Skin is not jaundiced.   Neurological:      General: No focal deficit present.      Mental Status: He is alert and oriented to person, place, and time. Mental status is at baseline.   Psychiatric:         Mood and Affect: Mood normal.       "   Behavior: Behavior normal.         Thought Content: Thought content normal.         Judgment: Judgment normal.        Result Review :   Lab Results   Component Value Date    PROBNP 30.7 09/14/2021    PROBNP 17.7 06/23/2021     CMP          8/11/2023    06:45 2/1/2024    07:18 3/20/2024    12:34   CMP   Glucose 118  115  112    BUN 21  28  33    Creatinine 1.42  1.75  1.86    EGFR 49.0  38.2  35.5    Sodium 139  136  136    Potassium 4.7  5.2  4.6    Chloride 104  102  101    Calcium 9.4  9.1  9.5    Total Protein 6.0   6.5    Albumin 4.1  4.1  4.4    Globulin 1.9   2.1    Total Bilirubin 0.4   0.5    Alkaline Phosphatase 65   65    AST (SGOT) 17   16    ALT (SGPT) 15   17    Albumin/Globulin Ratio 2.2   2.1    BUN/Creatinine Ratio 14.8  16.0  17.7    Anion Gap 11.9  8.6  10.0      CBC w/diff          8/11/2023    06:45 2/1/2024    07:18 3/20/2024    12:34   CBC w/Diff   WBC 4.91  5.92  6.03    RBC 4.56  4.38  4.36    Hemoglobin 14.4  13.6  13.7    Hematocrit 42.7  40.1  40.0    MCV 93.6  91.6  91.7    MCH 31.6  31.1  31.4    MCHC 33.7  33.9  34.3    RDW 13.6  12.7  12.9    Platelets 146  157  138    Neutrophil Rel % 55.8  54.8  56.3    Immature Granulocyte Rel % 0.2  0.5  0.3    Lymphocyte Rel % 26.7  26.5  27.5    Monocyte Rel % 10.6  11.3  10.0    Eosinophil Rel % 5.9  6.1  5.1    Basophil Rel % 0.8  0.8  0.8       Lipid Panel          6/21/2023    07:03 3/20/2024    12:34   Lipid Panel   Total Cholesterol 124  134    Triglycerides 311  329    HDL Cholesterol 30  38    VLDL Cholesterol 48  50    LDL Cholesterol  46  46    LDL/HDL Ratio 1.06  0.79       Lab Results   Component Value Date    TSH 2.110 03/20/2024    TSH 3.140 02/07/2023    TSH 3.150 08/15/2022      Lab Results   Component Value Date    FREET4 1.32 03/20/2024    FREET4 1.26 02/07/2023    FREET4 1.36 08/15/2022      A1C Last 3 Results          8/11/2023    06:45 3/20/2024    12:34   HGBA1C Last 3 Results   Hemoglobin A1C 5.60  6.10       PSA           3/20/2024    12:34   PSA   PSA 0.105                     Visit Diagnoses:    ICD-10-CM ICD-9-CM   1. History of prostate cancer  Z85.46 V10.46   2. Brain aneurysm  I67.1 437.3   3. Stage 3b chronic kidney disease  N18.32 585.3   4. Vitamin B 12 deficiency  E53.8 266.2   5. Coronary artery disease involving native coronary artery of native heart without angina pectoris  I25.10 414.01   6. Hypertension, essential  I10 401.9   7. IFG  R73.01 790.21   8. Hypothyroidism, unspecified type  E03.9 244.9   9. Mixed hyperlipidemia  E78.2 272.2   10. Vitamin D deficiency  E55.9 268.9   11. Paroxysmal atrial fibrillation  I48.0 427.31       Assessment and Plan   Diagnoses and all orders for this visit:    1. History of prostate cancer (Primary)  -     CBC & Differential; Future  -     Comprehensive Metabolic Panel; Future  -     Hemoglobin A1c; Future  -     TSH+Free T4; Future  -     Lipid Panel; Future  -     PSA DIAGNOSTIC; Future    2. Brain aneurysm  -     CBC & Differential; Future  -     Comprehensive Metabolic Panel; Future  -     Hemoglobin A1c; Future  -     TSH+Free T4; Future  -     Lipid Panel; Future  -     PSA DIAGNOSTIC; Future    3. Stage 3b chronic kidney disease  -     CBC & Differential; Future  -     Comprehensive Metabolic Panel; Future  -     Hemoglobin A1c; Future  -     TSH+Free T4; Future  -     Lipid Panel; Future  -     PSA DIAGNOSTIC; Future    4. Vitamin B 12 deficiency  -     CBC & Differential; Future  -     Comprehensive Metabolic Panel; Future  -     Hemoglobin A1c; Future  -     TSH+Free T4; Future  -     Lipid Panel; Future  -     PSA DIAGNOSTIC; Future    5. Coronary artery disease involving native coronary artery of native heart without angina pectoris  -     CBC & Differential; Future  -     Comprehensive Metabolic Panel; Future  -     Hemoglobin A1c; Future  -     TSH+Free T4; Future  -     Lipid Panel; Future  -     PSA DIAGNOSTIC; Future    6. Hypertension, essential  -     CBC &  Differential; Future  -     Comprehensive Metabolic Panel; Future  -     Hemoglobin A1c; Future  -     TSH+Free T4; Future  -     Lipid Panel; Future  -     PSA DIAGNOSTIC; Future    7. IFG  -     CBC & Differential; Future  -     Comprehensive Metabolic Panel; Future  -     Hemoglobin A1c; Future  -     TSH+Free T4; Future  -     Lipid Panel; Future  -     PSA DIAGNOSTIC; Future    8. Hypothyroidism, unspecified type  -     CBC & Differential; Future  -     Comprehensive Metabolic Panel; Future  -     Hemoglobin A1c; Future  -     TSH+Free T4; Future  -     Lipid Panel; Future  -     PSA DIAGNOSTIC; Future    9. Mixed hyperlipidemia  -     CBC & Differential; Future  -     Comprehensive Metabolic Panel; Future  -     Hemoglobin A1c; Future  -     TSH+Free T4; Future  -     Lipid Panel; Future  -     PSA DIAGNOSTIC; Future    10. Vitamin D deficiency  -     CBC & Differential; Future  -     Comprehensive Metabolic Panel; Future  -     Hemoglobin A1c; Future  -     TSH+Free T4; Future  -     Lipid Panel; Future  -     PSA DIAGNOSTIC; Future    11. Paroxysmal atrial fibrillation  -     CBC & Differential; Future  -     Comprehensive Metabolic Panel; Future  -     Hemoglobin A1c; Future  -     TSH+Free T4; Future  -     Lipid Panel; Future  -     PSA DIAGNOSTIC; Future    Other orders  -     Discontinue: icosapent ethyl (Vascepa) 1 g capsule capsule; Take 2 g by mouth Daily.  Dispense: 180 capsule; Refill: 3        Right flank pain,abd pain ---- previous CT scan December 15, 2022 showed mild diverticulosis of the descending colon and sigmoid colon no evidence of infection small inguinal hernias containing fat are stable --- chest x-ray, right rib series, showed no active disease November 30, 2022,     Abdominal pains, right flank pain, ---CT scan unremarkable February 2022,, and as above, December 15, 2022    Impaired fasting glucose blood sugar 117 counseled on diet walking low-carb diet March 25, 2024, hemoglobin A1c  6.1 up slightly March 2024, started on Jardiance 10 mg daily by Dr. hernández    CKD stage IIIa, creatinine 1.4-1.8 -seeing dr hernández since August 2022    Coronary artery disease ---previous stent placement 2007, continues aspirin,    Insomnia, continues  Elavil 75 mg tablet ---half tablet at bedtime,    Hypertension continues  Norvasc 10 mg daily, irbesartan 300 mg daily    Lower extremity edema ---continues Lasix 20 mg daily, spironolactone 25 mg daily    Vitamin D and B12 deficiency continues replacement    Hyperlipidemia continues Lipitor 80 mg daily, zetia 10 mg qd , Vascepa 1 g 2 tablets bid    Hypothyroidism continues  levothyroxine 50 mcg daily    Paroxysmal atrial fibrillation, continues  sotalol 40 mg twice a day--dr martinez     Mild thrombocytopenia, chronic,     Prostate Ca==, dr olivarez prostate CA, seed implants, 2012, PSA equals 0.1, March 2024    Overweight, discussed weight loss efforts,      Follow Up   Return in about 6 months (around 9/25/2024).  Patient was given instructions and counseling regarding his condition or for health maintenance advice. Please see specific information pulled into the AVS if appropriate.

## 2024-03-26 ENCOUNTER — OFFICE VISIT (OUTPATIENT)
Dept: CARDIAC REHAB | Facility: HOSPITAL | Age: 84
End: 2024-03-26

## 2024-03-26 VITALS
SYSTOLIC BLOOD PRESSURE: 108 MMHG | BODY MASS INDEX: 32.58 KG/M2 | DIASTOLIC BLOOD PRESSURE: 64 MMHG | OXYGEN SATURATION: 97 % | HEART RATE: 71 BPM | WEIGHT: 240.3 LBS

## 2024-03-26 DIAGNOSIS — Z95.5 HISTORY OF HEART ARTERY STENT: Primary | ICD-10-CM

## 2024-03-28 ENCOUNTER — OFFICE VISIT (OUTPATIENT)
Dept: CARDIAC REHAB | Facility: HOSPITAL | Age: 84
End: 2024-03-28

## 2024-03-28 VITALS
BODY MASS INDEX: 32.88 KG/M2 | DIASTOLIC BLOOD PRESSURE: 76 MMHG | HEART RATE: 75 BPM | SYSTOLIC BLOOD PRESSURE: 138 MMHG | OXYGEN SATURATION: 96 % | WEIGHT: 242.51 LBS

## 2024-03-28 DIAGNOSIS — Z95.5 HISTORY OF HEART ARTERY STENT: Primary | ICD-10-CM

## 2024-04-04 ENCOUNTER — OFFICE VISIT (OUTPATIENT)
Dept: CARDIAC REHAB | Facility: HOSPITAL | Age: 84
End: 2024-04-04

## 2024-04-04 VITALS
OXYGEN SATURATION: 97 % | BODY MASS INDEX: 32.85 KG/M2 | DIASTOLIC BLOOD PRESSURE: 60 MMHG | SYSTOLIC BLOOD PRESSURE: 110 MMHG | WEIGHT: 242.29 LBS | HEART RATE: 62 BPM

## 2024-04-04 DIAGNOSIS — Z95.5 HISTORY OF HEART ARTERY STENT: Primary | ICD-10-CM

## 2024-04-09 ENCOUNTER — OFFICE VISIT (OUTPATIENT)
Dept: CARDIAC REHAB | Facility: HOSPITAL | Age: 84
End: 2024-04-09

## 2024-04-09 VITALS
OXYGEN SATURATION: 97 % | DIASTOLIC BLOOD PRESSURE: 68 MMHG | HEART RATE: 68 BPM | SYSTOLIC BLOOD PRESSURE: 108 MMHG | WEIGHT: 241.4 LBS | BODY MASS INDEX: 32.73 KG/M2

## 2024-04-09 DIAGNOSIS — Z95.5 HISTORY OF HEART ARTERY STENT: Primary | ICD-10-CM

## 2024-04-10 RX ORDER — POLYETHYLENE GLYCOL 3350, SODIUM SULFATE, POTASSIUM CHLORIDE, MAGNESIUM SULFATE, AND SODIUM CHLORIDE FOR ORAL SOLUTION 178.7-7.3G
1 KIT ORAL TAKE AS DIRECTED
Qty: 1 EACH | Refills: 0 | COMMUNITY
Start: 2024-04-10

## 2024-04-11 ENCOUNTER — OFFICE VISIT (OUTPATIENT)
Dept: CARDIAC REHAB | Facility: HOSPITAL | Age: 84
End: 2024-04-11

## 2024-04-11 VITALS
WEIGHT: 243.39 LBS | DIASTOLIC BLOOD PRESSURE: 70 MMHG | BODY MASS INDEX: 33 KG/M2 | HEART RATE: 75 BPM | SYSTOLIC BLOOD PRESSURE: 120 MMHG | OXYGEN SATURATION: 96 %

## 2024-04-11 DIAGNOSIS — Z95.5 HISTORY OF HEART ARTERY STENT: Primary | ICD-10-CM

## 2024-04-15 NOTE — PRE-PROCEDURE INSTRUCTIONS
Left message:     Reminded of arrival time at 0700 , Entrance C of the [facility] hospital. Instructed to bring or have a  over the age of 18 set up to drive you home the day of procedure.    Instructed on clear liquid diet the day before, nothing red or purple. Call with any questions about the prep or if in need of the prep.  Reminded them not to eat or drink anything am of procedure unless its a sip of water with medications.  Hold jardiance, lasix, aldactone am of procedure.

## 2024-04-16 ENCOUNTER — TELEPHONE (OUTPATIENT)
Dept: GASTROENTEROLOGY | Facility: CLINIC | Age: 84
End: 2024-04-16
Payer: MEDICARE

## 2024-04-16 NOTE — TELEPHONE ENCOUNTER
Spoke to pt informed that he was good to hold eliquis for 2 days prior to procedure on 04.22.2024 voiced understanding

## 2024-04-16 NOTE — TELEPHONE ENCOUNTER
"Received message from [facility] KATINA, that pt is taking Eliquis.. he is scheduled for a colonoscopy on 4.22.24, we do not have clearance..    At 02/29/2024 phone screening appt, pt was NOT taking blood thinner..    I spoke with pt, [facility] stated [facility] about \"a month or so ago\"..   I advised pt we have to get clearance from [facility] for him to hold Rx 2 days prior..     Pt stated the phone # [phone], a Dr Ziegler (pt unsure of last name) put him on Rx. I advised pt, if we can not get clearance back in time, we will have to reschedule.  Voiced understanding.  Ty      **[facility] to [facility], marked Urgent**  "

## 2024-04-16 NOTE — TELEPHONE ENCOUNTER
We received Eliquis clearance..      I left vm for pt to call office,  need to remind pt to hold Eliquis 2 days prior.  Ty

## 2024-04-18 ENCOUNTER — OFFICE VISIT (OUTPATIENT)
Dept: CARDIAC REHAB | Facility: HOSPITAL | Age: 84
End: 2024-04-18

## 2024-04-18 VITALS
HEART RATE: 74 BPM | OXYGEN SATURATION: 95 % | BODY MASS INDEX: 32.58 KG/M2 | WEIGHT: 240.3 LBS | SYSTOLIC BLOOD PRESSURE: 110 MMHG | DIASTOLIC BLOOD PRESSURE: 60 MMHG

## 2024-04-18 DIAGNOSIS — Z95.5 HISTORY OF HEART ARTERY STENT: Primary | ICD-10-CM

## 2024-04-19 ENCOUNTER — ANESTHESIA EVENT (OUTPATIENT)
Dept: GASTROENTEROLOGY | Facility: HOSPITAL | Age: 84
End: 2024-04-19
Payer: MEDICARE

## 2024-04-19 NOTE — ANESTHESIA PREPROCEDURE EVALUATION
Anesthesia Evaluation     Patient summary reviewed and Nursing notes reviewed   NPO Solid Status: > 8 hours  NPO Liquid Status: > 8 hours           Airway   Mallampati: II  TM distance: >3 FB  Neck ROM: full  No difficulty expected and Large neck circumference  Dental - normal exam   (+) partials    Pulmonary     breath sounds clear to auscultation  (+) ,sleep apnea  Cardiovascular - normal exam  Exercise tolerance: good (4-7 METS)    ECG reviewed  PT is on anticoagulation therapy  Patient on routine beta blocker  Rhythm: regular  Rate: normal    (+) hypertension well controlled 2 medications or greater, past MI (2007)  >12 months, CAD, cardiac stents more than 12 months ago , dysrhythmias Atrial Fib, hyperlipidemia      Neuro/Psych  GI/Hepatic/Renal/Endo    (+) GERD well controlled, renal disease (CKD stage 3)- CRI, thyroid problem hypothyroidism    Musculoskeletal     Abdominal    Substance History      OB/GYN          Other        ROS/Med Hx Other: Cardiac Clearance- Dr. 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  )  intravenous induction     Anesthetic plan, risks, benefits, and alternatives have been provided, discussed and informed consent has been obtained with: patient and spouse/significant other.  Pre-procedure education provided  Plan discussed with CRNA.      CODE STATUS:

## 2024-04-22 ENCOUNTER — ANESTHESIA (OUTPATIENT)
Dept: GASTROENTEROLOGY | Facility: HOSPITAL | Age: 84
End: 2024-04-22
Payer: MEDICARE

## 2024-04-22 ENCOUNTER — HOSPITAL ENCOUNTER (OUTPATIENT)
Facility: HOSPITAL | Age: 84
Setting detail: HOSPITAL OUTPATIENT SURGERY
Discharge: HOME | End: 2024-04-22
Attending: INTERNAL MEDICINE | Admitting: INTERNAL MEDICINE
Payer: MEDICARE

## 2024-04-22 VITALS
DIASTOLIC BLOOD PRESSURE: 82 MMHG | RESPIRATION RATE: 16 BRPM | BODY MASS INDEX: 33.89 KG/M2 | WEIGHT: 242.06 LBS | SYSTOLIC BLOOD PRESSURE: 138 MMHG | HEART RATE: 60 BPM | TEMPERATURE: 98.7 F | HEIGHT: 71 IN | OXYGEN SATURATION: 97 %

## 2024-04-22 DIAGNOSIS — Z86.010 HISTORY OF COLON POLYPS: ICD-10-CM

## 2024-04-22 DIAGNOSIS — Z12.11 COLON CANCER SCREENING: ICD-10-CM

## 2024-04-22 PROCEDURE — 45385 COLONOSCOPY W/LESION REMOVAL: CPT | Performed by: INTERNAL MEDICINE

## 2024-04-22 PROCEDURE — 88305 TISSUE EXAM BY PATHOLOGIST: CPT | Performed by: INTERNAL MEDICINE

## 2024-04-22 PROCEDURE — 25810000003 SODIUM CHLORIDE 0.9 % SOLUTION

## 2024-04-22 PROCEDURE — 25810000003 LACTATED RINGERS PER 1000 ML: Performed by: NURSE ANESTHETIST, CERTIFIED REGISTERED

## 2024-04-22 PROCEDURE — 25010000002 PROPOFOL 10 MG/ML EMULSION: Performed by: NURSE ANESTHETIST, CERTIFIED REGISTERED

## 2024-04-22 RX ORDER — PROPOFOL 10 MG/ML
VIAL (ML) INTRAVENOUS CONTINUOUS PRN
Status: DISCONTINUED | OUTPATIENT
Start: 2024-04-22 | End: 2024-04-22 | Stop reason: SURG

## 2024-04-22 RX ORDER — SODIUM CHLORIDE 9 MG/ML
9 INJECTION, SOLUTION INTRAVENOUS CONTINUOUS
Status: DISCONTINUED | OUTPATIENT
Start: 2024-04-22 | End: 2024-04-22 | Stop reason: HOSPADM

## 2024-04-22 RX ORDER — LIDOCAINE HYDROCHLORIDE 20 MG/ML
INJECTION, SOLUTION EPIDURAL; INFILTRATION; INTRACAUDAL; PERINEURAL AS NEEDED
Status: DISCONTINUED | OUTPATIENT
Start: 2024-04-22 | End: 2024-04-22 | Stop reason: SURG

## 2024-04-22 RX ORDER — SODIUM CHLORIDE, SODIUM LACTATE, POTASSIUM CHLORIDE, CALCIUM CHLORIDE 600; 310; 30; 20 MG/100ML; MG/100ML; MG/100ML; MG/100ML
INJECTION, SOLUTION INTRAVENOUS CONTINUOUS PRN
Status: DISCONTINUED | OUTPATIENT
Start: 2024-04-22 | End: 2024-04-22 | Stop reason: SURG

## 2024-04-22 RX ADMIN — SODIUM CHLORIDE 9 ML: 9 INJECTION, SOLUTION INTRAVENOUS at 07:40

## 2024-04-22 RX ADMIN — LIDOCAINE HYDROCHLORIDE 70 MG: 20 INJECTION, SOLUTION EPIDURAL; INFILTRATION; INTRACAUDAL; PERINEURAL at 09:18

## 2024-04-22 RX ADMIN — PROPOFOL 165 MCG/KG/MIN: 10 INJECTION, EMULSION INTRAVENOUS at 09:18

## 2024-04-22 RX ADMIN — SODIUM CHLORIDE, POTASSIUM CHLORIDE, SODIUM LACTATE AND CALCIUM CHLORIDE: 600; 310; 30; 20 INJECTION, SOLUTION INTRAVENOUS at 09:03

## 2024-04-22 NOTE — H&P
ScreeningPre Procedure History & Physical    Chief Complaint:   Screening     Subjective     HPI:   Screening     Past Medical History:   Past Medical History:   Diagnosis Date    Atrial fibrillation     Benign prostatic hyperplasia without lower urinary tract symptoms     Chronic kidney disease, stage 3     Colon polyp     Elevated cholesterol     Essential (primary) hypertension     Hyperlipidemia, unspecified     Hypothyroidism, unspecified     Myocardial infarction     Sleep apnea     Vitamin D deficiency, unspecified        Past Surgical History:  Past Surgical History:   Procedure Laterality Date    CARDIAC CATHETERIZATION      COLONOSCOPY      CORONARY STENT PLACEMENT      PROSTATE SURGERY         Family History:  Family History   Problem Relation Age of Onset    Colon cancer Neg Hx        Social History:   reports that he quit smoking about 53 years ago. His smoking use included cigarettes. He started smoking about 73 years ago. He has a 20 pack-year smoking history. He has been exposed to tobacco smoke. He has never used smokeless tobacco. He reports current alcohol use. He reports that he does not use drugs.    Medications:   Medications Prior to Admission   Medication Sig Dispense Refill Last Dose    amitriptyline (ELAVIL) 75 MG tablet Take 0.5 tablets by mouth Every Night.   4/21/2024    amLODIPine (NORVASC) 10 MG tablet Take 1 tablet by mouth Daily.   4/21/2024    atorvastatin (LIPITOR) 80 MG tablet Take 1 tablet by mouth Daily. 90 tablet 3 4/21/2024    cholecalciferol (VITAMIN D3) 25 MCG (1000 UT) tablet Take 1 tablet by mouth Daily.   4/21/2024    cyanocobalamin 1000 MCG/ML injection Inject 1 mL into the appropriate muscle as directed by prescriber Every 28 (Twenty-Eight) Days.   Past Month    empagliflozin (JARDIANCE) 10 MG tablet tablet Take 1 tablet by mouth Daily.   4/21/2024    ezetimibe (ZETIA) 10 MG tablet Take 1 tablet by mouth Daily. 90 tablet 3 4/21/2024    furosemide (LASIX) 20 MG tablet  "Take 1 tablet by mouth Daily. 1 tab six days a week. 90 tablet 3 4/21/2024    irbesartan (AVAPRO) 300 MG tablet TAKE 1 TABLET BY MOUTH EVERY NIGHT 90 tablet 0 4/21/2024    levothyroxine (SYNTHROID, LEVOTHROID) 50 MCG tablet Take 1 tablet by mouth Daily.   4/21/2024    pantoprazole (Protonix) 40 MG EC tablet Take 1 tablet by mouth Daily. 60 tablet 3 4/21/2024    sotalol (BETAPACE) 80 MG tablet Take 0.5 tablets by mouth 2 (Two) Times a Day.   4/21/2024    spironolactone (ALDACTONE) 25 MG tablet Take 1 tablet by mouth.   4/21/2024    apixaban (ELIQUIS) 2.5 MG tablet tablet Take 1 tablet by mouth 2 (Two) Times a Day.   4/19/2024       Allergies:  Lisinopril        Objective     Blood pressure 146/86, pulse 71, temperature 97.6 °F (36.4 °C), temperature source Temporal, resp. rate 18, height 180.3 cm (71\"), weight 110 kg (242 lb 1 oz), SpO2 97%.    Physical Exam   Constitutional: Pt is oriented to person, place, and time and well-developed, well-nourished, and in no distress.   Mouth/Throat: Oropharynx is clear and moist.   Neck: Normal range of motion.   Cardiovascular: Normal rate, regular rhythm and normal heart sounds.    Pulmonary/Chest: Effort normal and breath sounds normal.   Abdominal: Soft. Nontender  Skin: Skin is warm and dry.   Psychiatric: Mood, memory, affect and judgment normal.     Assessment & Plan     Diagnosis:  Screening colonoscopy  H/o colon polyps     Anticipated Surgical Procedure:  colonoscopy    The risks, benefits, and alternatives of this procedure have been discussed with the patient or the responsible party- the patient understands and agrees to proceed.            "

## 2024-04-22 NOTE — ANESTHESIA POSTPROCEDURE EVALUATION
Patient: Michael Sol    Procedure Summary       Date: 04/22/24 Room / Location: [facility] ENDOSCOPY 2 / [facility] ENDOSCOPY    Anesthesia Start: 0907 Anesthesia Stop: 0946    Procedure: COLONOSCOPY WITH POLYPECTOMY Diagnosis:       History of colon polyps      Colon cancer screening      (History of colon polyps [Z86.010])      (Colon cancer screening [Z12.11])    Surgeons: Derrick Garcia MD Provider: Raghav Rodriguez CRNA    Anesthesia Type: general ASA Status: 3            Anesthesia Type: general    Vitals  Vitals Value Taken Time   /81 04/22/24 0957   Temp 36.2 °C (97.2 °F) 04/22/24 0946   Pulse 66 04/22/24 0959   Resp 16 04/22/24 0956   SpO2 97 % 04/22/24 0959   Vitals shown include unfiled device data.        Post Anesthesia Care and Evaluation    Patient location during evaluation: bedside  Patient participation: complete - patient participated  Level of consciousness: awake  Pain management: adequate    Airway patency: patent  Anesthetic complications: No anesthetic complications  PONV Status: controlled  Cardiovascular status: acceptable and stable  Respiratory status: acceptable

## 2024-04-22 NOTE — ANESTHESIA POSTPROCEDURE EVALUATION
Patient: Michael Sol    Procedure Summary       Date: 04/22/24 Room / Location: [facility] ENDOSCOPY 2 / [facility] ENDOSCOPY    Anesthesia Start: 0907 Anesthesia Stop: 0946    Procedure: COLONOSCOPY WITH POLYPECTOMY Diagnosis:       History of colon polyps      Colon cancer screening      (History of colon polyps [Z86.010])      (Colon cancer screening [Z12.11])    Surgeons: Derrick Garcia MD Provider: Raghav Rodriguez CRNA    Anesthesia Type: general ASA Status: 3            Anesthesia Type: general    Vitals  Vitals Value Taken Time   /82 04/22/24 1000   Temp 37.1 °C (98.7 °F) 04/22/24 1000   Pulse 60 04/22/24 1000   Resp 16 04/22/24 1000   SpO2 97 % 04/22/24 1000           Post Anesthesia Care and Evaluation    Patient location during evaluation: bedside  Patient participation: complete - patient participated  Level of consciousness: awake  Pain management: adequate    Airway patency: patent  Anesthetic complications: No anesthetic complications  PONV Status: controlled  Cardiovascular status: acceptable and stable  Respiratory status: acceptable

## 2024-04-23 LAB
CYTO UR: NORMAL
LAB AP CASE REPORT: NORMAL
LAB AP CLINICAL INFORMATION: NORMAL
PATH REPORT.FINAL DX SPEC: NORMAL
PATH REPORT.GROSS SPEC: NORMAL

## 2024-04-25 ENCOUNTER — OFFICE VISIT (OUTPATIENT)
Dept: CARDIAC REHAB | Facility: HOSPITAL | Age: 84
End: 2024-04-25

## 2024-04-25 VITALS
SYSTOLIC BLOOD PRESSURE: 110 MMHG | BODY MASS INDEX: 33.82 KG/M2 | OXYGEN SATURATION: 95 % | HEART RATE: 62 BPM | DIASTOLIC BLOOD PRESSURE: 80 MMHG | WEIGHT: 242.51 LBS

## 2024-04-25 DIAGNOSIS — Z95.5 HISTORY OF HEART ARTERY STENT: Primary | ICD-10-CM

## 2024-04-30 ENCOUNTER — OFFICE VISIT (OUTPATIENT)
Dept: CARDIAC REHAB | Facility: HOSPITAL | Age: 84
End: 2024-04-30

## 2024-04-30 VITALS
SYSTOLIC BLOOD PRESSURE: 102 MMHG | WEIGHT: 239.42 LBS | DIASTOLIC BLOOD PRESSURE: 68 MMHG | HEART RATE: 69 BPM | OXYGEN SATURATION: 96 % | BODY MASS INDEX: 33.39 KG/M2

## 2024-04-30 DIAGNOSIS — Z95.5 HISTORY OF HEART ARTERY STENT: Primary | ICD-10-CM

## 2024-05-02 ENCOUNTER — OFFICE VISIT (OUTPATIENT)
Dept: CARDIAC REHAB | Facility: HOSPITAL | Age: 84
End: 2024-05-02

## 2024-05-02 VITALS
OXYGEN SATURATION: 96 % | WEIGHT: 239.64 LBS | DIASTOLIC BLOOD PRESSURE: 60 MMHG | SYSTOLIC BLOOD PRESSURE: 106 MMHG | HEART RATE: 67 BPM | BODY MASS INDEX: 33.42 KG/M2

## 2024-05-02 DIAGNOSIS — Z95.5 HISTORY OF HEART ARTERY STENT: Primary | ICD-10-CM

## 2024-05-09 ENCOUNTER — APPOINTMENT (OUTPATIENT)
Dept: CARDIAC REHAB | Facility: HOSPITAL | Age: 84
End: 2024-05-09

## 2024-05-13 ENCOUNTER — HOSPITAL ENCOUNTER (OUTPATIENT)
Dept: GENERAL RADIOLOGY | Facility: HOSPITAL | Age: 84
Discharge: HOME | End: 2024-05-13
Payer: MEDICARE

## 2024-05-13 ENCOUNTER — OFFICE VISIT (OUTPATIENT)
Dept: INTERNAL MEDICINE | Age: 84
End: 2024-05-13
Payer: MEDICARE

## 2024-05-13 VITALS
BODY MASS INDEX: 33.46 KG/M2 | TEMPERATURE: 98.5 F | HEIGHT: 71 IN | DIASTOLIC BLOOD PRESSURE: 73 MMHG | SYSTOLIC BLOOD PRESSURE: 126 MMHG | WEIGHT: 239 LBS | OXYGEN SATURATION: 94 % | HEART RATE: 59 BPM

## 2024-05-13 DIAGNOSIS — M54.6 ACUTE RIGHT-SIDED THORACIC BACK PAIN: ICD-10-CM

## 2024-05-13 DIAGNOSIS — J06.9 ACUTE URI: ICD-10-CM

## 2024-05-13 DIAGNOSIS — R05.9 COUGH IN ADULT: ICD-10-CM

## 2024-05-13 DIAGNOSIS — M54.6 ACUTE RIGHT-SIDED THORACIC BACK PAIN: Primary | ICD-10-CM

## 2024-05-13 PROCEDURE — 3074F SYST BP LT 130 MM HG: CPT | Performed by: INTERNAL MEDICINE

## 2024-05-13 PROCEDURE — 99214 OFFICE O/P EST MOD 30 MIN: CPT | Performed by: INTERNAL MEDICINE

## 2024-05-13 PROCEDURE — 71046 X-RAY EXAM CHEST 2 VIEWS: CPT

## 2024-05-13 PROCEDURE — 72100 X-RAY EXAM L-S SPINE 2/3 VWS: CPT

## 2024-05-13 PROCEDURE — 3078F DIAST BP <80 MM HG: CPT | Performed by: INTERNAL MEDICINE

## 2024-05-13 PROCEDURE — 1126F AMNT PAIN NOTED NONE PRSNT: CPT | Performed by: INTERNAL MEDICINE

## 2024-05-13 PROCEDURE — 72072 X-RAY EXAM THORAC SPINE 3VWS: CPT

## 2024-05-13 RX ORDER — BACLOFEN 10 MG/1
10 TABLET ORAL 3 TIMES DAILY PRN
Qty: 30 TABLET | Refills: 0 | Status: SHIPPED | OUTPATIENT
Start: 2024-05-13

## 2024-05-13 RX ORDER — TRAMADOL HYDROCHLORIDE 50 MG/1
50 TABLET ORAL EVERY 6 HOURS PRN
Qty: 30 TABLET | Refills: 0 | Status: SHIPPED | OUTPATIENT
Start: 2024-05-13

## 2024-05-13 RX ORDER — AZITHROMYCIN 250 MG/1
TABLET, FILM COATED ORAL
Qty: 6 TABLET | Refills: 0 | Status: SHIPPED | OUTPATIENT
Start: 2024-05-13 | End: 2024-05-18

## 2024-05-13 NOTE — PROGRESS NOTES
"CHIEF COMPLAINT/ HPI:  Cough (Productive cough, several weeks, soa, green yellow mucus, has taken otc meds with no relief. Low back pain rt side. Pt states pain scale at 5. )  Patient has been complaining of right flank pain back pain for about 2 months, says been getting a little worse, no new issues as far as trauma or change in work status, no rash noted, no recent falls,            Objective   Vital Signs  Vitals:    05/13/24 0853   BP: 126/73   Pulse: 59   Temp: 98.5 °F (36.9 °C)   SpO2: 94%   Weight: 108 kg (239 lb)   Height: 180.3 cm (70.98\")      Body mass index is 33.35 kg/m².  Review of Systems as above, right flank pain, no dysuria, no vomiting no change in bowels or diarrhea,  Physical Exam patient has some tenderness and tightness to the right paraspinal muscles on the right flank area just inferior to the rib costal margin region posteriorly, no rashes seen, he has intact strength in his lower legs with flexion extension of the right hip negative straight leg raise on the right, patient has some positive inspiratory crackles at the left lung base about a third of the way up, otherwise clear posterior cardiac exam regular rhythm, he is ambulatory alert and oriented x 3    Result Review :   Lab Results   Component Value Date    PROBNP 30.7 09/14/2021    PROBNP 17.7 06/23/2021     CMP          8/11/2023    06:45 2/1/2024    07:18 3/20/2024    12:34   CMP   Glucose 118  115  112    BUN 21  28  33    Creatinine 1.42  1.75  1.86    EGFR 49.0  38.2  35.5    Sodium 139  136  136    Potassium 4.7  5.2  4.6    Chloride 104  102  101    Calcium 9.4  9.1  9.5    Total Protein 6.0   6.5    Albumin 4.1  4.1  4.4    Globulin 1.9   2.1    Total Bilirubin 0.4   0.5    Alkaline Phosphatase 65   65    AST (SGOT) 17   16    ALT (SGPT) 15   17    Albumin/Globulin Ratio 2.2   2.1    BUN/Creatinine Ratio 14.8  16.0  17.7    Anion Gap 11.9  8.6  10.0      CBC w/diff          8/11/2023    06:45 2/1/2024    07:18 3/20/2024    " 12:34   CBC w/Diff   WBC 4.91  5.92  6.03    RBC 4.56  4.38  4.36    Hemoglobin 14.4  13.6  13.7    Hematocrit 42.7  40.1  40.0    MCV 93.6  91.6  91.7    MCH 31.6  31.1  31.4    MCHC 33.7  33.9  34.3    RDW 13.6  12.7  12.9    Platelets 146  157  138    Neutrophil Rel % 55.8  54.8  56.3    Immature Granulocyte Rel % 0.2  0.5  0.3    Lymphocyte Rel % 26.7  26.5  27.5    Monocyte Rel % 10.6  11.3  10.0    Eosinophil Rel % 5.9  6.1  5.1    Basophil Rel % 0.8  0.8  0.8       Lipid Panel          6/21/2023    07:03 3/20/2024    12:34   Lipid Panel   Total Cholesterol 124  134    Triglycerides 311  329    HDL Cholesterol 30  38    VLDL Cholesterol 48  50    LDL Cholesterol  46  46    LDL/HDL Ratio 1.06  0.79       Lab Results   Component Value Date    TSH 2.110 03/20/2024    TSH 3.140 02/07/2023    TSH 3.150 08/15/2022      Lab Results   Component Value Date    FREET4 1.32 03/20/2024    FREET4 1.26 02/07/2023    FREET4 1.36 08/15/2022      A1C Last 3 Results          8/11/2023    06:45 3/20/2024    12:34   HGBA1C Last 3 Results   Hemoglobin A1C 5.60  6.10       PSA          3/20/2024    12:34   PSA   PSA 0.105                     Visit Diagnoses:    ICD-10-CM ICD-9-CM   1. Acute right-sided thoracic back pain  M54.6 724.1   2. Acute URI  J06.9 465.9   3. Cough in adult  R05.9 786.2       Assessment and Plan   Diagnoses and all orders for this visit:    1. Acute right-sided thoracic back pain (Primary)  -     XR Chest PA & Lateral; Future  -     XR Spine Lumbar 2 or 3 View; Future  -     XR Spine Thoracic 3 View; Future  -     traMADol (ULTRAM) 50 MG tablet; Take 1 tablet by mouth Every 6 (Six) Hours As Needed for Moderate Pain.  Dispense: 30 tablet; Refill: 0    2. Acute URI  -     traMADol (ULTRAM) 50 MG tablet; Take 1 tablet by mouth Every 6 (Six) Hours As Needed for Moderate Pain.  Dispense: 30 tablet; Refill: 0    3. Cough in adult  -     traMADol (ULTRAM) 50 MG tablet; Take 1 tablet by mouth Every 6 (Six) Hours As  Needed for Moderate Pain.  Dispense: 30 tablet; Refill: 0    Other orders  -     baclofen (LIORESAL) 10 MG tablet; Take 1 tablet by mouth 3 (Three) Times a Day As Needed for Muscle Spasms.  Dispense: 30 tablet; Refill: 0  -     azithromycin (Zithromax Z-Iker) 250 MG tablet; Take 2 tablets the first day, then 1 tablet daily for 4 days.  Dispense: 6 tablet; Refill: 0        Right back and flank pain etiology unclear most likely musculoskeletal will x-rays thoracic spine, KUB, previous CT scan December 2022 showed no acute intra-abdominal findings other than mild diverticulosis of the descending and sigmoid colon, treatment options discussed    Cough upper respiratory infection consider treatment             Follow Up   No follow-ups on file.  Patient was given instructions and counseling regarding his condition or for health maintenance advice. Please see specific information pulled into the AVS if appropriate.

## 2024-05-14 ENCOUNTER — TELEPHONE (OUTPATIENT)
Dept: INTERNAL MEDICINE | Age: 84
End: 2024-05-14
Payer: MEDICARE

## 2024-05-14 ENCOUNTER — APPOINTMENT (OUTPATIENT)
Dept: CARDIAC REHAB | Facility: HOSPITAL | Age: 84
End: 2024-05-14

## 2024-05-14 NOTE — TELEPHONE ENCOUNTER
I called pt with xray and t12, vertebral fx and my rec to monitor and use heat , nsaids and will consider bone density test ??,  Will discuss at his next visit in October, could consider checking hormone levels but he is 84 years old and I do not know giving hormones would be the best option, could consider Boniva if necessary, otherwise he is to continue the medication that we gave him yesterday

## 2024-06-04 ENCOUNTER — OFFICE VISIT (OUTPATIENT)
Dept: CARDIAC REHAB | Facility: HOSPITAL | Age: 84
End: 2024-06-04

## 2024-06-04 VITALS
WEIGHT: 233.91 LBS | OXYGEN SATURATION: 97 % | HEART RATE: 65 BPM | DIASTOLIC BLOOD PRESSURE: 60 MMHG | SYSTOLIC BLOOD PRESSURE: 102 MMHG | BODY MASS INDEX: 32.64 KG/M2

## 2024-06-04 DIAGNOSIS — Z95.5 HISTORY OF HEART ARTERY STENT: Primary | ICD-10-CM

## 2024-06-06 ENCOUNTER — OFFICE VISIT (OUTPATIENT)
Dept: CARDIAC REHAB | Facility: HOSPITAL | Age: 84
End: 2024-06-06

## 2024-06-06 DIAGNOSIS — Z95.5 HISTORY OF HEART ARTERY STENT: Primary | ICD-10-CM

## 2024-06-11 ENCOUNTER — OFFICE VISIT (OUTPATIENT)
Dept: CARDIAC REHAB | Facility: HOSPITAL | Age: 84
End: 2024-06-11

## 2024-06-11 VITALS
BODY MASS INDEX: 32.73 KG/M2 | DIASTOLIC BLOOD PRESSURE: 64 MMHG | HEART RATE: 68 BPM | OXYGEN SATURATION: 96 % | WEIGHT: 234.57 LBS | SYSTOLIC BLOOD PRESSURE: 124 MMHG

## 2024-06-11 DIAGNOSIS — Z95.5 HISTORY OF HEART ARTERY STENT: Primary | ICD-10-CM

## 2024-06-12 ENCOUNTER — TELEPHONE (OUTPATIENT)
Dept: INTERNAL MEDICINE | Age: 84
End: 2024-06-12
Payer: MEDICARE

## 2024-06-12 NOTE — TELEPHONE ENCOUNTER
Call patient tell him that it is most likely the muscle relaxer ==BACLOFEN ===that I gave him last month on May 13, according to the medical literature that is the one drug that can cause a metallic taste in his mouth and he needs to stop taking it completely from now on and see if that helps

## 2024-06-12 NOTE — TELEPHONE ENCOUNTER
Caller: Michael Sol    Relationship: Self    Best call back number: [phone]    What is the best time to reach you: ANY    Who are you requesting to speak with (clinical staff, provider,  specific staff member): CLINICAL      What was the call regarding: PATIENT STATED HE HAS HAD A METALLIC TASTE IN HIS MOUTH FOR THE PAST TWO WEEKS. HE VISITED THE DENTIST FOR THIS CONCERN AND THE DENTIST STATED IT COULD BE HIS MEDICATIONS CAUSING THIS TASTE. PATIENT WOULD LIKE CLINICAL STAFF TO REVIEW HIS MEDICINES AND SEE IF ANYTHING COULD BE THE CAUSE.

## 2024-06-13 ENCOUNTER — APPOINTMENT (OUTPATIENT)
Dept: CARDIAC REHAB | Facility: HOSPITAL | Age: 84
End: 2024-06-13

## 2024-06-18 ENCOUNTER — APPOINTMENT (OUTPATIENT)
Dept: CARDIAC REHAB | Facility: HOSPITAL | Age: 84
End: 2024-06-18

## 2024-06-20 ENCOUNTER — OFFICE VISIT (OUTPATIENT)
Dept: CARDIAC REHAB | Facility: HOSPITAL | Age: 84
End: 2024-06-20

## 2024-06-20 VITALS
OXYGEN SATURATION: 95 % | DIASTOLIC BLOOD PRESSURE: 70 MMHG | HEART RATE: 63 BPM | SYSTOLIC BLOOD PRESSURE: 110 MMHG | WEIGHT: 239.42 LBS | BODY MASS INDEX: 33.41 KG/M2

## 2024-06-20 DIAGNOSIS — Z95.5 HISTORY OF HEART ARTERY STENT: Primary | ICD-10-CM

## 2024-06-24 ENCOUNTER — TELEPHONE (OUTPATIENT)
Dept: INTERNAL MEDICINE | Age: 84
End: 2024-06-24

## 2024-06-24 ENCOUNTER — TELEPHONE (OUTPATIENT)
Dept: GASTROENTEROLOGY | Facility: CLINIC | Age: 84
End: 2024-06-24
Payer: MEDICARE

## 2024-06-24 NOTE — TELEPHONE ENCOUNTER
Caller: Michael Sol    Relationship to patient: Self    Best call back number: [phone]    Patient is needing: PATIENT CALLED STATING HE HAD SPOKE TO MD MOREIRA A FEW WEEKS BACK REGARDING A METALLIC TASTE HE WAS HAVING IN HIS MOUTH. PATIENT STATES HE WAS TOLD TO STOP TAKING HIS BACLOFEN AND SEE IF THIS HELPED THE SYMPTOMS. PATIENT STATES HE HAS STOPPED THAT MEDICATION BUT THE TASTE IN HIS MOUTH HAS NOT GONE AWAY. WOULD LIKE A CALL BACK TO DISCUSS.

## 2024-06-24 NOTE — TELEPHONE ENCOUNTER
Caller: Michael Sol    Relationship to patient: Self    Best call back number: [phone]    Patient is needing: PT CALLED AND SAID HE'S BEEN HAVING A METALLIC TASTE IN HIS MOUTH AND ISN'T SURE WHAT'S CAUSING IT. HE WANTED TO KNOW IF DR. MORGAN COULD PRESCRIBE HIM ANYTHING FOR IT. PLEASE ADVISE.

## 2024-06-24 NOTE — TELEPHONE ENCOUNTER
Left a vm for the patient to call the office regarding this message. Patient needs an appt and to bring all of his meds.

## 2024-06-24 NOTE — TELEPHONE ENCOUNTER
Per Elsy Flores, BILL  pt to contact PCP.    (It appears pt has spoken with PCP office last week regarding this)        As I was leaving  to call office, the call got disconnected. [email or web address]

## 2024-06-24 NOTE — TELEPHONE ENCOUNTER
Spoke with the patient and informed him of the response from Elsy KHAN. He stated that he would contact Dr. Salazar's office.

## 2024-06-25 ENCOUNTER — OFFICE VISIT (OUTPATIENT)
Dept: INTERNAL MEDICINE | Age: 84
End: 2024-06-25
Payer: MEDICARE

## 2024-06-25 ENCOUNTER — OFFICE VISIT (OUTPATIENT)
Dept: CARDIAC REHAB | Facility: HOSPITAL | Age: 84
End: 2024-06-25

## 2024-06-25 VITALS
BODY MASS INDEX: 33.65 KG/M2 | OXYGEN SATURATION: 95 % | SYSTOLIC BLOOD PRESSURE: 116 MMHG | HEART RATE: 61 BPM | WEIGHT: 241.18 LBS | DIASTOLIC BLOOD PRESSURE: 60 MMHG

## 2024-06-25 VITALS
HEART RATE: 59 BPM | TEMPERATURE: 98 F | WEIGHT: 241 LBS | OXYGEN SATURATION: 95 % | BODY MASS INDEX: 33.74 KG/M2 | HEIGHT: 71 IN

## 2024-06-25 DIAGNOSIS — N18.32 STAGE 3B CHRONIC KIDNEY DISEASE: ICD-10-CM

## 2024-06-25 DIAGNOSIS — Z95.5 HISTORY OF HEART ARTERY STENT: Primary | ICD-10-CM

## 2024-06-25 DIAGNOSIS — E78.2 MIXED HYPERLIPIDEMIA: ICD-10-CM

## 2024-06-25 DIAGNOSIS — K21.9 GASTROESOPHAGEAL REFLUX DISEASE WITHOUT ESOPHAGITIS: ICD-10-CM

## 2024-06-25 DIAGNOSIS — R73.01 IFG (IMPAIRED FASTING GLUCOSE): ICD-10-CM

## 2024-06-25 DIAGNOSIS — R43.2 TASTE SENSE ALTERED: Primary | ICD-10-CM

## 2024-06-25 PROCEDURE — 99213 OFFICE O/P EST LOW 20 MIN: CPT | Performed by: INTERNAL MEDICINE

## 2024-06-25 PROCEDURE — 1126F AMNT PAIN NOTED NONE PRSNT: CPT | Performed by: INTERNAL MEDICINE

## 2024-06-25 RX ORDER — ICOSAPENT ETHYL 1000 MG/1
2 CAPSULE ORAL DAILY
COMMUNITY
Start: 2024-03-25

## 2024-06-25 NOTE — PROGRESS NOTES
"CHIEF COMPLAINT/ HPI: Patient is here with persistent metallic taste in his mouth, he is concerned,  Hypertension (Medication management follow up . Pt states metallic taste in mouth for one month.)    Patient did see dental for second opinion no specific findings, June 2024    Patient colonoscopy: Prep April 2024      Objective   Vital Signs  Vitals:    06/25/24 1044   Pulse: 59   Temp: 98 °F (36.7 °C)   SpO2: 95%   Weight: 109 kg (241 lb)   Height: 180.3 cm (70.98\")      Body mass index is 33.63 kg/m².  Review of Systems metallic taste in his mouth, no change in medications recently,  Physical Exam lungs are clear posterior cardiac exam regular rhythm with 1/6 systolic murmur upper sternal border teeth show no significant findings tongue is normal no thrush, no erythema exudates or ulcerations noted in the mouth, neck otherwise no adenopathy, patient is alert and oriented x 3  Result Review :   Lab Results   Component Value Date    PROBNP 30.7 09/14/2021    PROBNP 17.7 06/23/2021     CMP          8/11/2023    06:45 2/1/2024    07:18 3/20/2024    12:34   CMP   Glucose 118  115  112    BUN 21  28  33    Creatinine 1.42  1.75  1.86    EGFR 49.0  38.2  35.5    Sodium 139  136  136    Potassium 4.7  5.2  4.6    Chloride 104  102  101    Calcium 9.4  9.1  9.5    Total Protein 6.0   6.5    Albumin 4.1  4.1  4.4    Globulin 1.9   2.1    Total Bilirubin 0.4   0.5    Alkaline Phosphatase 65   65    AST (SGOT) 17   16    ALT (SGPT) 15   17    Albumin/Globulin Ratio 2.2   2.1    BUN/Creatinine Ratio 14.8  16.0  17.7    Anion Gap 11.9  8.6  10.0      CBC w/diff          8/11/2023    06:45 2/1/2024    07:18 3/20/2024    12:34   CBC w/Diff   WBC 4.91  5.92  6.03    RBC 4.56  4.38  4.36    Hemoglobin 14.4  13.6  13.7    Hematocrit 42.7  40.1  40.0    MCV 93.6  91.6  91.7    MCH 31.6  31.1  31.4    MCHC 33.7  33.9  34.3    RDW 13.6  12.7  12.9    Platelets 146  157  138    Neutrophil Rel % 55.8  54.8  56.3    Immature Granulocyte " Rel % 0.2  0.5  0.3    Lymphocyte Rel % 26.7  26.5  27.5    Monocyte Rel % 10.6  11.3  10.0    Eosinophil Rel % 5.9  6.1  5.1    Basophil Rel % 0.8  0.8  0.8       Lipid Panel          3/20/2024    12:34   Lipid Panel   Total Cholesterol 134    Triglycerides 329    HDL Cholesterol 38    VLDL Cholesterol 50    LDL Cholesterol  46    LDL/HDL Ratio 0.79       Lab Results   Component Value Date    TSH 2.110 03/20/2024    TSH 3.140 02/07/2023    TSH 3.150 08/15/2022      Lab Results   Component Value Date    FREET4 1.32 03/20/2024    FREET4 1.26 02/07/2023    FREET4 1.36 08/15/2022      A1C Last 3 Results          8/11/2023    06:45 3/20/2024    12:34   HGBA1C Last 3 Results   Hemoglobin A1C 5.60  6.10       PSA          3/20/2024    12:34   PSA   PSA 0.105                     Visit Diagnoses:    ICD-10-CM ICD-9-CM   1. Taste sense altered  R43.2 781.1   2. Gastroesophageal reflux disease without esophagitis  K21.9 530.81   3. Mixed hyperlipidemia  E78.2 272.2   4. Stage 3b chronic kidney disease  N18.32 585.3   5. IFG  R73.01 790.21       Assessment and Plan   Diagnoses and all orders for this visit:    1. Taste sense altered (Primary)  -     Comprehensive Metabolic Panel; Future  -     Sedimentation Rate; Future  -     Vitamin B12 anemia; Future  -     Folate anemia; Future  -     Vitamin D,25-Hydroxy; Future  -     CBC & Differential; Future  -     Hemoglobin A1c; Future  -     Heavy Metals, Blood; Future  -     Ambulatory Referral to ENT (Otolaryngology)    2. Gastroesophageal reflux disease without esophagitis  -     Comprehensive Metabolic Panel; Future  -     Sedimentation Rate; Future  -     Vitamin B12 anemia; Future  -     Folate anemia; Future  -     Vitamin D,25-Hydroxy; Future  -     CBC & Differential; Future  -     Hemoglobin A1c; Future  -     Heavy Metals, Blood; Future    3. Mixed hyperlipidemia  -     Comprehensive Metabolic Panel; Future  -     Sedimentation Rate; Future  -     Vitamin B12 anemia;  Future  -     Folate anemia; Future  -     Vitamin D,25-Hydroxy; Future  -     CBC & Differential; Future  -     Hemoglobin A1c; Future  -     Heavy Metals, Blood; Future    4. Stage 3b chronic kidney disease  -     Comprehensive Metabolic Panel; Future  -     Sedimentation Rate; Future  -     Vitamin B12 anemia; Future  -     Folate anemia; Future  -     Vitamin D,25-Hydroxy; Future  -     CBC & Differential; Future  -     Hemoglobin A1c; Future  -     Heavy Metals, Blood; Future    5. IFG  -     Comprehensive Metabolic Panel; Future  -     Sedimentation Rate; Future  -     Vitamin B12 anemia; Future  -     Folate anemia; Future  -     Vitamin D,25-Hydroxy; Future  -     CBC & Differential; Future  -     Hemoglobin A1c; Future  -     Heavy Metals, Blood; Future  -     Ambulatory Referral to ENT (Otolaryngology)        Metallic taste in his mouth, recently was treated with azithromycin/baclofen,/tramadol as needed for chest pain back pain May 13, 2024, medications reviewed, consider ENT evaluation, dental consultation, June 25, 2024, patient could stop taking her we could switch medications including his amitriptyline, which can cause dry mouth etc.,, after discussion we are going to stop Zetia, but separate, Jardiance, or Farxiga and pantoprazole for the next couple months to see if it makes a difference in his mouth, he is going to continue cutting his amitriptyline in half, drinking plenty of fluids, continue using more sour, lemon type drinks type drinks    Right flank pain,abd pain ---- previous CT scan December 15, 2022 showed mild diverticulosis of the descending colon and sigmoid colon no evidence of infection small inguinal hernias containing fat are stable --- chest x-ray, right rib series, showed no active disease November 30, 2022,     Abdominal pains, right flank pain, ---CT scan unremarkable February 2022,, and as above, December 15, 2022    Impaired fasting glucose blood sugar 117 counseled on diet  walking low-carb diet March 25, 2024, hemoglobin A1c 6.1 up slightly March 2024, started on Jardiance 10 mg daily by Dr. hernández    CKD stage IIIa, creatinine 1.4-1.8 -seeing dr hernández since August 2022    Coronary artery disease ---previous stent placement 2007, continues aspirin,    Insomnia, continues  Elavil 75 mg tablet ---half tablet at bedtime,    Hypertension continues  Norvasc 10 mg daily, irbesartan 300 mg daily    Lower extremity edema ---continues Lasix 20 mg daily, spironolactone 25 mg daily    Vitamin D and B12 deficiency continues replacement    Hyperlipidemia continues Lipitor 80 mg daily, zetia 10 mg qd , Vascepa 1 g 2 tablets bid    Hypothyroidism continues  levothyroxine 50 mcg daily    Paroxysmal atrial fibrillation, continues  sotalol 40 mg twice a day--dr martinez     Mild thrombocytopenia, chronic,     Prostate Ca==, dr olivarez prostate CA, seed implants, 2012, PSA equals 0.1, March 2024    Overweight, discussed weight loss efforts,                 Follow Up   No follow-ups on file.  Patient was given instructions and counseling regarding his condition or for health maintenance advice. Please see specific information pulled into the AVS if appropriate.

## 2024-06-27 ENCOUNTER — OFFICE VISIT (OUTPATIENT)
Dept: CARDIAC REHAB | Facility: HOSPITAL | Age: 84
End: 2024-06-27

## 2024-06-27 VITALS
BODY MASS INDEX: 33.71 KG/M2 | HEART RATE: 65 BPM | OXYGEN SATURATION: 97 % | SYSTOLIC BLOOD PRESSURE: 120 MMHG | WEIGHT: 241.62 LBS | RESPIRATION RATE: 18 BRPM | DIASTOLIC BLOOD PRESSURE: 80 MMHG

## 2024-06-27 DIAGNOSIS — Z95.5 HISTORY OF HEART ARTERY STENT: Primary | ICD-10-CM

## 2024-07-02 ENCOUNTER — TELEPHONE (OUTPATIENT)
Dept: INTERNAL MEDICINE | Age: 84
End: 2024-07-02

## 2024-07-02 NOTE — TELEPHONE ENCOUNTER
Caller: Michael Sol    Relationship to patient: Self    Best call back number [phone]      Patient is needing: PATIENT CALLED IN AND SAID HE HAD RECEIVED A CALL FROM THIS OFFICE BUT THERE WAS NO MESSAGE. PATIENT IS REQUESTING A CALL BACK TO SEE WHAT THE CALL WAS REGARDING.

## 2024-07-09 ENCOUNTER — APPOINTMENT (OUTPATIENT)
Dept: CARDIAC REHAB | Facility: HOSPITAL | Age: 84
End: 2024-07-09

## 2024-07-11 ENCOUNTER — APPOINTMENT (OUTPATIENT)
Dept: CARDIAC REHAB | Facility: HOSPITAL | Age: 84
End: 2024-07-11

## 2024-07-18 ENCOUNTER — OFFICE VISIT (OUTPATIENT)
Dept: CARDIAC REHAB | Facility: HOSPITAL | Age: 84
End: 2024-07-18

## 2024-07-18 VITALS
SYSTOLIC BLOOD PRESSURE: 112 MMHG | HEART RATE: 63 BPM | OXYGEN SATURATION: 98 % | WEIGHT: 237.44 LBS | BODY MASS INDEX: 33.13 KG/M2 | DIASTOLIC BLOOD PRESSURE: 68 MMHG

## 2024-07-18 DIAGNOSIS — Z95.5 HISTORY OF HEART ARTERY STENT: Primary | ICD-10-CM

## 2024-07-23 ENCOUNTER — OFFICE VISIT (OUTPATIENT)
Dept: CARDIAC REHAB | Facility: HOSPITAL | Age: 84
End: 2024-07-23

## 2024-07-23 VITALS
HEART RATE: 66 BPM | WEIGHT: 235.89 LBS | DIASTOLIC BLOOD PRESSURE: 64 MMHG | OXYGEN SATURATION: 96 % | BODY MASS INDEX: 32.92 KG/M2 | SYSTOLIC BLOOD PRESSURE: 120 MMHG

## 2024-07-23 DIAGNOSIS — Z95.5 HISTORY OF HEART ARTERY STENT: Primary | ICD-10-CM

## 2024-07-25 ENCOUNTER — OFFICE VISIT (OUTPATIENT)
Dept: CARDIAC REHAB | Facility: HOSPITAL | Age: 84
End: 2024-07-25

## 2024-07-25 VITALS
SYSTOLIC BLOOD PRESSURE: 120 MMHG | DIASTOLIC BLOOD PRESSURE: 62 MMHG | WEIGHT: 236.99 LBS | HEART RATE: 64 BPM | BODY MASS INDEX: 33.07 KG/M2 | OXYGEN SATURATION: 95 %

## 2024-07-25 DIAGNOSIS — Z95.5 HISTORY OF HEART ARTERY STENT: Primary | ICD-10-CM

## 2024-07-30 ENCOUNTER — OFFICE VISIT (OUTPATIENT)
Dept: CARDIAC REHAB | Facility: HOSPITAL | Age: 84
End: 2024-07-30

## 2024-07-30 VITALS
DIASTOLIC BLOOD PRESSURE: 62 MMHG | HEART RATE: 65 BPM | OXYGEN SATURATION: 97 % | WEIGHT: 235.89 LBS | BODY MASS INDEX: 32.92 KG/M2 | SYSTOLIC BLOOD PRESSURE: 110 MMHG

## 2024-07-30 DIAGNOSIS — Z95.5 HISTORY OF HEART ARTERY STENT: Primary | ICD-10-CM

## 2024-08-01 ENCOUNTER — OFFICE VISIT (OUTPATIENT)
Dept: CARDIAC REHAB | Facility: HOSPITAL | Age: 84
End: 2024-08-01

## 2024-08-01 VITALS
BODY MASS INDEX: 33.22 KG/M2 | HEART RATE: 63 BPM | OXYGEN SATURATION: 95 % | DIASTOLIC BLOOD PRESSURE: 60 MMHG | WEIGHT: 238.1 LBS | SYSTOLIC BLOOD PRESSURE: 110 MMHG

## 2024-08-01 DIAGNOSIS — Z95.5 HISTORY OF HEART ARTERY STENT: Primary | ICD-10-CM

## 2024-08-02 ENCOUNTER — TRANSCRIBE ORDERS (OUTPATIENT)
Dept: ADMINISTRATIVE | Facility: HOSPITAL | Age: 84
End: 2024-08-02
Payer: MEDICARE

## 2024-08-02 DIAGNOSIS — K21.9 CHALASIA OF LOWER ESOPHAGEAL SPHINCTER: ICD-10-CM

## 2024-08-02 DIAGNOSIS — R60.0 LOCALIZED EDEMA: ICD-10-CM

## 2024-08-02 DIAGNOSIS — R80.1 PERSISTENT PROTEINURIA: ICD-10-CM

## 2024-08-02 DIAGNOSIS — I10 ESSENTIAL (PRIMARY) HYPERTENSION: ICD-10-CM

## 2024-08-02 DIAGNOSIS — N18.32 CHRONIC KIDNEY DISEASE (CKD) STAGE G3B/A1, MODERATELY DECREASED GLOMERULAR FILTRATION RATE (GFR) BETWEEN 30-44 ML/MIN/1.73 SQUARE METER AND ALBUMINURIA CREATININE RATIO LESS THAN 30 MG/G (CMS/H*: Primary | ICD-10-CM

## 2024-08-02 DIAGNOSIS — E55.9 VITAMIN D DEFICIENCY, UNSPECIFIED: ICD-10-CM

## 2024-08-02 DIAGNOSIS — I25.10 DISEASE OF CARDIOVASCULAR SYSTEM: ICD-10-CM

## 2024-08-02 DIAGNOSIS — E03.9 ACQUIRED HYPOTHYROIDISM: ICD-10-CM

## 2024-08-02 DIAGNOSIS — I48.91 ATRIAL FIBRILLATION, UNSPECIFIED TYPE: ICD-10-CM

## 2024-08-05 ENCOUNTER — LAB (OUTPATIENT)
Dept: LAB | Facility: HOSPITAL | Age: 84
End: 2024-08-05
Payer: MEDICARE

## 2024-08-05 DIAGNOSIS — R80.1 PERSISTENT PROTEINURIA: ICD-10-CM

## 2024-08-05 DIAGNOSIS — E78.2 MIXED HYPERLIPIDEMIA: ICD-10-CM

## 2024-08-05 DIAGNOSIS — K21.9 GASTROESOPHAGEAL REFLUX DISEASE WITHOUT ESOPHAGITIS: ICD-10-CM

## 2024-08-05 DIAGNOSIS — I25.10 DISEASE OF CARDIOVASCULAR SYSTEM: ICD-10-CM

## 2024-08-05 DIAGNOSIS — I48.91 ATRIAL FIBRILLATION, UNSPECIFIED TYPE: ICD-10-CM

## 2024-08-05 DIAGNOSIS — E03.9 ACQUIRED HYPOTHYROIDISM: ICD-10-CM

## 2024-08-05 DIAGNOSIS — E55.9 VITAMIN D DEFICIENCY, UNSPECIFIED: ICD-10-CM

## 2024-08-05 DIAGNOSIS — N18.32 CHRONIC KIDNEY DISEASE (CKD) STAGE G3B/A1, MODERATELY DECREASED GLOMERULAR FILTRATION RATE (GFR) BETWEEN 30-44 ML/MIN/1.73 SQUARE METER AND ALBUMINURIA CREATININE RATIO LESS THAN 30 MG/G (CMS/H*: ICD-10-CM

## 2024-08-05 DIAGNOSIS — R43.2 TASTE SENSE ALTERED: ICD-10-CM

## 2024-08-05 DIAGNOSIS — R60.0 LOCALIZED EDEMA: ICD-10-CM

## 2024-08-05 DIAGNOSIS — N18.32 STAGE 3B CHRONIC KIDNEY DISEASE: ICD-10-CM

## 2024-08-05 DIAGNOSIS — I10 ESSENTIAL (PRIMARY) HYPERTENSION: ICD-10-CM

## 2024-08-05 DIAGNOSIS — R73.01 IFG (IMPAIRED FASTING GLUCOSE): ICD-10-CM

## 2024-08-05 DIAGNOSIS — K21.9 CHALASIA OF LOWER ESOPHAGEAL SPHINCTER: ICD-10-CM

## 2024-08-05 LAB
25(OH)D3 SERPL-MCNC: 45.6 NG/ML (ref 30–100)
ALBUMIN SERPL-MCNC: 4 G/DL (ref 3.5–5.2)
ALBUMIN/GLOB SERPL: 1.7 G/DL
ALP SERPL-CCNC: 69 U/L (ref 39–117)
ALT SERPL W P-5'-P-CCNC: 13 U/L (ref 1–41)
ANION GAP SERPL CALCULATED.3IONS-SCNC: 9.8 MMOL/L (ref 5–15)
AST SERPL-CCNC: 14 U/L (ref 1–40)
BACTERIA UR QL AUTO: NORMAL /HPF
BASOPHILS # BLD AUTO: 0.03 10*3/MM3 (ref 0–0.2)
BASOPHILS NFR BLD AUTO: 0.5 % (ref 0–1.5)
BILIRUB SERPL-MCNC: 0.4 MG/DL (ref 0–1.2)
BILIRUB UR QL STRIP: NEGATIVE
BUN SERPL-MCNC: 32 MG/DL (ref 8–23)
BUN/CREAT SERPL: 16.7 (ref 7–25)
CALCIUM SPEC-SCNC: 9 MG/DL (ref 8.6–10.5)
CHLORIDE SERPL-SCNC: 99 MMOL/L (ref 98–107)
CLARITY UR: CLEAR
CO2 SERPL-SCNC: 27.2 MMOL/L (ref 22–29)
COLOR UR: YELLOW
CREAT SERPL-MCNC: 1.92 MG/DL (ref 0.76–1.27)
CREAT UR-MCNC: 127.1 MG/DL
DEPRECATED RDW RBC AUTO: 44.4 FL (ref 37–54)
EGFRCR SERPLBLD CKD-EPI 2021: 33.9 ML/MIN/1.73
EOSINOPHIL # BLD AUTO: 0.27 10*3/MM3 (ref 0–0.4)
EOSINOPHIL NFR BLD AUTO: 4.7 % (ref 0.3–6.2)
ERYTHROCYTE [DISTWIDTH] IN BLOOD BY AUTOMATED COUNT: 13 % (ref 12.3–15.4)
ERYTHROCYTE [SEDIMENTATION RATE] IN BLOOD: <1 MM/HR (ref 0–20)
GLOBULIN UR ELPH-MCNC: 2.3 GM/DL
GLUCOSE SERPL-MCNC: 130 MG/DL (ref 65–99)
GLUCOSE UR STRIP-MCNC: ABNORMAL MG/DL
HBA1C MFR BLD: 6 % (ref 4.8–5.6)
HCT VFR BLD AUTO: 43.6 % (ref 37.5–51)
HGB BLD-MCNC: 14.6 G/DL (ref 13–17.7)
HGB UR QL STRIP.AUTO: NEGATIVE
HYALINE CASTS UR QL AUTO: NORMAL /LPF
IMM GRANULOCYTES # BLD AUTO: 0.01 10*3/MM3 (ref 0–0.05)
IMM GRANULOCYTES NFR BLD AUTO: 0.2 % (ref 0–0.5)
KETONES UR QL STRIP: NEGATIVE
LEUKOCYTE ESTERASE UR QL STRIP.AUTO: NEGATIVE
LYMPHOCYTES # BLD AUTO: 1.61 10*3/MM3 (ref 0.7–3.1)
LYMPHOCYTES NFR BLD AUTO: 27.9 % (ref 19.6–45.3)
MCH RBC QN AUTO: 31.3 PG (ref 26.6–33)
MCHC RBC AUTO-ENTMCNC: 33.5 G/DL (ref 31.5–35.7)
MCV RBC AUTO: 93.6 FL (ref 79–97)
MONOCYTES # BLD AUTO: 0.6 10*3/MM3 (ref 0.1–0.9)
MONOCYTES NFR BLD AUTO: 10.4 % (ref 5–12)
NEUTROPHILS NFR BLD AUTO: 3.26 10*3/MM3 (ref 1.7–7)
NEUTROPHILS NFR BLD AUTO: 56.3 % (ref 42.7–76)
NITRITE UR QL STRIP: NEGATIVE
NRBC BLD AUTO-RTO: 0 /100 WBC (ref 0–0.2)
PH UR STRIP.AUTO: 6.5 [PH] (ref 5–8)
PHOSPHATE SERPL-MCNC: 3.9 MG/DL (ref 2.5–4.5)
PLATELET # BLD AUTO: 159 10*3/MM3 (ref 140–450)
PMV BLD AUTO: 9.6 FL (ref 6–12)
POTASSIUM SERPL-SCNC: 5.1 MMOL/L (ref 3.5–5.2)
PROT ?TM UR-MCNC: 17 MG/DL
PROT SERPL-MCNC: 6.3 G/DL (ref 6–8.5)
PROT UR QL STRIP: ABNORMAL
PROT/CREAT UR: 0.13 MG/G{CREAT}
PTH-INTACT SERPL-MCNC: 74.6 PG/ML (ref 15–65)
RBC # BLD AUTO: 4.66 10*6/MM3 (ref 4.14–5.8)
RBC # UR STRIP: NORMAL /HPF
REF LAB TEST METHOD: NORMAL
SODIUM SERPL-SCNC: 136 MMOL/L (ref 136–145)
SP GR UR STRIP: 1.02 (ref 1–1.03)
SQUAMOUS #/AREA URNS HPF: NORMAL /HPF
UROBILINOGEN UR QL STRIP: ABNORMAL
VIT B12 BLD-MCNC: 608 PG/ML (ref 211–946)
WBC # UR STRIP: NORMAL /HPF
WBC NRBC COR # BLD AUTO: 5.78 10*3/MM3 (ref 3.4–10.8)

## 2024-08-05 PROCEDURE — 83655 ASSAY OF LEAD: CPT

## 2024-08-05 PROCEDURE — 82570 ASSAY OF URINE CREATININE: CPT

## 2024-08-05 PROCEDURE — 82306 VITAMIN D 25 HYDROXY: CPT

## 2024-08-05 PROCEDURE — 83036 HEMOGLOBIN GLYCOSYLATED A1C: CPT

## 2024-08-05 PROCEDURE — 82175 ASSAY OF ARSENIC: CPT

## 2024-08-05 PROCEDURE — 81001 URINALYSIS AUTO W/SCOPE: CPT

## 2024-08-05 PROCEDURE — 83825 ASSAY OF MERCURY: CPT

## 2024-08-05 PROCEDURE — 85025 COMPLETE CBC W/AUTO DIFF WBC: CPT

## 2024-08-05 PROCEDURE — 85652 RBC SED RATE AUTOMATED: CPT

## 2024-08-05 PROCEDURE — 36415 COLL VENOUS BLD VENIPUNCTURE: CPT

## 2024-08-05 PROCEDURE — 80053 COMPREHEN METABOLIC PANEL: CPT

## 2024-08-05 PROCEDURE — 84156 ASSAY OF PROTEIN URINE: CPT

## 2024-08-05 PROCEDURE — 82607 VITAMIN B-12: CPT

## 2024-08-05 PROCEDURE — 83970 ASSAY OF PARATHORMONE: CPT

## 2024-08-05 PROCEDURE — 84100 ASSAY OF PHOSPHORUS: CPT

## 2024-08-06 ENCOUNTER — OFFICE VISIT (OUTPATIENT)
Dept: CARDIAC REHAB | Facility: HOSPITAL | Age: 84
End: 2024-08-06

## 2024-08-06 VITALS
SYSTOLIC BLOOD PRESSURE: 124 MMHG | WEIGHT: 237.44 LBS | DIASTOLIC BLOOD PRESSURE: 80 MMHG | BODY MASS INDEX: 33.13 KG/M2 | HEART RATE: 65 BPM | OXYGEN SATURATION: 97 %

## 2024-08-06 DIAGNOSIS — Z95.5 HISTORY OF HEART ARTERY STENT: Primary | ICD-10-CM

## 2024-08-08 ENCOUNTER — OFFICE VISIT (OUTPATIENT)
Dept: CARDIAC REHAB | Facility: HOSPITAL | Age: 84
End: 2024-08-08

## 2024-08-08 VITALS
WEIGHT: 236.99 LBS | SYSTOLIC BLOOD PRESSURE: 120 MMHG | DIASTOLIC BLOOD PRESSURE: 76 MMHG | BODY MASS INDEX: 33.07 KG/M2 | HEART RATE: 65 BPM | OXYGEN SATURATION: 97 %

## 2024-08-08 DIAGNOSIS — Z95.5 HISTORY OF HEART ARTERY STENT: Primary | ICD-10-CM

## 2024-08-09 LAB
ARSENIC BLD-MCNC: 2 UG/L (ref 0–9)
LEAD BLDV-MCNC: 1.3 UG/DL (ref 0–3.4)
MERCURY BLD-MCNC: <1 UG/L (ref 0–14.9)

## 2024-08-15 ENCOUNTER — OFFICE VISIT (OUTPATIENT)
Dept: CARDIAC REHAB | Facility: HOSPITAL | Age: 84
End: 2024-08-15

## 2024-08-15 VITALS
HEART RATE: 64 BPM | DIASTOLIC BLOOD PRESSURE: 68 MMHG | SYSTOLIC BLOOD PRESSURE: 116 MMHG | BODY MASS INDEX: 33.41 KG/M2 | OXYGEN SATURATION: 97 % | WEIGHT: 239.42 LBS

## 2024-08-15 DIAGNOSIS — Z95.5 HISTORY OF HEART ARTERY STENT: Primary | ICD-10-CM

## 2024-08-20 ENCOUNTER — OFFICE VISIT (OUTPATIENT)
Dept: CARDIAC REHAB | Facility: HOSPITAL | Age: 84
End: 2024-08-20

## 2024-08-20 VITALS
OXYGEN SATURATION: 97 % | SYSTOLIC BLOOD PRESSURE: 108 MMHG | WEIGHT: 235.01 LBS | HEART RATE: 61 BPM | BODY MASS INDEX: 32.79 KG/M2 | DIASTOLIC BLOOD PRESSURE: 60 MMHG

## 2024-08-20 DIAGNOSIS — Z95.5 HISTORY OF HEART ARTERY STENT: Primary | ICD-10-CM

## 2024-08-22 ENCOUNTER — OFFICE VISIT (OUTPATIENT)
Dept: CARDIAC REHAB | Facility: HOSPITAL | Age: 84
End: 2024-08-22

## 2024-08-22 VITALS
WEIGHT: 236.11 LBS | HEART RATE: 65 BPM | BODY MASS INDEX: 32.95 KG/M2 | SYSTOLIC BLOOD PRESSURE: 108 MMHG | DIASTOLIC BLOOD PRESSURE: 62 MMHG | OXYGEN SATURATION: 95 %

## 2024-08-22 DIAGNOSIS — Z95.5 HISTORY OF HEART ARTERY STENT: Primary | ICD-10-CM

## 2024-08-26 ENCOUNTER — TELEPHONE (OUTPATIENT)
Dept: INTERNAL MEDICINE | Age: 84
End: 2024-08-26

## 2024-08-27 ENCOUNTER — OFFICE VISIT (OUTPATIENT)
Dept: CARDIAC REHAB | Facility: HOSPITAL | Age: 84
End: 2024-08-27

## 2024-08-27 ENCOUNTER — OFFICE VISIT (OUTPATIENT)
Dept: INTERNAL MEDICINE | Age: 84
End: 2024-08-27
Payer: MEDICARE

## 2024-08-27 VITALS
DIASTOLIC BLOOD PRESSURE: 82 MMHG | WEIGHT: 234.79 LBS | HEART RATE: 66 BPM | SYSTOLIC BLOOD PRESSURE: 122 MMHG | BODY MASS INDEX: 32.76 KG/M2 | OXYGEN SATURATION: 97 %

## 2024-08-27 VITALS
DIASTOLIC BLOOD PRESSURE: 82 MMHG | HEIGHT: 71 IN | BODY MASS INDEX: 33.04 KG/M2 | WEIGHT: 236 LBS | HEART RATE: 71 BPM | SYSTOLIC BLOOD PRESSURE: 147 MMHG | OXYGEN SATURATION: 93 % | TEMPERATURE: 98.2 F

## 2024-08-27 DIAGNOSIS — J45.901 MODERATE ASTHMA WITH ACUTE EXACERBATION, UNSPECIFIED WHETHER PERSISTENT: ICD-10-CM

## 2024-08-27 DIAGNOSIS — Z95.5 HISTORY OF HEART ARTERY STENT: Primary | ICD-10-CM

## 2024-08-27 DIAGNOSIS — J06.9 ACUTE URI: Primary | ICD-10-CM

## 2024-08-27 LAB
EXPIRATION DATE: NORMAL
FLUAV AG UPPER RESP QL IA.RAPID: NOT DETECTED
FLUBV AG UPPER RESP QL IA.RAPID: NOT DETECTED
INTERNAL CONTROL: NORMAL
Lab: NORMAL
SARS-COV-2 AG UPPER RESP QL IA.RAPID: NOT DETECTED

## 2024-08-27 PROCEDURE — 3079F DIAST BP 80-89 MM HG: CPT | Performed by: INTERNAL MEDICINE

## 2024-08-27 PROCEDURE — 87428 SARSCOV & INF VIR A&B AG IA: CPT | Performed by: INTERNAL MEDICINE

## 2024-08-27 PROCEDURE — 1126F AMNT PAIN NOTED NONE PRSNT: CPT | Performed by: INTERNAL MEDICINE

## 2024-08-27 PROCEDURE — 99213 OFFICE O/P EST LOW 20 MIN: CPT | Performed by: INTERNAL MEDICINE

## 2024-08-27 PROCEDURE — 3077F SYST BP >= 140 MM HG: CPT | Performed by: INTERNAL MEDICINE

## 2024-08-27 RX ORDER — CEFDINIR 300 MG/1
300 CAPSULE ORAL 2 TIMES DAILY
Qty: 20 CAPSULE | Refills: 0 | Status: SHIPPED | OUTPATIENT
Start: 2024-08-27

## 2024-08-27 RX ORDER — PREDNISONE 50 MG/1
50 TABLET ORAL DAILY
Qty: 5 TABLET | Refills: 0 | Status: SHIPPED | OUTPATIENT
Start: 2024-08-27

## 2024-08-27 NOTE — PROGRESS NOTES
Improved   Likely secondary to acute  COPD exacerbation likely secondary to influenza a infection  Continue Tamiflu  Continue home medication  Discharged home Tolerated exercise well.

## 2024-08-27 NOTE — PROGRESS NOTES
"CHIEF COMPLAINT/ HPI:  Cough (Cough and congestion for several weeks, soa , wheezing and a slight rattle at night time. Taking only mucinex. )              Objective   Vital Signs  Vitals:    08/27/24 1523   BP: 147/82   Pulse: 71   Temp: 98.2 °F (36.8 °C)   SpO2: 93%   Weight: 107 kg (236 lb)   Height: 180.3 cm (70.98\")      Body mass index is 32.93 kg/m².  Review of Systems   Constitutional:  Negative for fever.   HENT:  Positive for congestion and postnasal drip.    Respiratory:  Positive for cough.       Physical Exam patient's neck supple throat is clear moist, cardiac exam regular rhythm alert and oriented x 3 lungs show a few expiratory wheeze coarse wheeze on the posterior base, and midlung field on the left, otherwise clear,  Result Review :   Lab Results   Component Value Date    PROBNP 30.7 09/14/2021    PROBNP 17.7 06/23/2021     CMP          2/1/2024    07:18 3/20/2024    12:34 8/5/2024    06:58   CMP   Glucose 115  112  130    BUN 28  33  32    Creatinine 1.75  1.86  1.92    EGFR 38.2  35.5  33.9    Sodium 136  136  136    Potassium 5.2  4.6  5.1    Chloride 102  101  99    Calcium 9.1  9.5  9.0    Total Protein  6.5  6.3    Albumin 4.1  4.4  4.0    Globulin  2.1  2.3    Total Bilirubin  0.5  0.4    Alkaline Phosphatase  65  69    AST (SGOT)  16  14    ALT (SGPT)  17  13    Albumin/Globulin Ratio  2.1  1.7    BUN/Creatinine Ratio 16.0  17.7  16.7    Anion Gap 8.6  10.0  9.8      CBC w/diff          2/1/2024    07:18 3/20/2024    12:34 8/5/2024    06:58   CBC w/Diff   WBC 5.92  6.03  5.78    RBC 4.38  4.36  4.66    Hemoglobin 13.6  13.7  14.6    Hematocrit 40.1  40.0  43.6    MCV 91.6  91.7  93.6    MCH 31.1  31.4  31.3    MCHC 33.9  34.3  33.5    RDW 12.7  12.9  13.0    Platelets 157  138  159    Neutrophil Rel % 54.8  56.3  56.3    Immature Granulocyte Rel % 0.5  0.3  0.2    Lymphocyte Rel % 26.5  27.5  27.9    Monocyte Rel % 11.3  10.0  10.4    Eosinophil Rel % 6.1  5.1  4.7    Basophil Rel % 0.8  " 0.8  0.5       Lipid Panel          3/20/2024    12:34   Lipid Panel   Total Cholesterol 134    Triglycerides 329    HDL Cholesterol 38    VLDL Cholesterol 50    LDL Cholesterol  46    LDL/HDL Ratio 0.79       Lab Results   Component Value Date    TSH 2.110 03/20/2024    TSH 3.140 02/07/2023    TSH 3.150 08/15/2022      Lab Results   Component Value Date    FREET4 1.32 03/20/2024    FREET4 1.26 02/07/2023    FREET4 1.36 08/15/2022      A1C Last 3 Results          3/20/2024    12:34 8/5/2024    06:58   HGBA1C Last 3 Results   Hemoglobin A1C 6.10  6.00       PSA          3/20/2024    12:34   PSA   PSA 0.105                     Visit Diagnoses:    ICD-10-CM ICD-9-CM   1. Acute URI  J06.9 465.9   2. Moderate asthma with acute exacerbation, unspecified whether persistent  J45.901 493.92       Assessment and Plan   Diagnoses and all orders for this visit:    1. Acute URI (Primary)    2. Moderate asthma with acute exacerbation, unspecified whether persistent    Other orders  -     cefdinir (OMNICEF) 300 MG capsule; Take 1 capsule by mouth 2 (Two) Times a Day.  Dispense: 20 capsule; Refill: 0  -     predniSONE (DELTASONE) 50 MG tablet; Take 1 tablet by mouth Daily.  Dispense: 5 tablet; Refill: 0    Acute upper respiratory infection with some audible wheezing, will treat with antibiotic Omnicef, possible steroid, patient can use his inhaler albuterol and DayQuil or NyQuil at night and during the day as needed, August 27, 2024== COVID testing was negative today flu test was negative    Metallic taste in his mouth, issue resolved, August 2024     Right flank pain,abd pain ---- previous CT scan December 15, 2022 showed mild diverticulosis of the descending colon and sigmoid colon no evidence of infection small inguinal hernias containing fat are stable --- chest x-ray, right rib series, showed no active disease November 30, 2022,     Abdominal pains, right flank pain, ---CT scan unremarkable February 2022,, and as above,  December 15, 2022    Impaired fasting glucose blood sugar 117 counseled on diet walking low-carb diet March 25, 2024, hemoglobin A1c 6.1 up slightly March 2024, started on Jardiance 10 mg daily by Dr. hernández    CKD stage IIIa, creatinine 1.4-1.8 -seeing dr hernández since August 2022    Coronary artery disease ---previous stent placement 2007, continues aspirin,    Insomnia, continues  Elavil 75 mg tablet ---half tablet at bedtime,    Hypertension continues  Norvasc 10 mg daily, irbesartan 300 mg daily    Lower extremity edema ---continues Lasix 20 mg daily, spironolactone 25 mg, half a tablet daily    Vitamin D and B12 deficiency continues replacement    Hyperlipidemia continues Lipitor 80 mg daily, zetia 10 mg qd , Vascepa 1 g 2 tablets bid    Hypothyroidism continues  levothyroxine 50 mcg daily    Paroxysmal atrial fibrillation, continues  sotalol 40 mg twice a day--dr martinez     Mild thrombocytopenia, chronic,     Prostate Ca==, dr olivarez prostate CA, seed implants, 2012, PSA equals 0.1, March 2024    Overweight, discussed weight loss efforts,                 Follow Up   No follow-ups on file.  Patient was given instructions and counseling regarding his condition or for health maintenance advice. Please see specific information pulled into the AVS if appropriate.

## 2024-08-29 ENCOUNTER — OFFICE VISIT (OUTPATIENT)
Dept: CARDIAC REHAB | Facility: HOSPITAL | Age: 84
End: 2024-08-29

## 2024-08-29 VITALS
BODY MASS INDEX: 32.82 KG/M2 | HEART RATE: 65 BPM | DIASTOLIC BLOOD PRESSURE: 70 MMHG | OXYGEN SATURATION: 93 % | SYSTOLIC BLOOD PRESSURE: 110 MMHG | WEIGHT: 235.23 LBS

## 2024-08-29 DIAGNOSIS — Z95.5 HISTORY OF HEART ARTERY STENT: Primary | ICD-10-CM

## 2024-09-05 ENCOUNTER — OFFICE VISIT (OUTPATIENT)
Dept: CARDIAC REHAB | Facility: HOSPITAL | Age: 84
End: 2024-09-05

## 2024-09-05 VITALS
DIASTOLIC BLOOD PRESSURE: 72 MMHG | OXYGEN SATURATION: 97 % | WEIGHT: 237.44 LBS | BODY MASS INDEX: 33.13 KG/M2 | HEART RATE: 57 BPM | SYSTOLIC BLOOD PRESSURE: 124 MMHG

## 2024-09-05 DIAGNOSIS — Z95.5 HISTORY OF HEART ARTERY STENT: Primary | ICD-10-CM

## 2024-09-10 ENCOUNTER — OFFICE VISIT (OUTPATIENT)
Dept: CARDIAC REHAB | Facility: HOSPITAL | Age: 84
End: 2024-09-10

## 2024-09-10 VITALS
BODY MASS INDEX: 32.76 KG/M2 | HEART RATE: 67 BPM | SYSTOLIC BLOOD PRESSURE: 110 MMHG | DIASTOLIC BLOOD PRESSURE: 62 MMHG | WEIGHT: 234.79 LBS | OXYGEN SATURATION: 97 %

## 2024-09-10 DIAGNOSIS — Z95.5 HISTORY OF HEART ARTERY STENT: Primary | ICD-10-CM

## 2024-09-17 ENCOUNTER — OFFICE VISIT (OUTPATIENT)
Dept: CARDIAC REHAB | Facility: HOSPITAL | Age: 84
End: 2024-09-17

## 2024-09-17 VITALS
DIASTOLIC BLOOD PRESSURE: 66 MMHG | OXYGEN SATURATION: 97 % | BODY MASS INDEX: 32.64 KG/M2 | SYSTOLIC BLOOD PRESSURE: 112 MMHG | HEART RATE: 67 BPM | WEIGHT: 233.91 LBS

## 2024-09-17 DIAGNOSIS — Z95.5 HISTORY OF HEART ARTERY STENT: Primary | ICD-10-CM

## 2024-09-19 ENCOUNTER — OFFICE VISIT (OUTPATIENT)
Dept: CARDIAC REHAB | Facility: HOSPITAL | Age: 84
End: 2024-09-19

## 2024-09-19 VITALS
SYSTOLIC BLOOD PRESSURE: 132 MMHG | WEIGHT: 234.57 LBS | DIASTOLIC BLOOD PRESSURE: 70 MMHG | BODY MASS INDEX: 32.73 KG/M2 | OXYGEN SATURATION: 94 % | HEART RATE: 66 BPM

## 2024-09-19 DIAGNOSIS — Z95.5 HISTORY OF HEART ARTERY STENT: Primary | ICD-10-CM

## 2024-09-23 ENCOUNTER — OFFICE VISIT (OUTPATIENT)
Dept: CARDIOLOGY | Facility: CLINIC | Age: 84
End: 2024-09-23
Payer: MEDICARE

## 2024-09-23 ENCOUNTER — LAB (OUTPATIENT)
Dept: LAB | Facility: HOSPITAL | Age: 84
End: 2024-09-23
Payer: MEDICARE

## 2024-09-23 VITALS
DIASTOLIC BLOOD PRESSURE: 71 MMHG | SYSTOLIC BLOOD PRESSURE: 118 MMHG | BODY MASS INDEX: 33.21 KG/M2 | HEART RATE: 60 BPM | HEIGHT: 70 IN | WEIGHT: 232 LBS

## 2024-09-23 DIAGNOSIS — E78.2 MIXED HYPERLIPIDEMIA: ICD-10-CM

## 2024-09-23 DIAGNOSIS — I48.0 PAROXYSMAL ATRIAL FIBRILLATION: ICD-10-CM

## 2024-09-23 DIAGNOSIS — K21.9 GASTROESOPHAGEAL REFLUX DISEASE WITHOUT ESOPHAGITIS: ICD-10-CM

## 2024-09-23 DIAGNOSIS — E55.9 VITAMIN D DEFICIENCY: ICD-10-CM

## 2024-09-23 DIAGNOSIS — I25.10 CORONARY ARTERY DISEASE INVOLVING NATIVE CORONARY ARTERY OF NATIVE HEART WITHOUT ANGINA PECTORIS: Primary | ICD-10-CM

## 2024-09-23 DIAGNOSIS — E53.8 VITAMIN B 12 DEFICIENCY: ICD-10-CM

## 2024-09-23 DIAGNOSIS — I25.10 CORONARY ARTERY DISEASE INVOLVING NATIVE CORONARY ARTERY OF NATIVE HEART WITHOUT ANGINA PECTORIS: ICD-10-CM

## 2024-09-23 DIAGNOSIS — I67.1 BRAIN ANEURYSM: ICD-10-CM

## 2024-09-23 DIAGNOSIS — R43.2 TASTE SENSE ALTERED: ICD-10-CM

## 2024-09-23 DIAGNOSIS — N18.32 STAGE 3B CHRONIC KIDNEY DISEASE: ICD-10-CM

## 2024-09-23 DIAGNOSIS — I10 HYPERTENSION, ESSENTIAL: ICD-10-CM

## 2024-09-23 DIAGNOSIS — E03.9 HYPOTHYROIDISM, UNSPECIFIED TYPE: ICD-10-CM

## 2024-09-23 DIAGNOSIS — R73.01 IFG (IMPAIRED FASTING GLUCOSE): ICD-10-CM

## 2024-09-23 DIAGNOSIS — Z85.46 HISTORY OF PROSTATE CANCER: ICD-10-CM

## 2024-09-23 LAB
ALBUMIN SERPL-MCNC: 3.9 G/DL (ref 3.5–5.2)
ALBUMIN/GLOB SERPL: 2 G/DL
ALP SERPL-CCNC: 68 U/L (ref 39–117)
ALT SERPL W P-5'-P-CCNC: 14 U/L (ref 1–41)
ANION GAP SERPL CALCULATED.3IONS-SCNC: 7.6 MMOL/L (ref 5–15)
AST SERPL-CCNC: 18 U/L (ref 1–40)
BASOPHILS # BLD AUTO: 0.04 10*3/MM3 (ref 0–0.2)
BASOPHILS NFR BLD AUTO: 0.9 % (ref 0–1.5)
BILIRUB SERPL-MCNC: 0.4 MG/DL (ref 0–1.2)
BUN SERPL-MCNC: 23 MG/DL (ref 8–23)
BUN/CREAT SERPL: 14.3 (ref 7–25)
CALCIUM SPEC-SCNC: 9.2 MG/DL (ref 8.6–10.5)
CHLORIDE SERPL-SCNC: 104 MMOL/L (ref 98–107)
CHOLEST SERPL-MCNC: 113 MG/DL (ref 0–200)
CO2 SERPL-SCNC: 26.4 MMOL/L (ref 22–29)
CREAT SERPL-MCNC: 1.61 MG/DL (ref 0.76–1.27)
DEPRECATED RDW RBC AUTO: 47.6 FL (ref 37–54)
EGFRCR SERPLBLD CKD-EPI 2021: 41.9 ML/MIN/1.73
EOSINOPHIL # BLD AUTO: 0.33 10*3/MM3 (ref 0–0.4)
EOSINOPHIL NFR BLD AUTO: 7.6 % (ref 0.3–6.2)
ERYTHROCYTE [DISTWIDTH] IN BLOOD BY AUTOMATED COUNT: 13.7 % (ref 12.3–15.4)
FOLATE SERPL-MCNC: 5.99 NG/ML (ref 4.78–24.2)
GLOBULIN UR ELPH-MCNC: 2 GM/DL
GLUCOSE SERPL-MCNC: 100 MG/DL (ref 65–99)
HBA1C MFR BLD: 5.7 % (ref 4.8–5.6)
HCT VFR BLD AUTO: 43.3 % (ref 37.5–51)
HDLC SERPL-MCNC: 31 MG/DL (ref 40–60)
HGB BLD-MCNC: 13.8 G/DL (ref 13–17.7)
IMM GRANULOCYTES # BLD AUTO: 0.01 10*3/MM3 (ref 0–0.05)
IMM GRANULOCYTES NFR BLD AUTO: 0.2 % (ref 0–0.5)
LDLC SERPL CALC-MCNC: 56 MG/DL (ref 0–100)
LDLC/HDLC SERPL: 1.66 {RATIO}
LYMPHOCYTES # BLD AUTO: 1.45 10*3/MM3 (ref 0.7–3.1)
LYMPHOCYTES NFR BLD AUTO: 33.5 % (ref 19.6–45.3)
MCH RBC QN AUTO: 30.1 PG (ref 26.6–33)
MCHC RBC AUTO-ENTMCNC: 31.9 G/DL (ref 31.5–35.7)
MCV RBC AUTO: 94.3 FL (ref 79–97)
MONOCYTES # BLD AUTO: 0.48 10*3/MM3 (ref 0.1–0.9)
MONOCYTES NFR BLD AUTO: 11.1 % (ref 5–12)
NEUTROPHILS NFR BLD AUTO: 2.02 10*3/MM3 (ref 1.7–7)
NEUTROPHILS NFR BLD AUTO: 46.7 % (ref 42.7–76)
NRBC BLD AUTO-RTO: 0 /100 WBC (ref 0–0.2)
PLATELET # BLD AUTO: 121 10*3/MM3 (ref 140–450)
PMV BLD AUTO: 10.7 FL (ref 6–12)
POTASSIUM SERPL-SCNC: 4.9 MMOL/L (ref 3.5–5.2)
PROT SERPL-MCNC: 5.9 G/DL (ref 6–8.5)
PSA SERPL-MCNC: 0.08 NG/ML (ref 0–4)
RBC # BLD AUTO: 4.59 10*6/MM3 (ref 4.14–5.8)
SODIUM SERPL-SCNC: 138 MMOL/L (ref 136–145)
T4 FREE SERPL-MCNC: 1.25 NG/DL (ref 0.92–1.68)
TRIGL SERPL-MCNC: 152 MG/DL (ref 0–150)
TSH SERPL DL<=0.05 MIU/L-ACNC: 2.77 UIU/ML (ref 0.27–4.2)
VLDLC SERPL-MCNC: 26 MG/DL (ref 5–40)
WBC NRBC COR # BLD AUTO: 4.33 10*3/MM3 (ref 3.4–10.8)

## 2024-09-23 PROCEDURE — 84439 ASSAY OF FREE THYROXINE: CPT

## 2024-09-23 PROCEDURE — 80053 COMPREHEN METABOLIC PANEL: CPT

## 2024-09-23 PROCEDURE — 84443 ASSAY THYROID STIM HORMONE: CPT

## 2024-09-23 PROCEDURE — 84153 ASSAY OF PSA TOTAL: CPT

## 2024-09-23 PROCEDURE — 85025 COMPLETE CBC W/AUTO DIFF WBC: CPT

## 2024-09-23 PROCEDURE — 1160F RVW MEDS BY RX/DR IN RCRD: CPT | Performed by: INTERNAL MEDICINE

## 2024-09-23 PROCEDURE — 99214 OFFICE O/P EST MOD 30 MIN: CPT | Performed by: INTERNAL MEDICINE

## 2024-09-23 PROCEDURE — 80061 LIPID PANEL: CPT

## 2024-09-23 PROCEDURE — 36415 COLL VENOUS BLD VENIPUNCTURE: CPT

## 2024-09-23 PROCEDURE — 83036 HEMOGLOBIN GLYCOSYLATED A1C: CPT

## 2024-09-23 PROCEDURE — 3074F SYST BP LT 130 MM HG: CPT | Performed by: INTERNAL MEDICINE

## 2024-09-23 PROCEDURE — 82746 ASSAY OF FOLIC ACID SERUM: CPT

## 2024-09-23 PROCEDURE — 1159F MED LIST DOCD IN RCRD: CPT | Performed by: INTERNAL MEDICINE

## 2024-09-23 PROCEDURE — 3078F DIAST BP <80 MM HG: CPT | Performed by: INTERNAL MEDICINE

## 2024-09-26 ENCOUNTER — OFFICE VISIT (OUTPATIENT)
Dept: CARDIAC REHAB | Facility: HOSPITAL | Age: 84
End: 2024-09-26

## 2024-09-26 DIAGNOSIS — Z95.5 HISTORY OF HEART ARTERY STENT: Primary | ICD-10-CM

## 2024-10-01 ENCOUNTER — OFFICE VISIT (OUTPATIENT)
Dept: CARDIAC REHAB | Facility: HOSPITAL | Age: 84
End: 2024-10-01

## 2024-10-01 VITALS
BODY MASS INDEX: 33.66 KG/M2 | HEART RATE: 78 BPM | SYSTOLIC BLOOD PRESSURE: 94 MMHG | OXYGEN SATURATION: 100 % | WEIGHT: 234.57 LBS | DIASTOLIC BLOOD PRESSURE: 68 MMHG

## 2024-10-01 DIAGNOSIS — Z95.5 HISTORY OF HEART ARTERY STENT: Primary | ICD-10-CM

## 2024-10-03 ENCOUNTER — OFFICE VISIT (OUTPATIENT)
Dept: CARDIAC REHAB | Facility: HOSPITAL | Age: 84
End: 2024-10-03

## 2024-10-03 VITALS
SYSTOLIC BLOOD PRESSURE: 110 MMHG | WEIGHT: 235.89 LBS | DIASTOLIC BLOOD PRESSURE: 62 MMHG | BODY MASS INDEX: 33.85 KG/M2 | OXYGEN SATURATION: 94 % | HEART RATE: 67 BPM

## 2024-10-03 DIAGNOSIS — Z95.5 HISTORY OF HEART ARTERY STENT: Primary | ICD-10-CM

## 2024-10-07 ENCOUNTER — OFFICE VISIT (OUTPATIENT)
Dept: INTERNAL MEDICINE | Age: 84
End: 2024-10-07
Payer: MEDICARE

## 2024-10-07 VITALS
WEIGHT: 239 LBS | SYSTOLIC BLOOD PRESSURE: 137 MMHG | DIASTOLIC BLOOD PRESSURE: 74 MMHG | HEIGHT: 70 IN | TEMPERATURE: 98.2 F | BODY MASS INDEX: 34.22 KG/M2 | HEART RATE: 90 BPM

## 2024-10-07 DIAGNOSIS — I10 HYPERTENSION, ESSENTIAL: ICD-10-CM

## 2024-10-07 DIAGNOSIS — I25.10 CORONARY ARTERY DISEASE INVOLVING NATIVE CORONARY ARTERY OF NATIVE HEART WITHOUT ANGINA PECTORIS: ICD-10-CM

## 2024-10-07 DIAGNOSIS — E78.2 MIXED HYPERLIPIDEMIA: ICD-10-CM

## 2024-10-07 DIAGNOSIS — Z12.11 COLON CANCER SCREENING: ICD-10-CM

## 2024-10-07 DIAGNOSIS — I48.0 PAROXYSMAL ATRIAL FIBRILLATION: ICD-10-CM

## 2024-10-07 DIAGNOSIS — Z85.46 HISTORY OF PROSTATE CANCER: ICD-10-CM

## 2024-10-07 DIAGNOSIS — E55.9 VITAMIN D DEFICIENCY: ICD-10-CM

## 2024-10-07 DIAGNOSIS — E06.3 HYPOTHYROIDISM DUE TO HASHIMOTO THYROIDITIS: ICD-10-CM

## 2024-10-07 DIAGNOSIS — Z12.5 SCREENING PSA (PROSTATE SPECIFIC ANTIGEN): ICD-10-CM

## 2024-10-07 DIAGNOSIS — R73.01 IFG (IMPAIRED FASTING GLUCOSE): ICD-10-CM

## 2024-10-07 DIAGNOSIS — Z00.00 MEDICARE ANNUAL WELLNESS VISIT, SUBSEQUENT: Primary | ICD-10-CM

## 2024-10-07 DIAGNOSIS — E53.8 VITAMIN B 12 DEFICIENCY: ICD-10-CM

## 2024-10-07 PROCEDURE — G0439 PPPS, SUBSEQ VISIT: HCPCS | Performed by: INTERNAL MEDICINE

## 2024-10-07 PROCEDURE — 99214 OFFICE O/P EST MOD 30 MIN: CPT | Performed by: INTERNAL MEDICINE

## 2024-10-07 PROCEDURE — 96160 PT-FOCUSED HLTH RISK ASSMT: CPT | Performed by: INTERNAL MEDICINE

## 2024-10-07 PROCEDURE — 3075F SYST BP GE 130 - 139MM HG: CPT | Performed by: INTERNAL MEDICINE

## 2024-10-07 PROCEDURE — 3078F DIAST BP <80 MM HG: CPT | Performed by: INTERNAL MEDICINE

## 2024-10-07 PROCEDURE — 1126F AMNT PAIN NOTED NONE PRSNT: CPT | Performed by: INTERNAL MEDICINE

## 2024-10-07 PROCEDURE — 1170F FXNL STATUS ASSESSED: CPT | Performed by: INTERNAL MEDICINE

## 2024-10-07 NOTE — PROGRESS NOTES
Subjective   The ABCs of the Annual Wellness Visit  Medicare Wellness Visit      Michael Sol is a 84 y.o. patient who presents for a Medicare Wellness Visit.    The following portions of the patient's history were reviewed and   updated as appropriate: allergies, current medications, past family history, past medical history, past social history, past surgical history, and problem list.    Compared to one year ago, the patient's physical   health is worse.  Compared to one year ago, the patient's mental   health is the same.    Recent Hospitalizations:  He was not admitted to the hospital during the last year.     Current Medical Providers:  Patient Care Team:  Pablo Salazar MD as PCP - General (Internal Medicine)    Outpatient Medications Prior to Visit   Medication Sig Dispense Refill    amitriptyline (ELAVIL) 75 MG tablet Take 0.5 tablets by mouth Every Night.      amLODIPine (NORVASC) 10 MG tablet Take 1 tablet by mouth Daily.      apixaban (ELIQUIS) 2.5 MG tablet tablet Take 1 tablet by mouth 2 (Two) Times a Day.      cholecalciferol (VITAMIN D3) 25 MCG (1000 UT) tablet Take 1 tablet by mouth Daily.      cyanocobalamin 1000 MCG/ML injection Inject 1 mL into the appropriate muscle as directed by prescriber Every 28 (Twenty-Eight) Days.      empagliflozin (JARDIANCE) 10 MG tablet tablet Take 1 tablet by mouth Daily.      ezetimibe (ZETIA) 10 MG tablet Take 1 tablet by mouth Daily. 90 tablet 3    furosemide (LASIX) 20 MG tablet Take 1 tablet by mouth Daily. 1 tab six days a week. 90 tablet 3    irbesartan (AVAPRO) 300 MG tablet TAKE 1 TABLET BY MOUTH EVERY NIGHT 90 tablet 0    levothyroxine (SYNTHROID, LEVOTHROID) 50 MCG tablet Take 1 tablet by mouth Daily.      sotalol (BETAPACE) 80 MG tablet Take 0.5 tablets by mouth 2 (Two) Times a Day.      traMADol (ULTRAM) 50 MG tablet Take 1 tablet by mouth Every 6 (Six) Hours As Needed for Moderate Pain. 30 tablet 0    Vascepa 1 g capsule capsule Take 2  g by mouth Daily.      spironolactone (ALDACTONE) 25 MG tablet Take 1 tablet by mouth.      cefdinir (OMNICEF) 300 MG capsule Take 1 capsule by mouth 2 (Two) Times a Day. (Patient not taking: Reported on 10/7/2024) 20 capsule 0    pantoprazole (Protonix) 40 MG EC tablet Take 1 tablet by mouth Daily. (Patient not taking: Reported on 9/23/2024) 60 tablet 3    predniSONE (DELTASONE) 50 MG tablet Take 1 tablet by mouth Daily. (Patient not taking: Reported on 9/23/2024) 5 tablet 0     No facility-administered medications prior to visit.     Opioid medication/s are on active medication list.  and I have evaluated his active treatment plan and pain score trends (see table).  Vitals:    10/07/24 0843   PainSc: 0-No pain     I have reviewed the chart for potential of high risk medication and harmful drug interactions in the elderly.        Aspirin is not on active medication list.  Aspirin use is contraindicated for this patient due to: current use of Eliquis.  .    Patient Active Problem List   Diagnosis    Hypertension, essential    Mixed hyperlipidemia    Hypothyroidism    History of prostate cancer    Diverticulosis    Esophageal reflux    Colon polyps    Coronary artery disease involving native coronary artery of native heart without angina pectoris    Brain aneurysm    Blepharitis    Atrial fibrillation    Vitamin B 12 deficiency    Stage 3b chronic kidney disease    Urgency incontinence    Abdominal pain, generalized    Vitamin D deficiency    Right upper quadrant abdominal pain    Rt flank pain    Intercostal pain    Medicare annual wellness visit, subsequent    Screening PSA     IFG    History of colon polyps    Colon cancer screening    Acute right-sided thoracic back pain    Cough in adult    Acute URI    Taste sense altered     Advance Care Planning Advance Directive is on file.  ACP discussion was held with the patient during this visit. Patient has an advance directive in EMR which is still valid.    "          Objective   Vitals:    10/07/24 0843   BP: 137/74   Pulse: 90   Temp: 98.2 °F (36.8 °C)   Weight: 108 kg (239 lb)   Height: 177.8 cm (70\")   PainSc: 0-No pain       Estimated body mass index is 34.29 kg/m² as calculated from the following:    Height as of this encounter: 177.8 cm (70\").    Weight as of this encounter: 108 kg (239 lb).    BMI is >= 30 and <35. (Class 1 Obesity). The following options were offered after discussion;: weight loss educational material (shared in after visit summary), exercise counseling/recommendations, and nutrition counseling/recommendations       Does the patient have evidence of cognitive impairment? No  Lab Results   Component Value Date    TRIG 152 (H) 2024    HDL 31 (L) 2024    LDL 56 2024    VLDL 26 2024    HGBA1C 5.70 (H) 2024                                                                                               Health  Risk Assessment    Smoking Status:  Social History     Tobacco Use   Smoking Status Former    Current packs/day: 0.00    Average packs/day: 1 pack/day for 20.0 years (20.0 ttl pk-yrs)    Types: Cigarettes    Start date:     Quit date:     Years since quittin.8    Passive exposure: Past   Smokeless Tobacco Never     Alcohol Consumption:  Social History     Substance and Sexual Activity   Alcohol Use Yes    Comment: occasionally       Fall Risk Screen  STEADI Fall Risk Assessment was completed, and patient is at LOW risk for falls.Assessment completed on:10/7/2024    Depression Screening:      10/7/2024     8:49 AM   PHQ-2/PHQ-9 Depression Screening   Little Interest or Pleasure in Doing Things 0-->not at all   Feeling Down, Depressed or Hopeless 0-->not at all   PHQ-9: Brief Depression Severity Measure Score 0     Health Habits and Functional and Cognitive Screening:      10/7/2024     8:00 AM   Functional & Cognitive Status   Do you have difficulty preparing food and eating? No   Do you have difficulty " bathing yourself, getting dressed or grooming yourself? No   Do you have difficulty using the toilet? No   Do you have difficulty moving around from place to place? No   Do you have trouble with steps or getting out of a bed or a chair? No   Current Diet Well Balanced Diet   Dental Exam Up to date   Eye Exam Up to date   Exercise (times per week) 6 times per week   Current Exercises Include Other;Yard Work   Do you need help using the phone?  No   Are you deaf or do you have serious difficulty hearing?  Yes   Do you need help to go to places out of walking distance? No   Do you need help shopping? No   Do you need help preparing meals?  No   Do you need help with housework?  No   Do you need help with laundry? No   Do you need help taking your medications? No   Do you need help managing money? No   Do you ever drive or ride in a car without wearing a seat belt? No   Have you felt unusual stress, anger or loneliness in the last month? No   Who do you live with? Spouse   If you need help, do you have trouble finding someone available to you? No   Have you been bothered in the last four weeks by sexual problems? No   Do you have difficulty concentrating, remembering or making decisions? No           Age-appropriate Screening Schedule:  Refer to the list below for future screening recommendations based on patient's age, sex and/or medical conditions. Orders for these recommended tests are listed in the plan section. The patient has been provided with a written plan.    Health Maintenance List  Health Maintenance   Topic Date Due    RSV Vaccine - Adults (1 - 1-dose 60+ series) Never done    Pneumococcal Vaccine 65+ (2 of 2 - PPSV23 or PCV20) 10/11/2017    BMI FOLLOWUP  08/28/2024    COVID-19 Vaccine (5 - 2023-24 season) 09/01/2024    LIPID PANEL  09/23/2025    ANNUAL WELLNESS VISIT  10/07/2025    TDAP/TD VACCINES (3 - Td or Tdap) 10/24/2027    COLORECTAL CANCER SCREENING  04/22/2029    INFLUENZA VACCINE  Completed     ZOSTER VACCINE  Completed                                                                                                                                                CMS Preventative Services Quick Reference  Risk Factors Identified During Encounter  None Identified    The above risks/problems have been discussed with the patient.  Pertinent information has been shared with the patient in the After Visit Summary.  An After Visit Summary and PPPS were made available to the patient.    Follow Up:   Next Medicare Wellness visit to be scheduled in 1 year.     Assessment & Plan  Medicare annual wellness visit, subsequent    Mixed hyperlipidemia   Lipid abnormalities are stable    Plan:  Continue same medication/s without change.      Discussed medication dosage, use, side effects, and goals of treatment in detail.    Counseled patient on lifestyle modifications to help control hyperlipidemia.     Patient Treatment Goals:   LDL goal is less than 70  LDL goal is less than 55    Followup in 6 months.  Coronary artery disease involving native coronary artery of native heart without angina pectoris  Coronary Artery Disease (OPTIONAL): Coronary artery disease is stable.  Continue current treatment regimen. Continue current medications.  Cardiac status will be reassessed in 6 months.  Hypertension, essential  Hypertension is stable and controlled  Continue current treatment regimen.  Blood pressure will be reassessed in 6 months.  IFG (impaired fasting glucose)    Vitamin B 12 deficiency    Screening PSA     Colon cancer screening    Vitamin D deficiency    Hypothyroidism due to Hashimoto thyroiditis    Paroxysmal atrial fibrillation    History of prostate cancer    Orders Placed This Encounter   Procedures    Comprehensive Metabolic Panel    PSA DIAGNOSTIC    Hemoglobin A1c    TSH+Free T4    MicroAlbumin, Urine, Random - Urine, Clean Catch    CBC & Differential             Follow Up:   Return in about 6 months (around  4/7/2025).

## 2024-10-07 NOTE — PROGRESS NOTES
"CHIEF COMPLAINT/ HPI:  Medicare Wellness-subsequent (Wellness visit, lab follow up. Pt states no concerns.) recent upper respiratory infection last month improved, says he is doing well overall, still follows up with nephrology, no chest pains,              Objective   Vital Signs  Vitals:    10/07/24 0843   BP: 137/74   Pulse: 90   Temp: 98.2 °F (36.8 °C)   Weight: 108 kg (239 lb)   Height: 177.8 cm (70\")      Body mass index is 34.29 kg/m².  Review of Systems   Constitutional: Negative.    HENT: Negative.     Eyes: Negative.    Respiratory: Negative.     Cardiovascular: Negative.    Gastrointestinal: Negative.    Endocrine: Negative.    Genitourinary: Negative.    Musculoskeletal: Negative.    Allergic/Immunologic: Negative.    Neurological: Negative.    Hematological: Negative.    Psychiatric/Behavioral: Negative.        Physical Exam  Constitutional:       General: He is not in acute distress.     Appearance: Normal appearance.   HENT:      Head: Normocephalic.      Mouth/Throat:      Mouth: Mucous membranes are moist.   Eyes:      Conjunctiva/sclera: Conjunctivae normal.      Pupils: Pupils are equal, round, and reactive to light.   Cardiovascular:      Rate and Rhythm: Normal rate and regular rhythm.      Pulses: Normal pulses.      Heart sounds: Normal heart sounds.   Pulmonary:      Effort: Pulmonary effort is normal.      Breath sounds: Normal breath sounds.   Abdominal:      General: Abdomen is flat. Bowel sounds are normal.      Palpations: Abdomen is soft.   Musculoskeletal:         General: No swelling. Normal range of motion.      Cervical back: Neck supple.   Skin:     General: Skin is warm and dry.      Coloration: Skin is not jaundiced.   Neurological:      General: No focal deficit present.      Mental Status: He is alert and oriented to person, place, and time. Mental status is at baseline.   Psychiatric:         Mood and Affect: Mood normal.         Behavior: Behavior normal.         Thought " Content: Thought content normal.         Judgment: Judgment normal.        Result Review :   Lab Results   Component Value Date    PROBNP 30.7 09/14/2021    PROBNP 17.7 06/23/2021     CMP          3/20/2024    12:34 8/5/2024    06:58 9/23/2024    06:39   CMP   Glucose 112  130  100    BUN 33  32  23    Creatinine 1.86  1.92  1.61    EGFR 35.5  33.9  41.9    Sodium 136  136  138    Potassium 4.6  5.1  4.9    Chloride 101  99  104    Calcium 9.5  9.0  9.2    Total Protein 6.5  6.3  5.9    Albumin 4.4  4.0  3.9    Globulin 2.1  2.3  2.0    Total Bilirubin 0.5  0.4  0.4    Alkaline Phosphatase 65  69  68    AST (SGOT) 16  14  18    ALT (SGPT) 17  13  14    Albumin/Globulin Ratio 2.1  1.7  2.0    BUN/Creatinine Ratio 17.7  16.7  14.3    Anion Gap 10.0  9.8  7.6      CBC w/diff          3/20/2024    12:34 8/5/2024    06:58 9/23/2024    06:39   CBC w/Diff   WBC 6.03  5.78  4.33    RBC 4.36  4.66  4.59    Hemoglobin 13.7  14.6  13.8    Hematocrit 40.0  43.6  43.3    MCV 91.7  93.6  94.3    MCH 31.4  31.3  30.1    MCHC 34.3  33.5  31.9    RDW 12.9  13.0  13.7    Platelets 138  159  121    Neutrophil Rel % 56.3  56.3  46.7    Immature Granulocyte Rel % 0.3  0.2  0.2    Lymphocyte Rel % 27.5  27.9  33.5    Monocyte Rel % 10.0  10.4  11.1    Eosinophil Rel % 5.1  4.7  7.6    Basophil Rel % 0.8  0.5  0.9       Lipid Panel          3/20/2024    12:34 9/23/2024    06:39   Lipid Panel   Total Cholesterol 134  113    Triglycerides 329  152    HDL Cholesterol 38  31    VLDL Cholesterol 50  26    LDL Cholesterol  46  56    LDL/HDL Ratio 0.79  1.66       Lab Results   Component Value Date    TSH 2.770 09/23/2024    TSH 2.110 03/20/2024    TSH 3.140 02/07/2023      Lab Results   Component Value Date    FREET4 1.25 09/23/2024    FREET4 1.32 03/20/2024    FREET4 1.26 02/07/2023      A1C Last 3 Results          3/20/2024    12:34 8/5/2024    06:58 9/23/2024    06:39   HGBA1C Last 3 Results   Hemoglobin A1C 6.10  6.00  5.70       PSA           3/20/2024    12:34 9/23/2024    06:39   PSA   PSA 0.105  0.084                     Visit Diagnoses:    ICD-10-CM ICD-9-CM   1. Medicare annual wellness visit, subsequent  Z00.00 V70.0   2. Mixed hyperlipidemia  E78.2 272.2   3. Coronary artery disease involving native coronary artery of native heart without angina pectoris  I25.10 414.01   4. Hypertension, essential  I10 401.9   5. IFG (impaired fasting glucose)  R73.01 790.21   6. Vitamin B 12 deficiency  E53.8 266.2   7. Screening PSA   Z12.5 V76.44   8. Colon cancer screening  Z12.11 V76.51   9. Vitamin D deficiency  E55.9 268.9   10. Hypothyroidism due to Hashimoto thyroiditis  E06.3 245.2   11. Paroxysmal atrial fibrillation  I48.0 427.31   12. History of prostate cancer  Z85.46 V10.46       Assessment and Plan   Diagnoses and all orders for this visit:    1. Medicare annual wellness visit, subsequent (Primary)  -     CBC & Differential; Future  -     Comprehensive Metabolic Panel; Future  -     PSA DIAGNOSTIC; Future  -     Hemoglobin A1c; Future  -     TSH+Free T4; Future  -     MicroAlbumin, Urine, Random - Urine, Clean Catch; Future    2. Mixed hyperlipidemia  -     CBC & Differential; Future  -     Comprehensive Metabolic Panel; Future  -     PSA DIAGNOSTIC; Future  -     Hemoglobin A1c; Future  -     TSH+Free T4; Future  -     MicroAlbumin, Urine, Random - Urine, Clean Catch; Future    3. Coronary artery disease involving native coronary artery of native heart without angina pectoris  -     CBC & Differential; Future  -     Comprehensive Metabolic Panel; Future  -     PSA DIAGNOSTIC; Future  -     Hemoglobin A1c; Future  -     TSH+Free T4; Future  -     MicroAlbumin, Urine, Random - Urine, Clean Catch; Future    4. Hypertension, essential  -     CBC & Differential; Future  -     Comprehensive Metabolic Panel; Future  -     PSA DIAGNOSTIC; Future  -     Hemoglobin A1c; Future  -     TSH+Free T4; Future  -     MicroAlbumin, Urine, Random - Urine,  Clean Catch; Future    5. IFG (impaired fasting glucose)  -     CBC & Differential; Future  -     Comprehensive Metabolic Panel; Future  -     PSA DIAGNOSTIC; Future  -     Hemoglobin A1c; Future  -     TSH+Free T4; Future  -     MicroAlbumin, Urine, Random - Urine, Clean Catch; Future    6. Vitamin B 12 deficiency  -     CBC & Differential; Future  -     Comprehensive Metabolic Panel; Future  -     PSA DIAGNOSTIC; Future  -     Hemoglobin A1c; Future  -     TSH+Free T4; Future  -     MicroAlbumin, Urine, Random - Urine, Clean Catch; Future    7. Screening PSA   -     CBC & Differential; Future  -     Comprehensive Metabolic Panel; Future  -     PSA DIAGNOSTIC; Future  -     Hemoglobin A1c; Future  -     TSH+Free T4; Future  -     MicroAlbumin, Urine, Random - Urine, Clean Catch; Future    8. Colon cancer screening  -     CBC & Differential; Future  -     Comprehensive Metabolic Panel; Future  -     PSA DIAGNOSTIC; Future  -     Hemoglobin A1c; Future  -     TSH+Free T4; Future  -     MicroAlbumin, Urine, Random - Urine, Clean Catch; Future    9. Vitamin D deficiency  -     CBC & Differential; Future  -     Comprehensive Metabolic Panel; Future  -     PSA DIAGNOSTIC; Future  -     Hemoglobin A1c; Future  -     TSH+Free T4; Future  -     MicroAlbumin, Urine, Random - Urine, Clean Catch; Future    10. Hypothyroidism due to Hashimoto thyroiditis  -     CBC & Differential; Future  -     Comprehensive Metabolic Panel; Future  -     PSA DIAGNOSTIC; Future  -     Hemoglobin A1c; Future  -     TSH+Free T4; Future  -     MicroAlbumin, Urine, Random - Urine, Clean Catch; Future    11. Paroxysmal atrial fibrillation  -     CBC & Differential; Future  -     Comprehensive Metabolic Panel; Future  -     PSA DIAGNOSTIC; Future  -     Hemoglobin A1c; Future  -     TSH+Free T4; Future  -     MicroAlbumin, Urine, Random - Urine, Clean Catch; Future    12. History of prostate cancer  -     CBC & Differential; Future  -      Comprehensive Metabolic Panel; Future  -     PSA DIAGNOSTIC; Future  -     Hemoglobin A1c; Future  -     TSH+Free T4; Future  -     MicroAlbumin, Urine, Random - Urine, Clean Catch; Future    Left nares skin lesion, sore, will follow closely refer to ENT possible,    Cerumen impactions removed bilateral left greater than right October 7, 2024    Medicare annual wellness visit completed October 7, 2024     Acute upper respiratory infection with some audible wheezing, will treat with antibiotic Omnicef, possible steroid, patient can use his inhaler albuterol and DayQuil or NyQuil at night and during the day as needed, August 27, 2024== COVID testing was negative today flu test was negative------- resolved October 7, 2024    Colonoscopy April 22, 2024 dr pérez,    IFG= hemoglobin A1c 5.7 September 23, 2024, continues Jardiance 10 mg daily by Dr. hernández    CKD stage IIIa, creatinine 1.4-1.8 -seeing dr hernández since August 2022    Coronary artery disease ---previous stent placement 2007, continues aspirin,    Insomnia, continues  Elavil 75 mg tablet ---half tablet at bedtime,    Hypertension continues  Norvasc 10 mg daily, irbesartan 300 mg daily    Lower extremity edema ---continues Lasix 20 mg daily, spironolactone 25 mg, half a tablet daily    Vitamin D and B12 deficiency continues replacement    Hyperlipidemia continues Lipitor 80 mg daily, zetia 10 mg qd , Vascepa 1 g 2 tablets bid    Hypothyroidism continues  levothyroxine 50 mcg daily    Paroxysmal atrial fibrillation, continues  sotalol 40 mg twice a day--dr martinez     Mild thrombocytopenia, chronic,     Prostate Ca==, dr olivarez prostate CA, seed implants, 2012, PSA = 0.08 September 23, 2024    Overweight, discussed weight loss efforts,            Follow Up   Return in about 6 months (around 4/7/2025).  Patient was given instructions and counseling regarding his condition or for health maintenance advice. Please see specific information pulled into the AVS if  appropriate.

## 2024-10-07 NOTE — ASSESSMENT & PLAN NOTE
Lipid abnormalities are stable    Plan:  Continue same medication/s without change.      Discussed medication dosage, use, side effects, and goals of treatment in detail.    Counseled patient on lifestyle modifications to help control hyperlipidemia.     Patient Treatment Goals:   LDL goal is less than 70  LDL goal is less than 55    Followup in 6 months.

## 2024-10-07 NOTE — ASSESSMENT & PLAN NOTE
Coronary Artery Disease (OPTIONAL): Coronary artery disease is stable.  Continue current treatment regimen. Continue current medications.  Cardiac status will be reassessed in 6 months.

## 2024-10-08 ENCOUNTER — OFFICE VISIT (OUTPATIENT)
Dept: CARDIAC REHAB | Facility: HOSPITAL | Age: 84
End: 2024-10-08

## 2024-10-08 VITALS
SYSTOLIC BLOOD PRESSURE: 110 MMHG | BODY MASS INDEX: 33.82 KG/M2 | HEART RATE: 61 BPM | WEIGHT: 235.67 LBS | DIASTOLIC BLOOD PRESSURE: 60 MMHG | OXYGEN SATURATION: 96 %

## 2024-10-08 DIAGNOSIS — Z95.5 HISTORY OF HEART ARTERY STENT: Primary | ICD-10-CM

## 2024-10-10 ENCOUNTER — OFFICE VISIT (OUTPATIENT)
Dept: CARDIAC REHAB | Facility: HOSPITAL | Age: 84
End: 2024-10-10

## 2024-10-10 VITALS
BODY MASS INDEX: 32.83 KG/M2 | DIASTOLIC BLOOD PRESSURE: 70 MMHG | WEIGHT: 228.84 LBS | OXYGEN SATURATION: 98 % | SYSTOLIC BLOOD PRESSURE: 118 MMHG | HEART RATE: 62 BPM

## 2024-10-10 DIAGNOSIS — Z95.5 HISTORY OF HEART ARTERY STENT: Primary | ICD-10-CM

## 2024-10-15 ENCOUNTER — OFFICE VISIT (OUTPATIENT)
Dept: CARDIAC REHAB | Facility: HOSPITAL | Age: 84
End: 2024-10-15

## 2024-10-15 VITALS
SYSTOLIC BLOOD PRESSURE: 120 MMHG | WEIGHT: 236.55 LBS | HEART RATE: 68 BPM | DIASTOLIC BLOOD PRESSURE: 78 MMHG | BODY MASS INDEX: 33.94 KG/M2 | OXYGEN SATURATION: 95 %

## 2024-10-15 DIAGNOSIS — Z95.5 HISTORY OF HEART ARTERY STENT: Primary | ICD-10-CM

## 2024-10-17 ENCOUNTER — OFFICE VISIT (OUTPATIENT)
Dept: CARDIAC REHAB | Facility: HOSPITAL | Age: 84
End: 2024-10-17

## 2024-10-17 VITALS
DIASTOLIC BLOOD PRESSURE: 60 MMHG | HEART RATE: 69 BPM | BODY MASS INDEX: 33.94 KG/M2 | OXYGEN SATURATION: 96 % | SYSTOLIC BLOOD PRESSURE: 120 MMHG | WEIGHT: 236.55 LBS

## 2024-10-17 DIAGNOSIS — Z95.5 HISTORY OF HEART ARTERY STENT: Primary | ICD-10-CM

## 2024-10-22 ENCOUNTER — OFFICE VISIT (OUTPATIENT)
Dept: CARDIAC REHAB | Facility: HOSPITAL | Age: 84
End: 2024-10-22

## 2024-10-22 VITALS
BODY MASS INDEX: 33.85 KG/M2 | DIASTOLIC BLOOD PRESSURE: 70 MMHG | SYSTOLIC BLOOD PRESSURE: 120 MMHG | OXYGEN SATURATION: 97 % | WEIGHT: 235.89 LBS | HEART RATE: 70 BPM

## 2024-10-22 DIAGNOSIS — Z95.5 HISTORY OF HEART ARTERY STENT: Primary | ICD-10-CM

## 2024-10-24 ENCOUNTER — OFFICE VISIT (OUTPATIENT)
Dept: CARDIAC REHAB | Facility: HOSPITAL | Age: 84
End: 2024-10-24

## 2024-10-24 VITALS
SYSTOLIC BLOOD PRESSURE: 128 MMHG | DIASTOLIC BLOOD PRESSURE: 80 MMHG | WEIGHT: 235.89 LBS | HEART RATE: 68 BPM | BODY MASS INDEX: 33.85 KG/M2 | OXYGEN SATURATION: 97 %

## 2024-10-24 DIAGNOSIS — Z95.5 HISTORY OF HEART ARTERY STENT: Primary | ICD-10-CM

## 2024-11-07 ENCOUNTER — OFFICE VISIT (OUTPATIENT)
Dept: CARDIAC REHAB | Facility: HOSPITAL | Age: 84
End: 2024-11-07

## 2024-11-07 VITALS
WEIGHT: 238.1 LBS | HEART RATE: 67 BPM | SYSTOLIC BLOOD PRESSURE: 118 MMHG | OXYGEN SATURATION: 96 % | BODY MASS INDEX: 34.16 KG/M2 | DIASTOLIC BLOOD PRESSURE: 70 MMHG

## 2024-11-07 DIAGNOSIS — Z95.5 HISTORY OF HEART ARTERY STENT: Primary | ICD-10-CM

## 2024-11-14 ENCOUNTER — OFFICE VISIT (OUTPATIENT)
Dept: CARDIAC REHAB | Facility: HOSPITAL | Age: 84
End: 2024-11-14

## 2024-11-14 VITALS
SYSTOLIC BLOOD PRESSURE: 100 MMHG | WEIGHT: 233.69 LBS | DIASTOLIC BLOOD PRESSURE: 60 MMHG | OXYGEN SATURATION: 95 % | BODY MASS INDEX: 33.53 KG/M2

## 2024-11-14 DIAGNOSIS — Z95.5 HISTORY OF HEART ARTERY STENT: Primary | ICD-10-CM

## 2024-11-14 PROCEDURE — 93798 PHYS/QHP OP CAR RHAB W/ECG: CPT

## 2024-11-19 ENCOUNTER — OFFICE VISIT (OUTPATIENT)
Dept: CARDIAC REHAB | Facility: HOSPITAL | Age: 84
End: 2024-11-19

## 2024-11-19 VITALS
OXYGEN SATURATION: 95 % | WEIGHT: 235.89 LBS | HEART RATE: 74 BPM | SYSTOLIC BLOOD PRESSURE: 102 MMHG | DIASTOLIC BLOOD PRESSURE: 60 MMHG | BODY MASS INDEX: 33.85 KG/M2

## 2024-11-19 DIAGNOSIS — Z95.5 HISTORY OF HEART ARTERY STENT: Primary | ICD-10-CM

## 2024-11-21 ENCOUNTER — OFFICE VISIT (OUTPATIENT)
Dept: CARDIAC REHAB | Facility: HOSPITAL | Age: 84
End: 2024-11-21

## 2024-11-21 VITALS
OXYGEN SATURATION: 95 % | WEIGHT: 224.87 LBS | BODY MASS INDEX: 32.27 KG/M2 | DIASTOLIC BLOOD PRESSURE: 60 MMHG | HEART RATE: 73 BPM | SYSTOLIC BLOOD PRESSURE: 118 MMHG

## 2024-11-21 DIAGNOSIS — Z95.5 HISTORY OF HEART ARTERY STENT: Primary | ICD-10-CM

## 2024-11-26 ENCOUNTER — OFFICE VISIT (OUTPATIENT)
Dept: CARDIAC REHAB | Facility: HOSPITAL | Age: 84
End: 2024-11-26

## 2024-11-26 VITALS
BODY MASS INDEX: 34.1 KG/M2 | SYSTOLIC BLOOD PRESSURE: 110 MMHG | WEIGHT: 237.66 LBS | DIASTOLIC BLOOD PRESSURE: 70 MMHG | OXYGEN SATURATION: 97 % | HEART RATE: 72 BPM

## 2024-11-26 DIAGNOSIS — Z95.5 HISTORY OF HEART ARTERY STENT: Primary | ICD-10-CM

## 2024-12-03 ENCOUNTER — APPOINTMENT (OUTPATIENT)
Dept: CARDIAC REHAB | Facility: HOSPITAL | Age: 84
End: 2024-12-03

## 2024-12-05 ENCOUNTER — OFFICE VISIT (OUTPATIENT)
Dept: CARDIAC REHAB | Facility: HOSPITAL | Age: 84
End: 2024-12-05

## 2024-12-05 VITALS
SYSTOLIC BLOOD PRESSURE: 120 MMHG | BODY MASS INDEX: 33.88 KG/M2 | HEART RATE: 69 BPM | DIASTOLIC BLOOD PRESSURE: 70 MMHG | WEIGHT: 236.11 LBS | OXYGEN SATURATION: 96 %

## 2024-12-05 DIAGNOSIS — Z95.5 HISTORY OF HEART ARTERY STENT: Primary | ICD-10-CM

## 2024-12-10 ENCOUNTER — OFFICE VISIT (OUTPATIENT)
Dept: CARDIAC REHAB | Facility: HOSPITAL | Age: 84
End: 2024-12-10

## 2024-12-10 VITALS
BODY MASS INDEX: 33.97 KG/M2 | OXYGEN SATURATION: 94 % | DIASTOLIC BLOOD PRESSURE: 62 MMHG | HEART RATE: 75 BPM | SYSTOLIC BLOOD PRESSURE: 120 MMHG | WEIGHT: 236.77 LBS

## 2024-12-10 DIAGNOSIS — Z95.5 HISTORY OF HEART ARTERY STENT: Primary | ICD-10-CM

## 2024-12-12 ENCOUNTER — OFFICE VISIT (OUTPATIENT)
Dept: CARDIAC REHAB | Facility: HOSPITAL | Age: 84
End: 2024-12-12

## 2024-12-12 VITALS
DIASTOLIC BLOOD PRESSURE: 78 MMHG | HEART RATE: 78 BPM | BODY MASS INDEX: 34.16 KG/M2 | WEIGHT: 238.1 LBS | SYSTOLIC BLOOD PRESSURE: 126 MMHG | OXYGEN SATURATION: 96 %

## 2024-12-12 DIAGNOSIS — Z95.5 HISTORY OF HEART ARTERY STENT: Primary | ICD-10-CM

## 2024-12-13 ENCOUNTER — TELEPHONE (OUTPATIENT)
Dept: INTERNAL MEDICINE | Age: 84
End: 2024-12-13
Payer: MEDICARE

## 2024-12-13 DIAGNOSIS — I48.0 PAROXYSMAL ATRIAL FIBRILLATION: ICD-10-CM

## 2024-12-13 DIAGNOSIS — I25.10 CORONARY ARTERY DISEASE INVOLVING NATIVE CORONARY ARTERY OF NATIVE HEART WITHOUT ANGINA PECTORIS: Primary | ICD-10-CM

## 2024-12-13 NOTE — TELEPHONE ENCOUNTER
Caller: Michael Sol    Relationship: Self    Best call back number: [phone]    What orders are you requesting (i.e. lab or imaging): PHASE 3    Where will you receive your lab/imaging services: [facility]     Additional notes: PATIENT WOULD LIKE CALL WHEN ENTERED

## 2024-12-17 ENCOUNTER — OFFICE VISIT (OUTPATIENT)
Dept: CARDIAC REHAB | Facility: HOSPITAL | Age: 84
End: 2024-12-17

## 2024-12-17 VITALS
WEIGHT: 235.89 LBS | DIASTOLIC BLOOD PRESSURE: 68 MMHG | OXYGEN SATURATION: 94 % | SYSTOLIC BLOOD PRESSURE: 110 MMHG | HEART RATE: 64 BPM | BODY MASS INDEX: 33.85 KG/M2

## 2024-12-17 DIAGNOSIS — Z95.5 HISTORY OF HEART ARTERY STENT: Primary | ICD-10-CM

## 2024-12-19 ENCOUNTER — OFFICE VISIT (OUTPATIENT)
Dept: CARDIAC REHAB | Facility: HOSPITAL | Age: 84
End: 2024-12-19

## 2024-12-19 VITALS
BODY MASS INDEX: 34.2 KG/M2 | SYSTOLIC BLOOD PRESSURE: 100 MMHG | HEART RATE: 63 BPM | DIASTOLIC BLOOD PRESSURE: 60 MMHG | WEIGHT: 238.32 LBS | OXYGEN SATURATION: 95 %

## 2024-12-19 DIAGNOSIS — Z95.5 HISTORY OF HEART ARTERY STENT: Primary | ICD-10-CM

## 2024-12-26 ENCOUNTER — APPOINTMENT (OUTPATIENT)
Dept: CARDIAC REHAB | Facility: HOSPITAL | Age: 84
End: 2024-12-26

## 2025-01-02 ENCOUNTER — OFFICE VISIT (OUTPATIENT)
Dept: CARDIAC REHAB | Facility: HOSPITAL | Age: 85
End: 2025-01-02

## 2025-01-02 VITALS
WEIGHT: 236.99 LBS | OXYGEN SATURATION: 96 % | BODY MASS INDEX: 34.01 KG/M2 | SYSTOLIC BLOOD PRESSURE: 108 MMHG | DIASTOLIC BLOOD PRESSURE: 62 MMHG | HEART RATE: 70 BPM

## 2025-01-02 DIAGNOSIS — Z95.5 HISTORY OF HEART ARTERY STENT: Primary | ICD-10-CM

## 2025-01-07 ENCOUNTER — APPOINTMENT (OUTPATIENT)
Dept: CARDIAC REHAB | Facility: HOSPITAL | Age: 85
End: 2025-01-07

## 2025-01-08 ENCOUNTER — OFFICE VISIT (OUTPATIENT)
Dept: CARDIAC REHAB | Facility: HOSPITAL | Age: 85
End: 2025-01-08

## 2025-01-08 VITALS
WEIGHT: 235.89 LBS | OXYGEN SATURATION: 96 % | SYSTOLIC BLOOD PRESSURE: 120 MMHG | DIASTOLIC BLOOD PRESSURE: 70 MMHG | BODY MASS INDEX: 33.85 KG/M2 | HEART RATE: 75 BPM

## 2025-01-08 DIAGNOSIS — Z95.5 HISTORY OF HEART ARTERY STENT: Primary | ICD-10-CM

## 2025-01-09 ENCOUNTER — OFFICE VISIT (OUTPATIENT)
Dept: CARDIAC REHAB | Facility: HOSPITAL | Age: 85
End: 2025-01-09

## 2025-01-09 VITALS — SYSTOLIC BLOOD PRESSURE: 110 MMHG | DIASTOLIC BLOOD PRESSURE: 62 MMHG | HEART RATE: 74 BPM | OXYGEN SATURATION: 96 %

## 2025-01-09 DIAGNOSIS — Z95.5 HISTORY OF HEART ARTERY STENT: Primary | ICD-10-CM

## 2025-01-14 ENCOUNTER — OFFICE VISIT (OUTPATIENT)
Dept: CARDIAC REHAB | Facility: HOSPITAL | Age: 85
End: 2025-01-14

## 2025-01-14 VITALS — SYSTOLIC BLOOD PRESSURE: 120 MMHG | OXYGEN SATURATION: 98 % | HEART RATE: 64 BPM | DIASTOLIC BLOOD PRESSURE: 60 MMHG

## 2025-01-14 DIAGNOSIS — Z95.5 HISTORY OF HEART ARTERY STENT: Primary | ICD-10-CM

## 2025-01-16 ENCOUNTER — OFFICE VISIT (OUTPATIENT)
Dept: CARDIAC REHAB | Facility: HOSPITAL | Age: 85
End: 2025-01-16

## 2025-01-16 VITALS — OXYGEN SATURATION: 95 % | SYSTOLIC BLOOD PRESSURE: 120 MMHG | HEART RATE: 65 BPM | DIASTOLIC BLOOD PRESSURE: 70 MMHG

## 2025-01-16 DIAGNOSIS — Z95.5 HISTORY OF HEART ARTERY STENT: Primary | ICD-10-CM

## 2025-01-21 ENCOUNTER — OFFICE VISIT (OUTPATIENT)
Dept: CARDIAC REHAB | Facility: HOSPITAL | Age: 85
End: 2025-01-21

## 2025-01-21 VITALS — SYSTOLIC BLOOD PRESSURE: 122 MMHG | DIASTOLIC BLOOD PRESSURE: 70 MMHG | HEART RATE: 68 BPM | OXYGEN SATURATION: 97 %

## 2025-01-21 DIAGNOSIS — Z95.5 HISTORY OF HEART ARTERY STENT: Primary | ICD-10-CM

## 2025-01-23 ENCOUNTER — OFFICE VISIT (OUTPATIENT)
Dept: CARDIAC REHAB | Facility: HOSPITAL | Age: 85
End: 2025-01-23

## 2025-01-23 VITALS — OXYGEN SATURATION: 96 % | HEART RATE: 70 BPM | DIASTOLIC BLOOD PRESSURE: 70 MMHG | SYSTOLIC BLOOD PRESSURE: 120 MMHG

## 2025-01-23 DIAGNOSIS — Z95.5 HISTORY OF HEART ARTERY STENT: Primary | ICD-10-CM

## 2025-01-28 ENCOUNTER — OFFICE VISIT (OUTPATIENT)
Dept: CARDIAC REHAB | Facility: HOSPITAL | Age: 85
End: 2025-01-28

## 2025-01-28 VITALS
BODY MASS INDEX: 34.04 KG/M2 | HEART RATE: 66 BPM | OXYGEN SATURATION: 96 % | DIASTOLIC BLOOD PRESSURE: 60 MMHG | WEIGHT: 237.22 LBS | SYSTOLIC BLOOD PRESSURE: 102 MMHG

## 2025-01-28 DIAGNOSIS — Z95.5 HISTORY OF HEART ARTERY STENT: Primary | ICD-10-CM

## 2025-02-04 ENCOUNTER — OFFICE VISIT (OUTPATIENT)
Dept: CARDIAC REHAB | Facility: HOSPITAL | Age: 85
End: 2025-02-04

## 2025-02-04 VITALS
SYSTOLIC BLOOD PRESSURE: 110 MMHG | DIASTOLIC BLOOD PRESSURE: 72 MMHG | OXYGEN SATURATION: 97 % | WEIGHT: 241.18 LBS | BODY MASS INDEX: 34.61 KG/M2 | HEART RATE: 67 BPM

## 2025-02-04 DIAGNOSIS — Z95.5 HISTORY OF HEART ARTERY STENT: Primary | ICD-10-CM

## 2025-02-06 ENCOUNTER — OFFICE VISIT (OUTPATIENT)
Dept: CARDIAC REHAB | Facility: HOSPITAL | Age: 85
End: 2025-02-06

## 2025-02-06 VITALS — SYSTOLIC BLOOD PRESSURE: 126 MMHG | HEART RATE: 70 BPM | OXYGEN SATURATION: 98 % | DIASTOLIC BLOOD PRESSURE: 72 MMHG

## 2025-02-06 DIAGNOSIS — Z95.5 HISTORY OF HEART ARTERY STENT: Primary | ICD-10-CM

## 2025-02-11 ENCOUNTER — OFFICE VISIT (OUTPATIENT)
Dept: CARDIAC REHAB | Facility: HOSPITAL | Age: 85
End: 2025-02-11

## 2025-02-11 VITALS
DIASTOLIC BLOOD PRESSURE: 62 MMHG | BODY MASS INDEX: 34.54 KG/M2 | HEART RATE: 69 BPM | SYSTOLIC BLOOD PRESSURE: 108 MMHG | WEIGHT: 240.74 LBS | OXYGEN SATURATION: 97 %

## 2025-02-11 DIAGNOSIS — Z95.5 HISTORY OF HEART ARTERY STENT: Primary | ICD-10-CM

## 2025-02-13 ENCOUNTER — OFFICE VISIT (OUTPATIENT)
Dept: CARDIAC REHAB | Facility: HOSPITAL | Age: 85
End: 2025-02-13

## 2025-02-13 VITALS
BODY MASS INDEX: 34.2 KG/M2 | OXYGEN SATURATION: 96 % | DIASTOLIC BLOOD PRESSURE: 64 MMHG | HEART RATE: 79 BPM | WEIGHT: 238.32 LBS | SYSTOLIC BLOOD PRESSURE: 110 MMHG

## 2025-02-13 DIAGNOSIS — Z95.5 HISTORY OF HEART ARTERY STENT: Primary | ICD-10-CM

## 2025-02-17 ENCOUNTER — TRANSCRIBE ORDERS (OUTPATIENT)
Dept: LAB | Facility: HOSPITAL | Age: 85
End: 2025-02-17
Payer: MEDICARE

## 2025-02-17 ENCOUNTER — LAB (OUTPATIENT)
Dept: LAB | Facility: HOSPITAL | Age: 85
End: 2025-02-17
Payer: MEDICARE

## 2025-02-17 DIAGNOSIS — E55.9 VITAMIN D DEFICIENCY: ICD-10-CM

## 2025-02-17 DIAGNOSIS — I48.0 PAROXYSMAL ATRIAL FIBRILLATION: ICD-10-CM

## 2025-02-17 DIAGNOSIS — N18.32 STAGE 3B CHRONIC KIDNEY DISEASE: ICD-10-CM

## 2025-02-17 DIAGNOSIS — E78.2 MIXED HYPERLIPIDEMIA: ICD-10-CM

## 2025-02-17 DIAGNOSIS — I10 HYPERTENSION, ESSENTIAL: ICD-10-CM

## 2025-02-17 DIAGNOSIS — E55.9 VITAMIN D DEFICIENCY: Primary | ICD-10-CM

## 2025-02-17 LAB
25(OH)D3 SERPL-MCNC: 49.2 NG/ML (ref 30–100)
ALBUMIN SERPL-MCNC: 3.8 G/DL (ref 3.5–5.2)
ALBUMIN/GLOB SERPL: 1.2 G/DL
ALP SERPL-CCNC: 88 U/L (ref 39–117)
ALT SERPL W P-5'-P-CCNC: 21 U/L (ref 1–41)
ANION GAP SERPL CALCULATED.3IONS-SCNC: 8.2 MMOL/L (ref 5–15)
AST SERPL-CCNC: 19 U/L (ref 1–40)
BACTERIA UR QL AUTO: NORMAL /HPF
BASOPHILS # BLD AUTO: 0.04 10*3/MM3 (ref 0–0.2)
BASOPHILS NFR BLD AUTO: 0.8 % (ref 0–1.5)
BILIRUB SERPL-MCNC: 0.5 MG/DL (ref 0–1.2)
BILIRUB UR QL STRIP: NEGATIVE
BUN SERPL-MCNC: 26 MG/DL (ref 8–23)
BUN/CREAT SERPL: 12.1 (ref 7–25)
CALCIUM SPEC-SCNC: 9.7 MG/DL (ref 8.6–10.5)
CHLORIDE SERPL-SCNC: 102 MMOL/L (ref 98–107)
CLARITY UR: CLEAR
CO2 SERPL-SCNC: 27.8 MMOL/L (ref 22–29)
COLOR UR: YELLOW
CREAT SERPL-MCNC: 2.14 MG/DL (ref 0.76–1.27)
CREAT UR-MCNC: 20.9 MG/DL
DEPRECATED RDW RBC AUTO: 42 FL (ref 37–54)
EGFRCR SERPLBLD CKD-EPI 2021: 29.8 ML/MIN/1.73
EOSINOPHIL # BLD AUTO: 0.27 10*3/MM3 (ref 0–0.4)
EOSINOPHIL NFR BLD AUTO: 5.2 % (ref 0.3–6.2)
ERYTHROCYTE [DISTWIDTH] IN BLOOD BY AUTOMATED COUNT: 12.8 % (ref 12.3–15.4)
GLOBULIN UR ELPH-MCNC: 3.3 GM/DL
GLUCOSE SERPL-MCNC: 116 MG/DL (ref 65–99)
GLUCOSE UR STRIP-MCNC: ABNORMAL MG/DL
HCT VFR BLD AUTO: 45.5 % (ref 37.5–51)
HGB BLD-MCNC: 15.7 G/DL (ref 13–17.7)
HGB UR QL STRIP.AUTO: NEGATIVE
HYALINE CASTS UR QL AUTO: NORMAL /LPF
IMM GRANULOCYTES # BLD AUTO: 0.01 10*3/MM3 (ref 0–0.05)
IMM GRANULOCYTES NFR BLD AUTO: 0.2 % (ref 0–0.5)
KETONES UR QL STRIP: NEGATIVE
LEUKOCYTE ESTERASE UR QL STRIP.AUTO: ABNORMAL
LYMPHOCYTES # BLD AUTO: 1.37 10*3/MM3 (ref 0.7–3.1)
LYMPHOCYTES NFR BLD AUTO: 26.5 % (ref 19.6–45.3)
MCH RBC QN AUTO: 31.2 PG (ref 26.6–33)
MCHC RBC AUTO-ENTMCNC: 34.5 G/DL (ref 31.5–35.7)
MCV RBC AUTO: 90.3 FL (ref 79–97)
MONOCYTES # BLD AUTO: 0.42 10*3/MM3 (ref 0.1–0.9)
MONOCYTES NFR BLD AUTO: 8.1 % (ref 5–12)
NEUTROPHILS NFR BLD AUTO: 3.06 10*3/MM3 (ref 1.7–7)
NEUTROPHILS NFR BLD AUTO: 59.2 % (ref 42.7–76)
NITRITE UR QL STRIP: NEGATIVE
NRBC BLD AUTO-RTO: 0 /100 WBC (ref 0–0.2)
PH UR STRIP.AUTO: 6.5 [PH] (ref 5–8)
PHOSPHATE SERPL-MCNC: 3.2 MG/DL (ref 2.5–4.5)
PLATELET # BLD AUTO: 137 10*3/MM3 (ref 140–450)
PMV BLD AUTO: 10.5 FL (ref 6–12)
POTASSIUM SERPL-SCNC: 4.7 MMOL/L (ref 3.5–5.2)
PROT ?TM UR-MCNC: <4 MG/DL
PROT SERPL-MCNC: 7.1 G/DL (ref 6–8.5)
PROT UR QL STRIP: NEGATIVE
PROT/CREAT UR: NORMAL MG/G{CREAT}
PTH-INTACT SERPL-MCNC: 49 PG/ML (ref 15–65)
RBC # BLD AUTO: 5.04 10*6/MM3 (ref 4.14–5.8)
RBC # UR STRIP: NORMAL /HPF
REF LAB TEST METHOD: NORMAL
SODIUM SERPL-SCNC: 138 MMOL/L (ref 136–145)
SP GR UR STRIP: 1.01 (ref 1–1.03)
SQUAMOUS #/AREA URNS HPF: NORMAL /HPF
UROBILINOGEN UR QL STRIP: ABNORMAL
WBC # UR STRIP: NORMAL /HPF
WBC NRBC COR # BLD AUTO: 5.17 10*3/MM3 (ref 3.4–10.8)

## 2025-02-17 PROCEDURE — 85025 COMPLETE CBC W/AUTO DIFF WBC: CPT

## 2025-02-17 PROCEDURE — 84156 ASSAY OF PROTEIN URINE: CPT

## 2025-02-17 PROCEDURE — 84100 ASSAY OF PHOSPHORUS: CPT

## 2025-02-17 PROCEDURE — 81001 URINALYSIS AUTO W/SCOPE: CPT

## 2025-02-17 PROCEDURE — 82570 ASSAY OF URINE CREATININE: CPT

## 2025-02-17 PROCEDURE — 82306 VITAMIN D 25 HYDROXY: CPT

## 2025-02-17 PROCEDURE — 80053 COMPREHEN METABOLIC PANEL: CPT

## 2025-02-17 PROCEDURE — 36415 COLL VENOUS BLD VENIPUNCTURE: CPT

## 2025-02-17 PROCEDURE — 83970 ASSAY OF PARATHORMONE: CPT

## 2025-02-20 ENCOUNTER — OFFICE VISIT (OUTPATIENT)
Dept: CARDIAC REHAB | Facility: HOSPITAL | Age: 85
End: 2025-02-20

## 2025-02-20 VITALS
SYSTOLIC BLOOD PRESSURE: 118 MMHG | DIASTOLIC BLOOD PRESSURE: 74 MMHG | WEIGHT: 235.89 LBS | BODY MASS INDEX: 33.85 KG/M2 | OXYGEN SATURATION: 96 % | HEART RATE: 76 BPM

## 2025-02-20 DIAGNOSIS — Z95.5 HISTORY OF HEART ARTERY STENT: Primary | ICD-10-CM

## 2025-02-25 ENCOUNTER — OFFICE VISIT (OUTPATIENT)
Dept: CARDIAC REHAB | Facility: HOSPITAL | Age: 85
End: 2025-02-25

## 2025-02-25 VITALS
BODY MASS INDEX: 34.32 KG/M2 | DIASTOLIC BLOOD PRESSURE: 80 MMHG | HEART RATE: 71 BPM | SYSTOLIC BLOOD PRESSURE: 118 MMHG | OXYGEN SATURATION: 95 % | WEIGHT: 239.2 LBS

## 2025-02-25 DIAGNOSIS — Z95.5 HISTORY OF HEART ARTERY STENT: Primary | ICD-10-CM

## 2025-03-04 ENCOUNTER — OFFICE VISIT (OUTPATIENT)
Dept: CARDIAC REHAB | Facility: HOSPITAL | Age: 85
End: 2025-03-04

## 2025-03-04 VITALS
SYSTOLIC BLOOD PRESSURE: 120 MMHG | DIASTOLIC BLOOD PRESSURE: 58 MMHG | BODY MASS INDEX: 34.26 KG/M2 | HEART RATE: 66 BPM | WEIGHT: 238.76 LBS | OXYGEN SATURATION: 96 %

## 2025-03-04 DIAGNOSIS — Z95.5 HISTORY OF HEART ARTERY STENT: Primary | ICD-10-CM

## 2025-03-06 ENCOUNTER — OFFICE VISIT (OUTPATIENT)
Dept: CARDIAC REHAB | Facility: HOSPITAL | Age: 85
End: 2025-03-06

## 2025-03-06 VITALS
SYSTOLIC BLOOD PRESSURE: 140 MMHG | BODY MASS INDEX: 34.2 KG/M2 | HEART RATE: 65 BPM | DIASTOLIC BLOOD PRESSURE: 70 MMHG | OXYGEN SATURATION: 97 % | WEIGHT: 238.32 LBS

## 2025-03-06 DIAGNOSIS — Z95.5 HISTORY OF HEART ARTERY STENT: Primary | ICD-10-CM

## 2025-03-11 ENCOUNTER — OFFICE VISIT (OUTPATIENT)
Dept: CARDIAC REHAB | Facility: HOSPITAL | Age: 85
End: 2025-03-11

## 2025-03-11 VITALS
WEIGHT: 238.54 LBS | HEART RATE: 59 BPM | OXYGEN SATURATION: 96 % | DIASTOLIC BLOOD PRESSURE: 78 MMHG | SYSTOLIC BLOOD PRESSURE: 110 MMHG | BODY MASS INDEX: 34.23 KG/M2

## 2025-03-11 DIAGNOSIS — Z95.5 HISTORY OF HEART ARTERY STENT: Primary | ICD-10-CM

## 2025-03-13 ENCOUNTER — OFFICE VISIT (OUTPATIENT)
Dept: CARDIAC REHAB | Facility: HOSPITAL | Age: 85
End: 2025-03-13

## 2025-03-13 VITALS — SYSTOLIC BLOOD PRESSURE: 110 MMHG | DIASTOLIC BLOOD PRESSURE: 60 MMHG | HEART RATE: 62 BPM | OXYGEN SATURATION: 97 %

## 2025-03-13 DIAGNOSIS — Z95.5 HISTORY OF HEART ARTERY STENT: Primary | ICD-10-CM

## 2025-03-18 ENCOUNTER — OFFICE VISIT (OUTPATIENT)
Dept: CARDIAC REHAB | Facility: HOSPITAL | Age: 85
End: 2025-03-18

## 2025-03-18 VITALS
WEIGHT: 240.52 LBS | HEART RATE: 71 BPM | SYSTOLIC BLOOD PRESSURE: 112 MMHG | OXYGEN SATURATION: 97 % | BODY MASS INDEX: 34.51 KG/M2 | DIASTOLIC BLOOD PRESSURE: 70 MMHG

## 2025-03-18 DIAGNOSIS — Z95.5 HISTORY OF HEART ARTERY STENT: Primary | ICD-10-CM

## 2025-03-20 ENCOUNTER — OFFICE VISIT (OUTPATIENT)
Dept: CARDIAC REHAB | Facility: HOSPITAL | Age: 85
End: 2025-03-20

## 2025-03-20 VITALS — HEART RATE: 80 BPM | OXYGEN SATURATION: 96 % | DIASTOLIC BLOOD PRESSURE: 74 MMHG | SYSTOLIC BLOOD PRESSURE: 118 MMHG

## 2025-03-20 DIAGNOSIS — Z95.5 HISTORY OF HEART ARTERY STENT: Primary | ICD-10-CM

## 2025-03-25 ENCOUNTER — OFFICE VISIT (OUTPATIENT)
Dept: CARDIAC REHAB | Facility: HOSPITAL | Age: 85
End: 2025-03-25

## 2025-03-25 VITALS
HEART RATE: 67 BPM | WEIGHT: 240.08 LBS | DIASTOLIC BLOOD PRESSURE: 80 MMHG | BODY MASS INDEX: 34.45 KG/M2 | SYSTOLIC BLOOD PRESSURE: 130 MMHG | OXYGEN SATURATION: 96 %

## 2025-03-25 DIAGNOSIS — Z95.5 HISTORY OF HEART ARTERY STENT: Primary | ICD-10-CM

## 2025-03-27 ENCOUNTER — OFFICE VISIT (OUTPATIENT)
Dept: CARDIAC REHAB | Facility: HOSPITAL | Age: 85
End: 2025-03-27

## 2025-03-27 VITALS
WEIGHT: 235.89 LBS | BODY MASS INDEX: 33.85 KG/M2 | OXYGEN SATURATION: 97 % | SYSTOLIC BLOOD PRESSURE: 128 MMHG | HEART RATE: 65 BPM | DIASTOLIC BLOOD PRESSURE: 74 MMHG

## 2025-03-27 DIAGNOSIS — Z95.5 HISTORY OF HEART ARTERY STENT: Primary | ICD-10-CM

## 2025-04-01 ENCOUNTER — LAB (OUTPATIENT)
Dept: LAB | Facility: HOSPITAL | Age: 85
End: 2025-04-01
Payer: MEDICARE

## 2025-04-01 ENCOUNTER — OFFICE VISIT (OUTPATIENT)
Dept: CARDIAC REHAB | Facility: HOSPITAL | Age: 85
End: 2025-04-01

## 2025-04-01 VITALS
OXYGEN SATURATION: 96 % | DIASTOLIC BLOOD PRESSURE: 76 MMHG | SYSTOLIC BLOOD PRESSURE: 118 MMHG | WEIGHT: 240.08 LBS | HEART RATE: 66 BPM | BODY MASS INDEX: 34.45 KG/M2

## 2025-04-01 DIAGNOSIS — E78.2 MIXED HYPERLIPIDEMIA: ICD-10-CM

## 2025-04-01 DIAGNOSIS — I10 HYPERTENSION, ESSENTIAL: ICD-10-CM

## 2025-04-01 DIAGNOSIS — Z95.5 HISTORY OF HEART ARTERY STENT: Primary | ICD-10-CM

## 2025-04-01 DIAGNOSIS — E06.3 HYPOTHYROIDISM DUE TO HASHIMOTO THYROIDITIS: ICD-10-CM

## 2025-04-01 DIAGNOSIS — Z00.00 MEDICARE ANNUAL WELLNESS VISIT, SUBSEQUENT: ICD-10-CM

## 2025-04-01 DIAGNOSIS — Z12.11 COLON CANCER SCREENING: ICD-10-CM

## 2025-04-01 DIAGNOSIS — R73.01 IFG (IMPAIRED FASTING GLUCOSE): ICD-10-CM

## 2025-04-01 DIAGNOSIS — E55.9 VITAMIN D DEFICIENCY: ICD-10-CM

## 2025-04-01 DIAGNOSIS — I48.0 PAROXYSMAL ATRIAL FIBRILLATION: ICD-10-CM

## 2025-04-01 DIAGNOSIS — I25.10 CORONARY ARTERY DISEASE INVOLVING NATIVE CORONARY ARTERY OF NATIVE HEART WITHOUT ANGINA PECTORIS: ICD-10-CM

## 2025-04-01 DIAGNOSIS — Z85.46 HISTORY OF PROSTATE CANCER: ICD-10-CM

## 2025-04-01 DIAGNOSIS — Z12.5 SCREENING PSA (PROSTATE SPECIFIC ANTIGEN): ICD-10-CM

## 2025-04-01 DIAGNOSIS — E53.8 VITAMIN B 12 DEFICIENCY: ICD-10-CM

## 2025-04-01 LAB
ALBUMIN SERPL-MCNC: 4.2 G/DL (ref 3.5–5.2)
ALBUMIN UR-MCNC: 2 MG/DL
ALBUMIN/GLOB SERPL: 1.8 G/DL
ALP SERPL-CCNC: 75 U/L (ref 39–117)
ALT SERPL W P-5'-P-CCNC: 22 U/L (ref 1–41)
ANION GAP SERPL CALCULATED.3IONS-SCNC: 9.3 MMOL/L (ref 5–15)
AST SERPL-CCNC: 26 U/L (ref 1–40)
BASOPHILS # BLD AUTO: 0.04 10*3/MM3 (ref 0–0.2)
BASOPHILS NFR BLD AUTO: 0.7 % (ref 0–1.5)
BILIRUB SERPL-MCNC: 0.6 MG/DL (ref 0–1.2)
BUN SERPL-MCNC: 25 MG/DL (ref 8–23)
BUN/CREAT SERPL: 13.1 (ref 7–25)
CALCIUM SPEC-SCNC: 9.7 MG/DL (ref 8.6–10.5)
CHLORIDE SERPL-SCNC: 100 MMOL/L (ref 98–107)
CO2 SERPL-SCNC: 26.7 MMOL/L (ref 22–29)
CREAT SERPL-MCNC: 1.91 MG/DL (ref 0.76–1.27)
DEPRECATED RDW RBC AUTO: 45.5 FL (ref 37–54)
EGFRCR SERPLBLD CKD-EPI 2021: 34.1 ML/MIN/1.73
EOSINOPHIL # BLD AUTO: 0.23 10*3/MM3 (ref 0–0.4)
EOSINOPHIL NFR BLD AUTO: 4 % (ref 0.3–6.2)
ERYTHROCYTE [DISTWIDTH] IN BLOOD BY AUTOMATED COUNT: 13.2 % (ref 12.3–15.4)
GLOBULIN UR ELPH-MCNC: 2.3 GM/DL
GLUCOSE SERPL-MCNC: 109 MG/DL (ref 65–99)
HBA1C MFR BLD: 5.8 % (ref 4.8–5.6)
HCT VFR BLD AUTO: 47.2 % (ref 37.5–51)
HGB BLD-MCNC: 15.5 G/DL (ref 13–17.7)
IMM GRANULOCYTES # BLD AUTO: 0.02 10*3/MM3 (ref 0–0.05)
IMM GRANULOCYTES NFR BLD AUTO: 0.3 % (ref 0–0.5)
LYMPHOCYTES # BLD AUTO: 1.55 10*3/MM3 (ref 0.7–3.1)
LYMPHOCYTES NFR BLD AUTO: 27.1 % (ref 19.6–45.3)
MCH RBC QN AUTO: 30.9 PG (ref 26.6–33)
MCHC RBC AUTO-ENTMCNC: 32.8 G/DL (ref 31.5–35.7)
MCV RBC AUTO: 94.2 FL (ref 79–97)
MONOCYTES # BLD AUTO: 0.53 10*3/MM3 (ref 0.1–0.9)
MONOCYTES NFR BLD AUTO: 9.2 % (ref 5–12)
NEUTROPHILS NFR BLD AUTO: 3.36 10*3/MM3 (ref 1.7–7)
NEUTROPHILS NFR BLD AUTO: 58.7 % (ref 42.7–76)
NRBC BLD AUTO-RTO: 0 /100 WBC (ref 0–0.2)
PLATELET # BLD AUTO: 140 10*3/MM3 (ref 140–450)
PMV BLD AUTO: 10 FL (ref 6–12)
POTASSIUM SERPL-SCNC: 5.3 MMOL/L (ref 3.5–5.2)
PROT SERPL-MCNC: 6.5 G/DL (ref 6–8.5)
PSA SERPL-MCNC: 0.08 NG/ML (ref 0–4)
RBC # BLD AUTO: 5.01 10*6/MM3 (ref 4.14–5.8)
SODIUM SERPL-SCNC: 136 MMOL/L (ref 136–145)
T4 FREE SERPL-MCNC: 1.08 NG/DL (ref 0.92–1.68)
TSH SERPL DL<=0.05 MIU/L-ACNC: 4.19 UIU/ML (ref 0.27–4.2)
WBC NRBC COR # BLD AUTO: 5.73 10*3/MM3 (ref 3.4–10.8)

## 2025-04-01 PROCEDURE — 84439 ASSAY OF FREE THYROXINE: CPT

## 2025-04-01 PROCEDURE — 82043 UR ALBUMIN QUANTITATIVE: CPT

## 2025-04-01 PROCEDURE — 85025 COMPLETE CBC W/AUTO DIFF WBC: CPT

## 2025-04-01 PROCEDURE — 80053 COMPREHEN METABOLIC PANEL: CPT

## 2025-04-01 PROCEDURE — 84153 ASSAY OF PSA TOTAL: CPT

## 2025-04-01 PROCEDURE — 36415 COLL VENOUS BLD VENIPUNCTURE: CPT

## 2025-04-01 PROCEDURE — 84443 ASSAY THYROID STIM HORMONE: CPT

## 2025-04-01 PROCEDURE — 83036 HEMOGLOBIN GLYCOSYLATED A1C: CPT

## 2025-04-03 ENCOUNTER — OFFICE VISIT (OUTPATIENT)
Dept: CARDIAC REHAB | Facility: HOSPITAL | Age: 85
End: 2025-04-03

## 2025-04-03 VITALS
DIASTOLIC BLOOD PRESSURE: 60 MMHG | OXYGEN SATURATION: 97 % | WEIGHT: 238.1 LBS | SYSTOLIC BLOOD PRESSURE: 118 MMHG | HEART RATE: 62 BPM | BODY MASS INDEX: 34.16 KG/M2

## 2025-04-03 DIAGNOSIS — Z95.5 HISTORY OF HEART ARTERY STENT: Primary | ICD-10-CM

## 2025-04-03 RX ORDER — HYDROCODONE BITARTRATE AND ACETAMINOPHEN 7.5; 325 MG/1; MG/1
1 TABLET ORAL EVERY 6 HOURS PRN
COMMUNITY
Start: 2024-12-05

## 2025-04-07 ENCOUNTER — OFFICE VISIT (OUTPATIENT)
Dept: INTERNAL MEDICINE | Age: 85
End: 2025-04-07
Payer: MEDICARE

## 2025-04-07 VITALS
HEIGHT: 70 IN | OXYGEN SATURATION: 93 % | TEMPERATURE: 98 F | DIASTOLIC BLOOD PRESSURE: 78 MMHG | WEIGHT: 236 LBS | HEART RATE: 84 BPM | BODY MASS INDEX: 33.79 KG/M2 | SYSTOLIC BLOOD PRESSURE: 129 MMHG

## 2025-04-07 DIAGNOSIS — I48.0 PAROXYSMAL ATRIAL FIBRILLATION: ICD-10-CM

## 2025-04-07 DIAGNOSIS — E55.9 VITAMIN D DEFICIENCY: ICD-10-CM

## 2025-04-07 DIAGNOSIS — N18.32 STAGE 3B CHRONIC KIDNEY DISEASE: Primary | ICD-10-CM

## 2025-04-07 DIAGNOSIS — E78.2 MIXED HYPERLIPIDEMIA: ICD-10-CM

## 2025-04-07 DIAGNOSIS — I10 HYPERTENSION, ESSENTIAL: ICD-10-CM

## 2025-04-07 DIAGNOSIS — E53.8 VITAMIN B 12 DEFICIENCY: ICD-10-CM

## 2025-04-07 DIAGNOSIS — Z86.0100 HISTORY OF COLON POLYPS: ICD-10-CM

## 2025-04-07 DIAGNOSIS — Z85.46 HISTORY OF PROSTATE CANCER: ICD-10-CM

## 2025-04-07 DIAGNOSIS — R73.01 IFG (IMPAIRED FASTING GLUCOSE): ICD-10-CM

## 2025-04-07 DIAGNOSIS — E06.3 HYPOTHYROIDISM DUE TO HASHIMOTO THYROIDITIS: ICD-10-CM

## 2025-04-07 DIAGNOSIS — I25.10 CORONARY ARTERY DISEASE INVOLVING NATIVE CORONARY ARTERY OF NATIVE HEART WITHOUT ANGINA PECTORIS: ICD-10-CM

## 2025-04-07 NOTE — PROGRESS NOTES
"Chief Complaint   Patient presents with    Follow-up     85 yo male presents for 6 month f/u. Pt has had labs. Pt states burning sensation of rt leg/ thigh for one month.    Hyperlipidemia    Coronary Artery Disease    Hypertension        Objective   Vital Signs  Vitals:    04/07/25 0845   BP: 129/78   BP Location: Right arm   Patient Position: Sitting   Pulse: 84   Temp: 98 °F (36.7 °C)   SpO2: 93%   Weight: 107 kg (236 lb)   Height: 177.8 cm (70\")      Body mass index is 33.86 kg/m².  Review of Systems   Constitutional: Negative.    HENT: Negative.     Eyes: Negative.    Respiratory: Negative.     Cardiovascular: Negative.    Gastrointestinal: Negative.    Endocrine: Negative.    Genitourinary: Negative.    Musculoskeletal: Negative.    Allergic/Immunologic: Negative.    Neurological: Negative.    Hematological: Negative.    Psychiatric/Behavioral: Negative.        Physical Exam  Constitutional:       General: He is not in acute distress.     Appearance: Normal appearance.   HENT:      Head: Normocephalic.      Mouth/Throat:      Mouth: Mucous membranes are moist.   Eyes:      Conjunctiva/sclera: Conjunctivae normal.      Pupils: Pupils are equal, round, and reactive to light.   Cardiovascular:      Rate and Rhythm: Normal rate and regular rhythm.      Pulses: Normal pulses.      Heart sounds: Normal heart sounds.   Pulmonary:      Effort: Pulmonary effort is normal.      Breath sounds: Normal breath sounds.   Abdominal:      General: Bowel sounds are normal.      Palpations: Abdomen is soft.   Musculoskeletal:         General: No swelling. Normal range of motion.      Cervical back: Neck supple.   Skin:     General: Skin is warm and dry.      Coloration: Skin is not jaundiced.   Neurological:      General: No focal deficit present.      Mental Status: He is alert and oriented to person, place, and time. Mental status is at baseline.   Psychiatric:         Mood and Affect: Mood normal.         Behavior: Behavior " normal.         Thought Content: Thought content normal.         Judgment: Judgment normal.        Result Review :   Lab Results   Component Value Date    PROBNP 30.7 09/14/2021    PROBNP 17.7 06/23/2021     CMP          9/23/2024    06:39 2/17/2025    09:48 4/1/2025    08:31   CMP   Glucose 100  116  109    BUN 23  26  25    Creatinine 1.61  2.14  1.91    EGFR 41.9  29.8  34.1    Sodium 138  138  136    Potassium 4.9  4.7  5.3    Chloride 104  102  100    Calcium 9.2  9.7  9.7    Total Protein 5.9  7.1  6.5    Albumin 3.9  3.8  4.2    Globulin 2.0  3.3  2.3    Total Bilirubin 0.4  0.5  0.6    Alkaline Phosphatase 68  88  75    AST (SGOT) 18  19  26    ALT (SGPT) 14  21  22    Albumin/Globulin Ratio 2.0  1.2  1.8    BUN/Creatinine Ratio 14.3  12.1  13.1    Anion Gap 7.6  8.2  9.3      CBC w/diff          9/23/2024    06:39 2/17/2025    09:48 4/1/2025    08:31   CBC w/Diff   WBC 4.33  5.17  5.73    RBC 4.59  5.04  5.01    Hemoglobin 13.8  15.7  15.5    Hematocrit 43.3  45.5  47.2    MCV 94.3  90.3  94.2    MCH 30.1  31.2  30.9    MCHC 31.9  34.5  32.8    RDW 13.7  12.8  13.2    Platelets 121  137  140    Neutrophil Rel % 46.7  59.2  58.7    Immature Granulocyte Rel % 0.2  0.2  0.3    Lymphocyte Rel % 33.5  26.5  27.1    Monocyte Rel % 11.1  8.1  9.2    Eosinophil Rel % 7.6  5.2  4.0    Basophil Rel % 0.9  0.8  0.7       Lipid Panel          9/23/2024    06:39   Lipid Panel   Total Cholesterol 113    Triglycerides 152    HDL Cholesterol 31    VLDL Cholesterol 26    LDL Cholesterol  56    LDL/HDL Ratio 1.66       Lab Results   Component Value Date    TSH 4.190 04/01/2025    TSH 2.770 09/23/2024    TSH 2.110 03/20/2024      Lab Results   Component Value Date    FREET4 1.08 04/01/2025    FREET4 1.25 09/23/2024    FREET4 1.32 03/20/2024      A1C Last 3 Results          8/5/2024    06:58 9/23/2024    06:39 4/1/2025    08:31   HGBA1C Last 3 Results   Hemoglobin A1C 6.00  5.70  5.80       PSA          9/23/2024    06:39  4/1/2025    08:31   PSA   PSA 0.084  0.082                     Visit Diagnoses:    ICD-10-CM ICD-9-CM   1. Stage 3b chronic kidney disease  N18.32 585.3   2. Vitamin B 12 deficiency  E53.8 266.2   3. Paroxysmal atrial fibrillation  I48.0 427.31   4. Hypertension, essential  I10 401.9   5. Mixed hyperlipidemia  E78.2 272.2   6. Coronary artery disease involving native coronary artery of native heart without angina pectoris  I25.10 414.01   7. Hypothyroidism due to Hashimoto thyroiditis  E06.3 245.2   8. History of prostate cancer  Z85.46 V10.46   9. Vitamin D deficiency  E55.9 268.9   10. History of colon polyps  Z86.0100 V12.72   11. IFG (impaired fasting glucose)  R73.01 790.21       Assessment and Plan   Diagnoses and all orders for this visit:    1. Stage 3b chronic kidney disease (Primary)  -     Hemoglobin A1c; Future  -     Lipid Panel; Future  -     Comprehensive Metabolic Panel; Future  -     CBC & Differential; Future  -     PSA DIAGNOSTIC; Future  -     TSH+Free T4; Future    2. Vitamin B 12 deficiency  -     Hemoglobin A1c; Future  -     Lipid Panel; Future  -     Comprehensive Metabolic Panel; Future  -     CBC & Differential; Future  -     PSA DIAGNOSTIC; Future  -     TSH+Free T4; Future    3. Paroxysmal atrial fibrillation  -     Hemoglobin A1c; Future  -     Lipid Panel; Future  -     Comprehensive Metabolic Panel; Future  -     CBC & Differential; Future  -     PSA DIAGNOSTIC; Future  -     TSH+Free T4; Future    4. Hypertension, essential  -     Hemoglobin A1c; Future  -     Lipid Panel; Future  -     Comprehensive Metabolic Panel; Future  -     CBC & Differential; Future  -     PSA DIAGNOSTIC; Future  -     TSH+Free T4; Future    5. Mixed hyperlipidemia  -     Hemoglobin A1c; Future  -     Lipid Panel; Future  -     Comprehensive Metabolic Panel; Future  -     CBC & Differential; Future  -     PSA DIAGNOSTIC; Future  -     TSH+Free T4; Future    6. Coronary artery disease involving native coronary  artery of native heart without angina pectoris  -     Hemoglobin A1c; Future  -     Lipid Panel; Future  -     Comprehensive Metabolic Panel; Future  -     CBC & Differential; Future  -     PSA DIAGNOSTIC; Future  -     TSH+Free T4; Future    7. Hypothyroidism due to Hashimoto thyroiditis  -     Hemoglobin A1c; Future  -     Lipid Panel; Future  -     Comprehensive Metabolic Panel; Future  -     CBC & Differential; Future  -     PSA DIAGNOSTIC; Future  -     TSH+Free T4; Future    8. History of prostate cancer  -     Hemoglobin A1c; Future  -     Lipid Panel; Future  -     Comprehensive Metabolic Panel; Future  -     CBC & Differential; Future  -     PSA DIAGNOSTIC; Future  -     TSH+Free T4; Future    9. Vitamin D deficiency  -     Hemoglobin A1c; Future  -     Lipid Panel; Future  -     Comprehensive Metabolic Panel; Future  -     CBC & Differential; Future  -     PSA DIAGNOSTIC; Future  -     TSH+Free T4; Future    10. History of colon polyps  -     Hemoglobin A1c; Future  -     Lipid Panel; Future  -     Comprehensive Metabolic Panel; Future  -     CBC & Differential; Future  -     PSA DIAGNOSTIC; Future  -     TSH+Free T4; Future    11. IFG (impaired fasting glucose)  -     Hemoglobin A1c; Future  -     Lipid Panel; Future  -     Comprehensive Metabolic Panel; Future  -     CBC & Differential; Future  -     PSA DIAGNOSTIC; Future  -     TSH+Free T4; Future        Left nares skin lesion, sore, will follow closely refer to ENT possible,    Cerumen impactions removed bilateral left greater than right October 7, 2024    Medicare annual wellness visit completed October 7, 2024     Colonoscopy ---April 22, 2024 dr pérez,    IFG= hemoglobin A1c 5.8== April 1, 2025, continues Jardiance 10 mg daily by Dr. hernández    CKD stage IIIa,-b -seeing dr hernández since August 2022    Coronary artery disease ---previous stent placement 2007, continues aspirin,    Insomnia, continues  Elavil 75 mg tablet ---half tablet at  bedtime,    Hypertension continues  Norvasc 10 mg daily, irbesartan 300 mg daily    Chronic back pain, [facility]    Lower extremity edema ---continues Lasix 20 mg daily, spironolactone 25 mg, half a tablet daily    Vitamin D and B12 deficiency continues replacement    Hyperlipidemia continues Lipitor 80 mg daily, zetia 10 mg qd , Vascepa 1 g 2 tablets bid    Hypothyroidism continues  levothyroxine 50 mcg daily    Paroxysmal atrial fibrillation, continues  sotalol 40 mg twice a day--dr martinez     Mild thrombocytopenia, chronic,     Prostate Ca==, dr olivarez prostate CA, seed implants, 2012, PSA = 0.08 September 23, 2024, same April 1, 2025    Overweight, discussed weight loss efforts,                     Follow Up   Return in about 6 months (around 10/7/2025).  Patient was given instructions and counseling regarding his condition or for health maintenance advice. Please see specific information pulled into the AVS if appropriate.

## 2025-04-10 ENCOUNTER — OFFICE VISIT (OUTPATIENT)
Dept: CARDIAC REHAB | Facility: HOSPITAL | Age: 85
End: 2025-04-10

## 2025-04-10 VITALS — SYSTOLIC BLOOD PRESSURE: 110 MMHG | OXYGEN SATURATION: 97 % | HEART RATE: 69 BPM | DIASTOLIC BLOOD PRESSURE: 60 MMHG

## 2025-04-10 DIAGNOSIS — Z95.5 HISTORY OF HEART ARTERY STENT: Primary | ICD-10-CM

## 2025-04-17 ENCOUNTER — OFFICE VISIT (OUTPATIENT)
Dept: CARDIAC REHAB | Facility: HOSPITAL | Age: 85
End: 2025-04-17

## 2025-04-17 VITALS
SYSTOLIC BLOOD PRESSURE: 118 MMHG | WEIGHT: 216.05 LBS | DIASTOLIC BLOOD PRESSURE: 62 MMHG | BODY MASS INDEX: 31 KG/M2 | HEART RATE: 76 BPM

## 2025-04-17 DIAGNOSIS — Z95.5 HISTORY OF HEART ARTERY STENT: Primary | ICD-10-CM

## 2025-04-22 ENCOUNTER — OFFICE VISIT (OUTPATIENT)
Dept: CARDIAC REHAB | Facility: HOSPITAL | Age: 85
End: 2025-04-22

## 2025-04-22 VITALS
OXYGEN SATURATION: 96 % | DIASTOLIC BLOOD PRESSURE: 70 MMHG | BODY MASS INDEX: 34.2 KG/M2 | WEIGHT: 238.32 LBS | HEART RATE: 72 BPM | SYSTOLIC BLOOD PRESSURE: 104 MMHG

## 2025-04-22 DIAGNOSIS — Z95.5 HISTORY OF HEART ARTERY STENT: Primary | ICD-10-CM

## 2025-04-24 ENCOUNTER — OFFICE VISIT (OUTPATIENT)
Dept: CARDIAC REHAB | Facility: HOSPITAL | Age: 85
End: 2025-04-24

## 2025-04-24 VITALS
DIASTOLIC BLOOD PRESSURE: 80 MMHG | SYSTOLIC BLOOD PRESSURE: 118 MMHG | HEART RATE: 69 BPM | BODY MASS INDEX: 34.32 KG/M2 | OXYGEN SATURATION: 94 % | WEIGHT: 239.2 LBS

## 2025-04-24 DIAGNOSIS — Z95.5 HISTORY OF HEART ARTERY STENT: Primary | ICD-10-CM

## 2025-04-29 ENCOUNTER — OFFICE VISIT (OUTPATIENT)
Dept: CARDIAC REHAB | Facility: HOSPITAL | Age: 85
End: 2025-04-29

## 2025-04-29 VITALS
WEIGHT: 238.54 LBS | OXYGEN SATURATION: 96 % | HEART RATE: 63 BPM | DIASTOLIC BLOOD PRESSURE: 72 MMHG | SYSTOLIC BLOOD PRESSURE: 112 MMHG | BODY MASS INDEX: 34.23 KG/M2

## 2025-04-29 DIAGNOSIS — Z95.5 HISTORY OF HEART ARTERY STENT: Primary | ICD-10-CM

## 2025-05-01 ENCOUNTER — OFFICE VISIT (OUTPATIENT)
Dept: CARDIAC REHAB | Facility: HOSPITAL | Age: 85
End: 2025-05-01

## 2025-05-01 VITALS — HEART RATE: 84 BPM | DIASTOLIC BLOOD PRESSURE: 70 MMHG | SYSTOLIC BLOOD PRESSURE: 116 MMHG | OXYGEN SATURATION: 94 %

## 2025-05-01 DIAGNOSIS — Z95.5 HISTORY OF HEART ARTERY STENT: Primary | ICD-10-CM

## 2025-05-06 ENCOUNTER — OFFICE VISIT (OUTPATIENT)
Dept: CARDIAC REHAB | Facility: HOSPITAL | Age: 85
End: 2025-05-06

## 2025-05-06 VITALS
WEIGHT: 237.22 LBS | OXYGEN SATURATION: 97 % | HEART RATE: 60 BPM | SYSTOLIC BLOOD PRESSURE: 112 MMHG | BODY MASS INDEX: 34.04 KG/M2 | DIASTOLIC BLOOD PRESSURE: 70 MMHG

## 2025-05-06 DIAGNOSIS — Z95.5 HISTORY OF HEART ARTERY STENT: Primary | ICD-10-CM

## 2025-05-08 ENCOUNTER — OFFICE VISIT (OUTPATIENT)
Dept: CARDIAC REHAB | Facility: HOSPITAL | Age: 85
End: 2025-05-08

## 2025-05-08 VITALS
SYSTOLIC BLOOD PRESSURE: 124 MMHG | WEIGHT: 236.33 LBS | DIASTOLIC BLOOD PRESSURE: 60 MMHG | HEART RATE: 59 BPM | OXYGEN SATURATION: 97 % | BODY MASS INDEX: 33.91 KG/M2

## 2025-05-08 DIAGNOSIS — Z95.5 HISTORY OF HEART ARTERY STENT: Primary | ICD-10-CM

## 2025-05-12 ENCOUNTER — TRANSCRIBE ORDERS (OUTPATIENT)
Dept: LAB | Facility: HOSPITAL | Age: 85
End: 2025-05-12
Payer: MEDICARE

## 2025-05-12 DIAGNOSIS — N18.32 CHRONIC KIDNEY DISEASE (CKD) STAGE G3B/A1, MODERATELY DECREASED GLOMERULAR FILTRATION RATE (GFR) BETWEEN 30-44 ML/MIN/1.73 SQUARE METER AND ALBUMINURIA CREATININE RATIO LESS THAN 30 MG/G (CMS/H*: ICD-10-CM

## 2025-05-12 DIAGNOSIS — E55.9 VITAMIN D DEFICIENCY, UNSPECIFIED: Primary | ICD-10-CM

## 2025-05-13 ENCOUNTER — LAB (OUTPATIENT)
Dept: LAB | Facility: HOSPITAL | Age: 85
End: 2025-05-13
Payer: MEDICARE

## 2025-05-13 ENCOUNTER — OFFICE VISIT (OUTPATIENT)
Dept: CARDIAC REHAB | Facility: HOSPITAL | Age: 85
End: 2025-05-13

## 2025-05-13 VITALS
DIASTOLIC BLOOD PRESSURE: 60 MMHG | HEART RATE: 74 BPM | OXYGEN SATURATION: 98 % | BODY MASS INDEX: 34.51 KG/M2 | WEIGHT: 240.52 LBS | SYSTOLIC BLOOD PRESSURE: 112 MMHG

## 2025-05-13 DIAGNOSIS — Z95.5 HISTORY OF HEART ARTERY STENT: Primary | ICD-10-CM

## 2025-05-13 DIAGNOSIS — N18.32 CHRONIC KIDNEY DISEASE (CKD) STAGE G3B/A1, MODERATELY DECREASED GLOMERULAR FILTRATION RATE (GFR) BETWEEN 30-44 ML/MIN/1.73 SQUARE METER AND ALBUMINURIA CREATININE RATIO LESS THAN 30 MG/G (CMS/H*: ICD-10-CM

## 2025-05-13 DIAGNOSIS — E55.9 VITAMIN D DEFICIENCY, UNSPECIFIED: ICD-10-CM

## 2025-05-13 LAB
25(OH)D3 SERPL-MCNC: 53.9 NG/ML (ref 30–100)
ALBUMIN SERPL-MCNC: 4.4 G/DL (ref 3.5–5.2)
ANION GAP SERPL CALCULATED.3IONS-SCNC: 12.7 MMOL/L (ref 5–15)
BACTERIA UR QL AUTO: NORMAL /HPF
BILIRUB UR QL STRIP: NEGATIVE
BUN SERPL-MCNC: 28 MG/DL (ref 8–23)
BUN/CREAT SERPL: 16.2 (ref 7–25)
CALCIUM SPEC-SCNC: 9.3 MG/DL (ref 8.6–10.5)
CHLORIDE SERPL-SCNC: 103 MMOL/L (ref 98–107)
CLARITY UR: CLEAR
CO2 SERPL-SCNC: 20.3 MMOL/L (ref 22–29)
COLOR UR: YELLOW
CREAT SERPL-MCNC: 1.73 MG/DL (ref 0.76–1.27)
CREAT UR-MCNC: 132.5 MG/DL
DEPRECATED RDW RBC AUTO: 45.5 FL (ref 37–54)
EGFRCR SERPLBLD CKD-EPI 2021: 38.2 ML/MIN/1.73
ERYTHROCYTE [DISTWIDTH] IN BLOOD BY AUTOMATED COUNT: 13.6 % (ref 12.3–15.4)
GLUCOSE SERPL-MCNC: 90 MG/DL (ref 65–99)
GLUCOSE UR STRIP-MCNC: ABNORMAL MG/DL
HCT VFR BLD AUTO: 44.9 % (ref 37.5–51)
HGB BLD-MCNC: 15.8 G/DL (ref 13–17.7)
HGB UR QL STRIP.AUTO: NEGATIVE
HYALINE CASTS UR QL AUTO: NORMAL /LPF
KETONES UR QL STRIP: NEGATIVE
LEUKOCYTE ESTERASE UR QL STRIP.AUTO: ABNORMAL
MCH RBC QN AUTO: 32 PG (ref 26.6–33)
MCHC RBC AUTO-ENTMCNC: 35.2 G/DL (ref 31.5–35.7)
MCV RBC AUTO: 90.9 FL (ref 79–97)
NITRITE UR QL STRIP: NEGATIVE
PH UR STRIP.AUTO: 5.5 [PH] (ref 5–8)
PHOSPHATE SERPL-MCNC: 3.5 MG/DL (ref 2.5–4.5)
PLATELET # BLD AUTO: 114 10*3/MM3 (ref 140–450)
PMV BLD AUTO: 10.4 FL (ref 6–12)
POTASSIUM SERPL-SCNC: 4.7 MMOL/L (ref 3.5–5.2)
PROT ?TM UR-MCNC: 18 MG/DL
PROT UR QL STRIP: ABNORMAL
PROT/CREAT UR: 0.14 MG/G{CREAT}
PTH-INTACT SERPL-MCNC: 74.4 PG/ML (ref 15–65)
RBC # BLD AUTO: 4.94 10*6/MM3 (ref 4.14–5.8)
RBC # UR STRIP: NORMAL /HPF
REF LAB TEST METHOD: NORMAL
SODIUM SERPL-SCNC: 136 MMOL/L (ref 136–145)
SP GR UR STRIP: 1.02 (ref 1–1.03)
SQUAMOUS #/AREA URNS HPF: NORMAL /HPF
UROBILINOGEN UR QL STRIP: ABNORMAL
WBC # UR STRIP: NORMAL /HPF
WBC NRBC COR # BLD AUTO: 5.86 10*3/MM3 (ref 3.4–10.8)

## 2025-05-13 PROCEDURE — 36415 COLL VENOUS BLD VENIPUNCTURE: CPT

## 2025-05-13 PROCEDURE — 81001 URINALYSIS AUTO W/SCOPE: CPT

## 2025-05-13 PROCEDURE — 83970 ASSAY OF PARATHORMONE: CPT

## 2025-05-13 PROCEDURE — 84156 ASSAY OF PROTEIN URINE: CPT

## 2025-05-13 PROCEDURE — 82306 VITAMIN D 25 HYDROXY: CPT

## 2025-05-13 PROCEDURE — 85027 COMPLETE CBC AUTOMATED: CPT

## 2025-05-13 PROCEDURE — 80069 RENAL FUNCTION PANEL: CPT

## 2025-05-13 PROCEDURE — 82570 ASSAY OF URINE CREATININE: CPT

## 2025-05-15 ENCOUNTER — OFFICE VISIT (OUTPATIENT)
Dept: CARDIAC REHAB | Facility: HOSPITAL | Age: 85
End: 2025-05-15

## 2025-05-15 VITALS — HEART RATE: 71 BPM | SYSTOLIC BLOOD PRESSURE: 130 MMHG | OXYGEN SATURATION: 96 % | DIASTOLIC BLOOD PRESSURE: 80 MMHG

## 2025-05-15 DIAGNOSIS — Z95.5 HISTORY OF HEART ARTERY STENT: Primary | ICD-10-CM

## 2025-05-20 ENCOUNTER — OFFICE VISIT (OUTPATIENT)
Dept: CARDIAC REHAB | Facility: HOSPITAL | Age: 85
End: 2025-05-20

## 2025-05-20 VITALS
OXYGEN SATURATION: 94 % | WEIGHT: 237.88 LBS | SYSTOLIC BLOOD PRESSURE: 118 MMHG | BODY MASS INDEX: 34.13 KG/M2 | HEART RATE: 66 BPM | DIASTOLIC BLOOD PRESSURE: 78 MMHG

## 2025-05-20 DIAGNOSIS — Z95.5 HISTORY OF HEART ARTERY STENT: Primary | ICD-10-CM

## 2025-05-22 ENCOUNTER — OFFICE VISIT (OUTPATIENT)
Dept: CARDIAC REHAB | Facility: HOSPITAL | Age: 85
End: 2025-05-22

## 2025-05-22 VITALS
BODY MASS INDEX: 34.04 KG/M2 | WEIGHT: 237.22 LBS | DIASTOLIC BLOOD PRESSURE: 64 MMHG | HEART RATE: 72 BPM | OXYGEN SATURATION: 97 % | SYSTOLIC BLOOD PRESSURE: 120 MMHG

## 2025-05-22 DIAGNOSIS — Z95.5 HISTORY OF HEART ARTERY STENT: Primary | ICD-10-CM

## 2025-05-29 ENCOUNTER — OFFICE VISIT (OUTPATIENT)
Dept: CARDIAC REHAB | Facility: HOSPITAL | Age: 85
End: 2025-05-29

## 2025-05-29 VITALS
DIASTOLIC BLOOD PRESSURE: 64 MMHG | OXYGEN SATURATION: 97 % | WEIGHT: 235.23 LBS | BODY MASS INDEX: 33.75 KG/M2 | SYSTOLIC BLOOD PRESSURE: 112 MMHG | HEART RATE: 70 BPM

## 2025-05-29 DIAGNOSIS — Z95.5 HISTORY OF HEART ARTERY STENT: Primary | ICD-10-CM

## 2025-06-03 ENCOUNTER — OFFICE VISIT (OUTPATIENT)
Dept: CARDIAC REHAB | Facility: HOSPITAL | Age: 85
End: 2025-06-03

## 2025-06-03 VITALS
WEIGHT: 234.79 LBS | OXYGEN SATURATION: 98 % | HEART RATE: 62 BPM | DIASTOLIC BLOOD PRESSURE: 70 MMHG | SYSTOLIC BLOOD PRESSURE: 110 MMHG | BODY MASS INDEX: 33.69 KG/M2

## 2025-06-03 DIAGNOSIS — Z95.5 HISTORY OF HEART ARTERY STENT: Primary | ICD-10-CM

## 2025-06-05 ENCOUNTER — OFFICE VISIT (OUTPATIENT)
Dept: CARDIAC REHAB | Facility: HOSPITAL | Age: 85
End: 2025-06-05

## 2025-06-05 VITALS
BODY MASS INDEX: 34.07 KG/M2 | DIASTOLIC BLOOD PRESSURE: 64 MMHG | WEIGHT: 237.44 LBS | OXYGEN SATURATION: 95 % | HEART RATE: 69 BPM | SYSTOLIC BLOOD PRESSURE: 116 MMHG

## 2025-06-05 DIAGNOSIS — Z95.5 HISTORY OF HEART ARTERY STENT: Primary | ICD-10-CM

## 2025-06-10 ENCOUNTER — OFFICE VISIT (OUTPATIENT)
Dept: CARDIAC REHAB | Facility: HOSPITAL | Age: 85
End: 2025-06-10

## 2025-06-10 VITALS
WEIGHT: 236.11 LBS | HEART RATE: 57 BPM | DIASTOLIC BLOOD PRESSURE: 62 MMHG | SYSTOLIC BLOOD PRESSURE: 114 MMHG | OXYGEN SATURATION: 96 % | BODY MASS INDEX: 33.88 KG/M2

## 2025-06-10 DIAGNOSIS — Z95.5 HISTORY OF HEART ARTERY STENT: Primary | ICD-10-CM

## 2025-06-17 ENCOUNTER — OFFICE VISIT (OUTPATIENT)
Dept: CARDIAC REHAB | Facility: HOSPITAL | Age: 85
End: 2025-06-17

## 2025-06-17 VITALS
WEIGHT: 236.33 LBS | BODY MASS INDEX: 33.91 KG/M2 | SYSTOLIC BLOOD PRESSURE: 108 MMHG | DIASTOLIC BLOOD PRESSURE: 60 MMHG | OXYGEN SATURATION: 96 % | HEART RATE: 68 BPM

## 2025-06-17 DIAGNOSIS — Z95.5 HISTORY OF HEART ARTERY STENT: Primary | ICD-10-CM

## 2025-06-19 ENCOUNTER — OFFICE VISIT (OUTPATIENT)
Dept: CARDIAC REHAB | Facility: HOSPITAL | Age: 85
End: 2025-06-19

## 2025-06-19 VITALS — SYSTOLIC BLOOD PRESSURE: 112 MMHG | OXYGEN SATURATION: 95 % | HEART RATE: 85 BPM | DIASTOLIC BLOOD PRESSURE: 70 MMHG

## 2025-06-19 DIAGNOSIS — Z95.5 HISTORY OF HEART ARTERY STENT: Primary | ICD-10-CM

## 2025-06-24 ENCOUNTER — OFFICE VISIT (OUTPATIENT)
Dept: CARDIAC REHAB | Facility: HOSPITAL | Age: 85
End: 2025-06-24

## 2025-06-24 VITALS
HEART RATE: 70 BPM | BODY MASS INDEX: 33.85 KG/M2 | WEIGHT: 235.89 LBS | OXYGEN SATURATION: 96 % | SYSTOLIC BLOOD PRESSURE: 104 MMHG | DIASTOLIC BLOOD PRESSURE: 60 MMHG

## 2025-06-24 DIAGNOSIS — Z95.5 HISTORY OF HEART ARTERY STENT: Primary | ICD-10-CM

## 2025-06-26 ENCOUNTER — APPOINTMENT (OUTPATIENT)
Dept: CARDIAC REHAB | Facility: HOSPITAL | Age: 85
End: 2025-06-26

## 2025-07-01 ENCOUNTER — APPOINTMENT (OUTPATIENT)
Dept: CARDIAC REHAB | Facility: HOSPITAL | Age: 85
End: 2025-07-01

## 2025-07-03 ENCOUNTER — OFFICE VISIT (OUTPATIENT)
Dept: CARDIOLOGY | Facility: CLINIC | Age: 85
End: 2025-07-03
Payer: MEDICARE

## 2025-07-03 ENCOUNTER — OFFICE VISIT (OUTPATIENT)
Dept: CARDIAC REHAB | Facility: HOSPITAL | Age: 85
End: 2025-07-03

## 2025-07-03 VITALS
HEART RATE: 50 BPM | BODY MASS INDEX: 34.07 KG/M2 | DIASTOLIC BLOOD PRESSURE: 77 MMHG | HEIGHT: 70 IN | SYSTOLIC BLOOD PRESSURE: 126 MMHG | WEIGHT: 238 LBS

## 2025-07-03 VITALS
WEIGHT: 235.89 LBS | HEART RATE: 62 BPM | BODY MASS INDEX: 33.85 KG/M2 | SYSTOLIC BLOOD PRESSURE: 126 MMHG | OXYGEN SATURATION: 96 % | DIASTOLIC BLOOD PRESSURE: 78 MMHG

## 2025-07-03 DIAGNOSIS — Z95.5 HISTORY OF HEART ARTERY STENT: Primary | ICD-10-CM

## 2025-07-03 DIAGNOSIS — I48.0 PAROXYSMAL ATRIAL FIBRILLATION: Primary | ICD-10-CM

## 2025-07-03 DIAGNOSIS — I25.10 CORONARY ARTERY DISEASE INVOLVING NATIVE CORONARY ARTERY OF NATIVE HEART WITHOUT ANGINA PECTORIS: ICD-10-CM

## 2025-07-03 DIAGNOSIS — E78.2 MIXED HYPERLIPIDEMIA: ICD-10-CM

## 2025-07-03 DIAGNOSIS — I10 HYPERTENSION, ESSENTIAL: ICD-10-CM

## 2025-07-03 PROCEDURE — 3078F DIAST BP <80 MM HG: CPT | Performed by: INTERNAL MEDICINE

## 2025-07-03 PROCEDURE — 1160F RVW MEDS BY RX/DR IN RCRD: CPT | Performed by: INTERNAL MEDICINE

## 2025-07-03 PROCEDURE — 3074F SYST BP LT 130 MM HG: CPT | Performed by: INTERNAL MEDICINE

## 2025-07-03 PROCEDURE — 93000 ELECTROCARDIOGRAM COMPLETE: CPT | Performed by: INTERNAL MEDICINE

## 2025-07-03 PROCEDURE — 1159F MED LIST DOCD IN RCRD: CPT | Performed by: INTERNAL MEDICINE

## 2025-07-03 PROCEDURE — 99214 OFFICE O/P EST MOD 30 MIN: CPT | Performed by: INTERNAL MEDICINE

## 2025-07-03 RX ORDER — ATORVASTATIN CALCIUM 40 MG/1
40 TABLET, FILM COATED ORAL DAILY
COMMUNITY

## 2025-07-03 NOTE — PROGRESS NOTES
CARDIOLOGY FOLLOW UP PROGRESS NOTE      Chief Complaint  Follow-up, Coronary Artery Disease, Atrial Fibrillation, and Hyperlipidemia    Subjective            Michael Sol presents to [facility] CARDIOLOGY  History of Present Illness    Mr Sol is here for a routine follow-up visit.  He denies any complaints.  No recent episodes of chest pain, shortness of breath, palpitations or dizziness.  Since last office visit, [facility].      Past History:    Coronary artery disease : Status post angioplasty to LAD artery in 2007.  Paroxysmal atrial fibrillation : On sotalol for many years,  Essential hypertension  Mixed hyperlipidemia  Chronic kidney disease    Medical History:  Past Medical History:   Diagnosis Date    Atrial fibrillation     Benign prostatic hyperplasia without lower urinary tract symptoms     Chronic kidney disease, stage 3     Colon polyp     Elevated cholesterol     Essential (primary) hypertension     Hyperlipidemia, unspecified     Hypothyroidism, unspecified     Myocardial infarction     Sleep apnea     Vitamin D deficiency, unspecified        Social History: reports that he quit smoking about 54 years ago. His smoking use included cigarettes. He started smoking about 74 years ago. He has a 20 pack-year smoking history. He has been exposed to tobacco smoke. He has never used smokeless tobacco. He reports current alcohol use. He reports that he does not use drugs.    Allergies: Lisinopril    Current Outpatient Medications on File Prior to Visit   Medication Sig    amitriptyline (ELAVIL) 75 MG tablet Take 0.5 tablets by mouth Every Night.    amLODIPine (NORVASC) 10 MG tablet Take 1 tablet by mouth Daily.    apixaban (ELIQUIS) 2.5 MG tablet tablet Take 1 tablet by mouth 2 (Two) Times a Day.    atorvastatin (LIPITOR) 40 MG tablet Take 1 tablet by mouth Daily.    cholecalciferol (VITAMIN D3) 25 MCG (1000 UT) tablet Take 1 tablet  "by mouth Daily.    cyanocobalamin 1000 MCG/ML injection Inject 1 mL into the appropriate muscle as directed by prescriber Every 28 (Twenty-Eight) Days.    empagliflozin (JARDIANCE) 10 MG tablet tablet Take 1 tablet by mouth Daily.    ezetimibe (ZETIA) 10 MG tablet Take 1 tablet by mouth Daily.    furosemide (LASIX) 20 MG tablet Take 1 tablet by mouth Daily. 1 tab six days a week.    HYDROcodone-acetaminophen (NORCO) 7.5-325 MG per tablet Take 1 tablet by mouth Every 6 (Six) Hours As Needed.    irbesartan (AVAPRO) 300 MG tablet TAKE 1 TABLET BY MOUTH EVERY NIGHT    levothyroxine (SYNTHROID, LEVOTHROID) 50 MCG tablet Take 1 tablet by mouth Daily.    sotalol (BETAPACE) 80 MG tablet Take 0.5 tablets by mouth 2 (Two) Times a Day.    Vascepa 1 g capsule capsule Take 2 g by mouth Daily.    spironolactone (ALDACTONE) 25 MG tablet Take 1 tablet by mouth.     No current facility-administered medications on file prior to visit.          Review of Systems   Respiratory:  Negative for cough, shortness of breath and wheezing.    Cardiovascular:  Negative for chest pain, palpitations and leg swelling.   Gastrointestinal:  Negative for nausea and vomiting.   Neurological:  Negative for dizziness and syncope.        Objective     /77   Pulse 50   Ht 177.8 cm (70\")   Wt 108 kg (238 lb)   BMI 34.15 kg/m²       Physical Exam  Constitutional:       General: He is awake. He is not in acute distress.     Appearance: Normal appearance.   Eyes:      Extraocular Movements: Extraocular movements intact.      Pupils: Pupils are equal, round, and reactive to light.   Neck:      Thyroid: No thyromegaly.      Vascular: No carotid bruit or JVD.   Cardiovascular:      Rate and Rhythm: Normal rate and regular rhythm.      Chest Wall: PMI is not displaced.      Heart sounds: Normal heart sounds, S1 normal and S2 normal. No murmur heard.     No friction rub. No gallop. No S3 or S4 sounds.   Pulmonary:      Effort: Pulmonary effort is normal. " No respiratory distress.      Breath sounds: Normal breath sounds. No wheezing, rhonchi or rales.   Abdominal:      General: Bowel sounds are normal.      Palpations: Abdomen is soft.      Tenderness: There is no abdominal tenderness.   Musculoskeletal:      Cervical back: Neck supple.      Right lower leg: No edema.      Left lower leg: No edema.   Skin:     Nails: There is no clubbing.   Neurological:      General: No focal deficit present.      Mental Status: He is alert and oriented to person, place, and time.           Result Review :     The following data was reviewed by: Elliott Calero MD on 07/03/2025:    CMP          2/17/2025    09:48 4/1/2025    08:31 5/13/2025    16:00   CMP   Glucose 116  109  90    BUN 26  25  28    Creatinine 2.14  1.91  1.73    EGFR 29.8  34.1  38.2    Sodium 138  136  136    Potassium 4.7  5.3  4.7    Chloride 102  100  103    Calcium 9.7  9.7  9.3    Total Protein 7.1  6.5     Albumin 3.8  4.2  4.4    Globulin 3.3  2.3     Total Bilirubin 0.5  0.6     Alkaline Phosphatase 88  75     AST (SGOT) 19  26     ALT (SGPT) 21  22     Albumin/Globulin Ratio 1.2  1.8     BUN/Creatinine Ratio 12.1  13.1  16.2    Anion Gap 8.2  9.3  12.7      CBC          2/17/2025    09:48 4/1/2025    08:31 5/13/2025    16:00   CBC   WBC 5.17  5.73  5.86    RBC 5.04  5.01  4.94    Hemoglobin 15.7  15.5  15.8    Hematocrit 45.5  47.2  44.9    MCV 90.3  94.2  90.9    MCH 31.2  30.9  32.0    MCHC 34.5  32.8  35.2    RDW 12.8  13.2  13.6    Platelets 137  140  114      TSH          9/23/2024    06:39 4/1/2025    08:31   TSH   TSH 2.770  4.190      Lipid Panel          9/23/2024    06:39   Lipid Panel   Total Cholesterol 113    Triglycerides 152    HDL Cholesterol 31    VLDL Cholesterol 26    LDL Cholesterol  56    LDL/HDL Ratio 1.66         Data reviewed: Cardiology studies        ECG 12 Lead    Date/Time: 7/3/2025 4:13 PM  Performed by: Elliott Calero MD    Authorized by: Elliott Calero MD   Comparison: compared with previous ECG from 2/26/2024  Similar to previous ECG  Rhythm: sinus rhythm  Rate: normal  Conduction: incomplete right bundle branch block and left anterior fascicular block  ST Segments: ST segments normal  T Waves: T waves normal  QRS axis: left  Other: no other findings  Other findings comments: Corrected QT interval is 435 ms    Clinical impression: abnormal EKG              Assessment and Plan        Diagnoses and all orders for this visit:    1. Paroxysmal atrial fibrillation (Primary)  Assessment & Plan:  He is in sinus rhythm on today's EKG.  QT interval is acceptable.  No recent episodes of A-fib.  Continue sotalol for rate and rhythm management.  Continue Eliquis for anticoagulation.    Orders:  -     ECG 12 Lead    2. Coronary artery disease involving native coronary artery of native heart without angina pectoris  Assessment & Plan:  He is chest pain-free.  Will continue beta-blocker, statins.      3. Hypertension, essential  Assessment & Plan:  Blood pressure well-controlled.  Continue amlodipine, irbesartan and spironolactone.  Creatinine at baseline per most recent labs.  Potassium levels are within normal limits.      4. Mixed hyperlipidemia  Assessment & Plan:  LDL in 50s per most recent available labs.  Continue atorvastatin and Zetia.  [facility]            Follow Up     Return in about 9 months (around 4/3/2026) for Next scheduled follow up.    Patient was given instructions and counseling regarding his condition or for health maintenance advice. Please see specific information pulled into the AVS if appropriate.

## 2025-07-06 NOTE — ASSESSMENT & PLAN NOTE
Blood pressure well-controlled.  Continue amlodipine, irbesartan and spironolactone.  Creatinine at baseline per most recent labs.  Potassium levels are within normal limits.

## 2025-07-06 NOTE — ASSESSMENT & PLAN NOTE
He is in sinus rhythm on today's EKG.  QT interval is acceptable.  No recent episodes of A-fib.  Continue sotalol for rate and rhythm management.  Continue Eliquis for anticoagulation.

## 2025-07-06 NOTE — ASSESSMENT & PLAN NOTE
LDL in 50s per most recent available labs.  Continue atorvastatin and Zetia.  [facility]

## 2025-07-08 ENCOUNTER — OFFICE VISIT (OUTPATIENT)
Dept: CARDIAC REHAB | Facility: HOSPITAL | Age: 85
End: 2025-07-08

## 2025-07-08 VITALS
DIASTOLIC BLOOD PRESSURE: 60 MMHG | SYSTOLIC BLOOD PRESSURE: 118 MMHG | HEART RATE: 62 BPM | OXYGEN SATURATION: 94 % | WEIGHT: 238.1 LBS | BODY MASS INDEX: 34.16 KG/M2

## 2025-07-08 DIAGNOSIS — Z95.5 HISTORY OF HEART ARTERY STENT: Primary | ICD-10-CM

## 2025-07-10 ENCOUNTER — OFFICE VISIT (OUTPATIENT)
Dept: CARDIAC REHAB | Facility: HOSPITAL | Age: 85
End: 2025-07-10

## 2025-07-10 VITALS
WEIGHT: 236.77 LBS | BODY MASS INDEX: 33.97 KG/M2 | DIASTOLIC BLOOD PRESSURE: 68 MMHG | HEART RATE: 66 BPM | SYSTOLIC BLOOD PRESSURE: 118 MMHG | OXYGEN SATURATION: 94 %

## 2025-07-10 DIAGNOSIS — Z95.5 HISTORY OF HEART ARTERY STENT: Primary | ICD-10-CM

## 2025-07-15 ENCOUNTER — OFFICE VISIT (OUTPATIENT)
Dept: CARDIAC REHAB | Facility: HOSPITAL | Age: 85
End: 2025-07-15

## 2025-07-15 VITALS
WEIGHT: 236.55 LBS | BODY MASS INDEX: 33.94 KG/M2 | OXYGEN SATURATION: 95 % | SYSTOLIC BLOOD PRESSURE: 100 MMHG | HEART RATE: 67 BPM | DIASTOLIC BLOOD PRESSURE: 60 MMHG

## 2025-07-15 DIAGNOSIS — Z95.5 HISTORY OF HEART ARTERY STENT: Primary | ICD-10-CM

## 2025-07-17 ENCOUNTER — OFFICE VISIT (OUTPATIENT)
Dept: CARDIAC REHAB | Facility: HOSPITAL | Age: 85
End: 2025-07-17

## 2025-07-17 DIAGNOSIS — Z95.5 HISTORY OF HEART ARTERY STENT: Primary | ICD-10-CM

## 2025-07-24 ENCOUNTER — OFFICE VISIT (OUTPATIENT)
Dept: CARDIAC REHAB | Facility: HOSPITAL | Age: 85
End: 2025-07-24

## 2025-07-24 DIAGNOSIS — Z95.5 HISTORY OF HEART ARTERY STENT: Primary | ICD-10-CM

## 2025-07-29 ENCOUNTER — OFFICE VISIT (OUTPATIENT)
Dept: CARDIAC REHAB | Facility: HOSPITAL | Age: 85
End: 2025-07-29

## 2025-07-29 DIAGNOSIS — Z95.5 HISTORY OF HEART ARTERY STENT: Primary | ICD-10-CM

## 2025-07-31 ENCOUNTER — OFFICE VISIT (OUTPATIENT)
Dept: CARDIAC REHAB | Facility: HOSPITAL | Age: 85
End: 2025-07-31

## 2025-07-31 VITALS — SYSTOLIC BLOOD PRESSURE: 118 MMHG | DIASTOLIC BLOOD PRESSURE: 64 MMHG | HEART RATE: 66 BPM | OXYGEN SATURATION: 96 %

## 2025-07-31 DIAGNOSIS — Z95.5 HISTORY OF HEART ARTERY STENT: Primary | ICD-10-CM

## 2025-08-05 ENCOUNTER — OFFICE VISIT (OUTPATIENT)
Dept: CARDIAC REHAB | Facility: HOSPITAL | Age: 85
End: 2025-08-05

## 2025-08-05 VITALS
OXYGEN SATURATION: 96 % | WEIGHT: 234.35 LBS | DIASTOLIC BLOOD PRESSURE: 60 MMHG | HEART RATE: 67 BPM | BODY MASS INDEX: 33.63 KG/M2 | SYSTOLIC BLOOD PRESSURE: 110 MMHG

## 2025-08-05 DIAGNOSIS — Z95.5 HISTORY OF HEART ARTERY STENT: Primary | ICD-10-CM

## 2025-08-07 ENCOUNTER — OFFICE VISIT (OUTPATIENT)
Dept: CARDIAC REHAB | Facility: HOSPITAL | Age: 85
End: 2025-08-07

## 2025-08-07 DIAGNOSIS — Z95.5 HISTORY OF HEART ARTERY STENT: Primary | ICD-10-CM

## 2025-08-14 ENCOUNTER — OFFICE VISIT (OUTPATIENT)
Dept: CARDIAC REHAB | Facility: HOSPITAL | Age: 85
End: 2025-08-14

## 2025-08-14 VITALS
BODY MASS INDEX: 34.1 KG/M2 | OXYGEN SATURATION: 96 % | DIASTOLIC BLOOD PRESSURE: 86 MMHG | HEART RATE: 90 BPM | WEIGHT: 237.66 LBS | SYSTOLIC BLOOD PRESSURE: 116 MMHG

## 2025-08-14 DIAGNOSIS — Z95.5 HISTORY OF HEART ARTERY STENT: Primary | ICD-10-CM

## 2025-08-19 ENCOUNTER — OFFICE VISIT (OUTPATIENT)
Dept: CARDIAC REHAB | Facility: HOSPITAL | Age: 85
End: 2025-08-19

## 2025-08-19 VITALS
WEIGHT: 232.59 LBS | DIASTOLIC BLOOD PRESSURE: 58 MMHG | SYSTOLIC BLOOD PRESSURE: 92 MMHG | OXYGEN SATURATION: 94 % | HEART RATE: 67 BPM | BODY MASS INDEX: 33.37 KG/M2

## 2025-08-19 DIAGNOSIS — Z95.5 HISTORY OF HEART ARTERY STENT: Primary | ICD-10-CM

## 2025-08-21 ENCOUNTER — OFFICE VISIT (OUTPATIENT)
Dept: CARDIAC REHAB | Facility: HOSPITAL | Age: 85
End: 2025-08-21

## 2025-08-21 VITALS
DIASTOLIC BLOOD PRESSURE: 60 MMHG | BODY MASS INDEX: 33.75 KG/M2 | OXYGEN SATURATION: 96 % | SYSTOLIC BLOOD PRESSURE: 114 MMHG | HEART RATE: 63 BPM | WEIGHT: 235.23 LBS

## 2025-08-21 DIAGNOSIS — Z95.5 HISTORY OF HEART ARTERY STENT: Primary | ICD-10-CM

## 2025-08-28 ENCOUNTER — OFFICE VISIT (OUTPATIENT)
Dept: CARDIAC REHAB | Facility: HOSPITAL | Age: 85
End: 2025-08-28

## 2025-08-28 VITALS
DIASTOLIC BLOOD PRESSURE: 60 MMHG | HEART RATE: 65 BPM | SYSTOLIC BLOOD PRESSURE: 110 MMHG | WEIGHT: 233.47 LBS | BODY MASS INDEX: 33.5 KG/M2 | OXYGEN SATURATION: 96 %

## 2025-08-28 DIAGNOSIS — Z95.5 HISTORY OF HEART ARTERY STENT: Primary | ICD-10-CM

## (undated) DEVICE — LINER SURG CANSTR SXN S/RIGD 1500CC

## (undated) DEVICE — THE SINGLE USE ETRAP – POLYP TRAP IS USED FOR SUCTION RETRIEVAL OF ENDOSCOPICALLY REMOVED POLYPS.: Brand: ETRAP

## (undated) DEVICE — SOLIDIFIER LIQLOC PLS 1500CC BT

## (undated) DEVICE — Device: Brand: DEFENDO AIR/WATER/SUCTION AND BIOPSY VALVE

## (undated) DEVICE — Device

## (undated) DEVICE — SNAR POLYP CAPTIFLEX XS/OVL 11X2.4MM 240CM 1P/U

## (undated) DEVICE — CONN JET HYDRA H20 AUXILIARY DISP

## (undated) DEVICE — SOL IRRG H2O PL/BG 1000ML STRL